# Patient Record
Sex: FEMALE | Race: BLACK OR AFRICAN AMERICAN | NOT HISPANIC OR LATINO | Employment: FULL TIME | URBAN - METROPOLITAN AREA
[De-identification: names, ages, dates, MRNs, and addresses within clinical notes are randomized per-mention and may not be internally consistent; named-entity substitution may affect disease eponyms.]

---

## 2017-01-06 ENCOUNTER — HOSPITAL ENCOUNTER (OUTPATIENT)
Dept: RADIOLOGY | Facility: OTHER | Age: 32
Discharge: HOME | End: 2017-01-06
Attending: INTERNAL MEDICINE
Payer: COMMERCIAL

## 2017-01-06 DIAGNOSIS — R10.33 ABDOMINAL PAIN, PERIUMBILIC: ICD-10-CM

## 2017-01-06 PROCEDURE — 74177 CT ABD & PELVIS W/CONTRAST: CPT | Mod: TC

## 2017-01-06 PROCEDURE — 74177 CT ABD & PELVIS W/CONTRAST: CPT | Mod: 26,,, | Performed by: RADIOLOGY

## 2017-01-06 PROCEDURE — 25500020 PHARM REV CODE 255: Performed by: INTERNAL MEDICINE

## 2017-01-06 RX ADMIN — IOHEXOL 25 ML: 350 INJECTION, SOLUTION INTRAVENOUS at 12:01

## 2017-01-06 RX ADMIN — IOHEXOL 75 ML: 350 INJECTION, SOLUTION INTRAVENOUS at 12:01

## 2017-03-08 ENCOUNTER — HOSPITAL ENCOUNTER (EMERGENCY)
Facility: OTHER | Age: 32
Discharge: HOME | End: 2017-03-09
Attending: EMERGENCY MEDICINE
Payer: COMMERCIAL

## 2017-03-08 DIAGNOSIS — S29.9XXA CHEST WALL INJURY, INITIAL ENCOUNTER: Primary | ICD-10-CM

## 2017-03-08 DIAGNOSIS — T14.90XA TRAUMA: ICD-10-CM
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
## 2017-03-08 LAB
B-HCG UR QL: NEGATIVE
CTP QC/QA: YES

## 2017-03-08 PROCEDURE — 99284 EMERGENCY DEPT VISIT MOD MDM: CPT | Mod: 25

## 2017-03-08 PROCEDURE — 81025 URINE PREGNANCY TEST: CPT | Performed by: EMERGENCY MEDICINE

## 2017-03-08 PROCEDURE — 93005 ELECTROCARDIOGRAM TRACING: CPT

## 2017-03-08 RX ORDER — IBUPROFEN 400 MG/1
800 TABLET ORAL
Status: COMPLETED | OUTPATIENT
Start: 2017-03-08 | End: 2017-03-09

## 2017-03-08 RX ORDER — METHOCARBAMOL 500 MG/1
1000 TABLET, FILM COATED ORAL
Status: COMPLETED | OUTPATIENT
Start: 2017-03-08 | End: 2017-03-09
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
## 2017-03-09 VITALS
HEART RATE: 90 BPM | RESPIRATION RATE: 16 BRPM | OXYGEN SATURATION: 100 % | BODY MASS INDEX: 25.71 KG/M2 | SYSTOLIC BLOOD PRESSURE: 114 MMHG | WEIGHT: 160 LBS | HEIGHT: 66 IN | DIASTOLIC BLOOD PRESSURE: 67 MMHG | TEMPERATURE: 98 F

## 2017-03-09 PROCEDURE — 25000003 PHARM REV CODE 250: Performed by: EMERGENCY MEDICINE

## 2017-03-09 PROCEDURE — 93010 ELECTROCARDIOGRAM REPORT: CPT | Mod: ,,, | Performed by: INTERNAL MEDICINE

## 2017-03-09 RX ORDER — METHOCARBAMOL 500 MG/1
1000 TABLET, FILM COATED ORAL 3 TIMES DAILY PRN
Qty: 30 TABLET | Refills: 0 | Status: SHIPPED | OUTPATIENT
Start: 2017-03-09 | End: 2017-03-14

## 2017-03-09 RX ORDER — IBUPROFEN 600 MG/1
600 TABLET ORAL EVERY 6 HOURS PRN
Qty: 20 TABLET | Refills: 0 | Status: SHIPPED | OUTPATIENT
Start: 2017-03-09 | End: 2017-06-14

## 2017-03-09 RX ADMIN — IBUPROFEN 800 MG: 400 TABLET, FILM COATED ORAL at 12:03

## 2017-03-09 RX ADMIN — METHOCARBAMOL 1000 MG: 500 TABLET ORAL at 12:03
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
## 2017-03-14 ENCOUNTER — OFFICE VISIT (OUTPATIENT)
Dept: OBSTETRICS AND GYNECOLOGY | Facility: CLINIC | Age: 32
End: 2017-03-14
Attending: OBSTETRICS & GYNECOLOGY
Payer: COMMERCIAL

## 2017-03-14 VITALS
SYSTOLIC BLOOD PRESSURE: 108 MMHG | BODY MASS INDEX: 28.86 KG/M2 | DIASTOLIC BLOOD PRESSURE: 70 MMHG | WEIGHT: 179.56 LBS | HEIGHT: 66 IN

## 2017-03-14 DIAGNOSIS — N89.8 VAGINAL IRRITATION: ICD-10-CM

## 2017-03-14 DIAGNOSIS — Z01.419 ENCOUNTER FOR GYNECOLOGICAL EXAMINATION: Primary | ICD-10-CM

## 2017-03-14 DIAGNOSIS — N89.8 VAGINAL DISCHARGE: ICD-10-CM

## 2017-03-14 DIAGNOSIS — Z11.51 SCREENING FOR HUMAN PAPILLOMAVIRUS: ICD-10-CM

## 2017-03-14 DIAGNOSIS — Z20.2 POSSIBLE EXPOSURE TO STD: ICD-10-CM

## 2017-03-14 LAB
CANDIDA RRNA VAG QL PROBE: NEGATIVE
G VAGINALIS RRNA GENITAL QL PROBE: NEGATIVE
T VAGINALIS RRNA GENITAL QL PROBE: NEGATIVE

## 2017-03-14 PROCEDURE — 88175 CYTOPATH C/V AUTO FLUID REDO: CPT

## 2017-03-14 PROCEDURE — 99999 PR PBB SHADOW E&M-EST. PATIENT-LVL III: CPT | Mod: PBBFAC,,, | Performed by: OBSTETRICS & GYNECOLOGY

## 2017-03-14 PROCEDURE — 87624 HPV HI-RISK TYP POOLED RSLT: CPT

## 2017-03-14 PROCEDURE — 99395 PREV VISIT EST AGE 18-39: CPT | Mod: S$GLB,,, | Performed by: OBSTETRICS & GYNECOLOGY

## 2017-03-14 PROCEDURE — 87480 CANDIDA DNA DIR PROBE: CPT

## 2017-03-14 PROCEDURE — 87591 N.GONORRHOEAE DNA AMP PROB: CPT

## 2017-03-14 RX ORDER — CLOTRIMAZOLE AND BETAMETHASONE DIPROPIONATE 10; .64 MG/G; MG/G
CREAM TOPICAL 2 TIMES DAILY
Qty: 15 G | Refills: 3 | Status: SHIPPED | OUTPATIENT
Start: 2017-03-14 | End: 2017-03-21

## 2017-03-14 RX ORDER — FLUCONAZOLE 150 MG/1
150 TABLET ORAL ONCE
Qty: 1 TABLET | Refills: 1 | Status: SHIPPED | OUTPATIENT
Start: 2017-03-14 | End: 2017-03-14

## 2017-03-14 NOTE — PROGRESS NOTES
"CC: Well woman exam    Syed Lyon is a 31 y.o. female  presents for a well woman exam.  She is established.  LMP: Patient's last menstrual period was 2017 (exact date)..   Patient without complaints.  Doing well.  Birth control has been bilateral salpingectomy.     Health Maintenance   Topic Date Due    TETANUS VACCINE  2004    Influenza Vaccine  2016    Pap Smear  2017    Lipid Panel  Completed         Past Medical History:   Diagnosis Date    HPV (human papilloma virus) infection     PID (pelvic inflammatory disease)        Past Surgical History:   Procedure Laterality Date     SECTION      fallopian tube removal Bilateral     HYSTEROSCOPY      2015    In Vitro      Dr Anuja Santana       OB History    Para Term  AB SAB TAB Ectopic Multiple Living   1 1  1     0 1      # Outcome Date GA Lbr Dustin/2nd Weight Sex Delivery Anes PTL Lv   1  16 35w5d  2.608 kg (5 lb 12 oz) F CS-LTranv Spinal N Y          Family History   Problem Relation Age of Onset    Heart disease Father     No Known Problems Daughter     Breast cancer Neg Hx     Colon cancer Neg Hx     Ovarian cancer Neg Hx     Cancer Neg Hx        Social History   Substance Use Topics    Smoking status: Never Smoker    Smokeless tobacco: Never Used    Alcohol use No      Comment: social       /70  Ht 5' 6" (1.676 m)  Wt 81.4 kg (179 lb 9 oz)  LMP 2017 (Exact Date)  Breastfeeding? No  BMI 28.98 kg/m2      ROS:  GENERAL: Denies weight gain or weight loss. Feeling well overall.   SKIN: Denies rash or lesions.   HEAD: Denies head injury or headache.   NODES: Denies enlarged lymph nodes.   CHEST: Denies chest pain or shortness of breath.   CARDIOVASCULAR: Denies palpitations or left sided chest pain.   ABDOMEN: No abdominal pain, constipation, diarrhea, nausea, vomiting or rectal bleeding.   URINARY: No frequency, dysuria, hematuria, or burning on " urination.  REPRODUCTIVE: See HPI.   BREASTS: The patient performs breast self-examination and denies pain, lumps, or nipple discharge.   HEMATOLOGIC: No easy bruisability or excessive bleeding.  MUSCULOSKELETAL: Denies joint pain or swelling.   NEUROLOGIC: Denies syncope or weakness.   PSYCHIATRIC: Denies depression, anxiety or mood swings.    Physical Exam:    APPEARANCE: Well nourished, well developed, in no acute distress.  AFFECT: WNL, alert and oriented x 3  SKIN: No acne or hirsutism  ABDOMEN: Soft.  No tenderness or masses.  No hepatosplenomegaly.  No hernias.  BREASTS: Symmetrical, no skin changes or visible lesions.  No palpable masses, nipple discharge bilaterally.  PELVIC: Normal external genitalia without lesions.  Vulva with irritation and white thin discharge.  Normal hair distribution.  Adequate perineal body, normal urethral meatus.  Vagina moist and well rugated without lesions .  Cervix pink, without lesions, discharge or tenderness.  No significant cystocele or rectocele.  Bimanual exam shows uterus to be normal size, regular, mobile and nontender.  Adnexa without masses or tenderness.    EXTREMITIES: No edema.    ASSESSMENT AND PLAN  1. Encounter for gynecological examination  Liquid-based pap smear, screening   2. Possible exposure to STD  C. trachomatis/N. gonorrhoeae by AMP DNA Cervicovaginal   3. Screening for human papillomavirus  HPV DNA probe, amplified   4. Vaginal irritation  Vaginosis Screen by DNA Probe    fluconazole (DIFLUCAN) 150 MG Tab    clotrimazole-betamethasone 1-0.05% (LOTRISONE) cream   5. Vaginal discharge  Vaginosis Screen by DNA Probe       Patient was counseled today on A.C.S. Pap guidelines and recommendations for yearly pelvic exams, mammograms and monthly self breast exams; to see her PCP for other health maintenance.     Return in about 1 year (around 3/14/2018).

## 2017-03-15 LAB
C TRACH DNA SPEC QL NAA+PROBE: NOT DETECTED
N GONORRHOEA DNA SPEC QL NAA+PROBE: NOT DETECTED

## 2017-03-17 LAB
HPV16 DNA SPEC QL NAA+PROBE: NEGATIVE
HPV16+18+H RISK 12 DNA CVX-IMP: POSITIVE
HPV18 DNA SPEC QL NAA+PROBE: NEGATIVE

## 2017-03-17 NOTE — PROGRESS NOTES
Please call and schedule colposcopy appointment in the next 3-4 weeks  Below is what I told her  Alexander,  Your Pap smear is still pending but your HPV has come back again positive.  I know you have been positive for HPV in the past.  Now than I am your new doctor, I would like to look at your cervix myself under a microscope and make sure that everything looks good.  Someone from the office should be calling you to schedule that appointment.  If you have not heard from them by mid week next week please call the office and schedule the colposcopy/microscope appointment.  Let me know if you have any questions or concerns.  We will discuss this in more detail at your next visit.  Have a great weekend!  Dr. Winters

## 2017-03-20 ENCOUNTER — TELEPHONE (OUTPATIENT)
Dept: OBSTETRICS AND GYNECOLOGY | Facility: CLINIC | Age: 32
End: 2017-03-20

## 2017-03-20 NOTE — TELEPHONE ENCOUNTER
Scheduled colpo appt for 4/18 at 11:15am.    ----- Message from Palomo Winters MD sent at 3/17/2017 12:47 PM CDT -----  Please call and schedule colposcopy appointment in the next 3-4 weeks  Below is what I told her  Alexander,  Your Pap smear is still pending but your HPV has come back again positive.  I know you have been positive for HPV in the past.  Now than I am your new doctor, I would like to look at your cervix myself under a microscope and make sure that everything looks good.  Someone from the office should be calling you to schedule that appointment.  If you have not heard from them by mid week next week please call the office and schedule the colposcopy/microscope appointment.  Let me know if you have any questions or concerns.  We will discuss this in more detail at your next visit.  Have a great weekend!  Dr. Winters

## 2017-04-18 ENCOUNTER — PROCEDURE VISIT (OUTPATIENT)
Dept: OBSTETRICS AND GYNECOLOGY | Facility: CLINIC | Age: 32
End: 2017-04-18
Attending: OBSTETRICS & GYNECOLOGY
Payer: COMMERCIAL

## 2017-04-18 VITALS
HEIGHT: 66 IN | SYSTOLIC BLOOD PRESSURE: 108 MMHG | DIASTOLIC BLOOD PRESSURE: 76 MMHG | WEIGHT: 178.81 LBS | BODY MASS INDEX: 28.74 KG/M2

## 2017-04-18 DIAGNOSIS — R87.810 CERVICAL HIGH RISK HUMAN PAPILLOMAVIRUS (HPV) DNA TEST POSITIVE: Primary | ICD-10-CM

## 2017-04-18 PROCEDURE — 88305 TISSUE EXAM BY PATHOLOGIST: CPT | Performed by: PATHOLOGY

## 2017-04-18 PROCEDURE — 88305 TISSUE EXAM BY PATHOLOGIST: CPT | Mod: 26,,, | Performed by: PATHOLOGY

## 2017-04-18 PROCEDURE — 57456 ENDOCERV CURETTAGE W/SCOPE: CPT | Mod: S$GLB,,, | Performed by: OBSTETRICS & GYNECOLOGY

## 2017-04-18 NOTE — MR AVS SNAPSHOT
"    [facility]  [address], [address]  [facility] [address]  Phone: [phone]  Fax: [phone]                  Syed Lyon   2017 11:15 AM   Procedure visit    Description:  Female : 1985   Provider:  Palomo Winters MD   [facility]  [facility]           Reason for Visit     Colposcopy                To Do List           Future Appointments        Provider Department Dept Phone    10/24/2017 10:30 AM Palomo Winters MD [facility] [phone]      Goals (5 Years of Data)     None      [facility]     [facility] Nurse Care Line -  Assistance  Unless otherwise directed by your provider, please contact [facility], our nurse care line that is available for  assistance.     Registered nurses in the [facility] Center provide: appointment scheduling, clinical advisement, health education, and other advisory services.  Call: [phone] (toll free)               Medications           Message regarding Medications     Verify the changes and/or additions to your medication regime listed below are the same as discussed with your clinician today.  If any of these changes or additions are incorrect, please notify your healthcare provider.             Verify that the below list of medications is an accurate representation of the medications you are currently taking.  If none reported, the list may be blank. If incorrect, please contact your healthcare provider. Carry this list with you in case of emergency.           Current Medications     chlorproMAZINE (THORAZINE) 25 MG tablet Take 1 tablet (25 mg total) by mouth 3 (three) times daily.    ibuprofen (ADVIL,MOTRIN) 600 MG tablet Take 1 tablet (600 mg total) by mouth every 6 (six) hours as needed for Pain.           Clinical Reference Information           Your Vitals Were     BP Height Weight Last Period BMI    108/76 5' 6" (1.676 m) 81.1 kg (178 lb 12.7 oz) 2017 28.86 kg/m2 "      Blood Pressure          Most Recent Value    BP  108/76      Allergies as of 4/18/2017     No Known Allergies      Immunizations Administered on Date of Encounter - 4/18/2017     None      Language Assistance Services     ATTENTION: Language assistance services are available, free of charge. Please call [phone].      ATENCIÓN: Si habla jenny, tiene a winkler disposición servicios gratuitos de asistencia lingüística. Llame al [phone].     CHÚ Ý: N?u b?n nói Ti?ng Vi?t, có các d?ch v? h? tr? ngôn ng? mi?n phí dành cho b?n. G?i s? [phone].         [facility] complies with applicable Federal civil rights laws and does not discriminate on the basis of race, color, national origin, age, disability, or sex.

## 2017-04-19 NOTE — PROCEDURES
Colposcopy  Date/Time: 4/18/2017 10:17 PM  Performed by: DADA ARDON  Authorized by: DADA ARDON     Consent Done?:  Yes (Written)    Colposcopy Site:  Cervix  Position:  Supine  Acrowhite Lesion: No    Atypical Vessels: No    Transformation Zone Adequate?: No    Biopsy?: No    ECC Performed?: Yes    LEEP Performed?: No     Patient tolerated the procedure well with no immediate complications.   Post-operative instructions were provided for the patient.   Patient was discharged and will follow up if any complications occur

## 2017-04-26 NOTE — PROGRESS NOTES
Aung Lynch,    I have reviewed your results and everything is normal.  Your Endocervical biopsy did not have any abnormal cells!! YAY  Let's plan to repeat pap in 6 months.  Hope you have a great day!    Dr. Winters

## 2017-06-14 ENCOUNTER — OFFICE VISIT (OUTPATIENT)
Dept: INTERNAL MEDICINE | Facility: CLINIC | Age: 32
End: 2017-06-14
Payer: COMMERCIAL

## 2017-06-14 ENCOUNTER — LAB VISIT (OUTPATIENT)
Dept: LAB | Facility: HOSPITAL | Age: 32
End: 2017-06-14
Attending: INTERNAL MEDICINE
Payer: COMMERCIAL

## 2017-06-14 DIAGNOSIS — R10.9 ABDOMINAL PAIN, UNSPECIFIED LOCATION: ICD-10-CM

## 2017-06-14 DIAGNOSIS — R10.9 ABDOMINAL PAIN, UNSPECIFIED LOCATION: Primary | ICD-10-CM

## 2017-06-14 LAB
BILIRUB UR QL STRIP: NEGATIVE
CLARITY UR REFRACT.AUTO: CLEAR
COLOR UR AUTO: YELLOW
GLUCOSE UR QL STRIP: NEGATIVE
HGB UR QL STRIP: NEGATIVE
KETONES UR QL STRIP: NEGATIVE
LEUKOCYTE ESTERASE UR QL STRIP: NEGATIVE
MICROSCOPIC COMMENT: NORMAL
NITRITE UR QL STRIP: NEGATIVE
PH UR STRIP: 6 [PH] (ref 5–8)
PROT UR QL STRIP: NEGATIVE
SP GR UR STRIP: 1.03 (ref 1–1.03)
URN SPEC COLLECT METH UR: NORMAL
UROBILINOGEN UR STRIP-ACNC: NEGATIVE EU/DL

## 2017-06-14 PROCEDURE — 87086 URINE CULTURE/COLONY COUNT: CPT

## 2017-06-14 PROCEDURE — 81001 URINALYSIS AUTO W/SCOPE: CPT

## 2017-06-14 PROCEDURE — 99999 PR PBB SHADOW E&M-EST. PATIENT-LVL IV: CPT | Mod: PBBFAC,,, | Performed by: INTERNAL MEDICINE

## 2017-06-14 PROCEDURE — 99213 OFFICE O/P EST LOW 20 MIN: CPT | Mod: S$GLB,,, | Performed by: INTERNAL MEDICINE

## 2017-06-15 LAB — BACTERIA UR CULT: NO GROWTH

## 2017-06-18 VITALS
HEIGHT: 66 IN | TEMPERATURE: 98 F | BODY MASS INDEX: 29.73 KG/M2 | OXYGEN SATURATION: 99 % | SYSTOLIC BLOOD PRESSURE: 122 MMHG | HEART RATE: 85 BPM | WEIGHT: 185 LBS | DIASTOLIC BLOOD PRESSURE: 80 MMHG

## 2017-06-19 NOTE — PROGRESS NOTES
Subjective:       Patient ID: Syed Lyon is a 31 y.o. female.    Chief Complaint: Abdominal Pain    HPI:  She complains of occasional episodes of abdominal pain.  Denies vomiting.  No diarrhea, no constipation.  No blood in stool.    Past medical history: Unremarkable    Medications none    NO KNOWN DRUG ALLERGIES      Review of Systems   Constitutional: Negative for chills, fatigue, fever and unexpected weight change.   Respiratory: Negative for chest tightness and shortness of breath.    Cardiovascular: Negative for chest pain and palpitations.   Gastrointestinal: Negative for abdominal pain and blood in stool.   Neurological: Negative for dizziness, syncope, numbness and headaches.       Objective:      Physical Exam   HENT:   Right Ear: External ear normal.   Left Ear: External ear normal.   Nose: Nose normal.   Mouth/Throat: Oropharynx is clear and moist.   Eyes: Pupils are equal, round, and reactive to light.   Neck: Normal range of motion.   Cardiovascular: Normal rate and regular rhythm.    No murmur heard.  Pulmonary/Chest: Breath sounds normal.   Abdominal: She exhibits no distension. There is no hepatosplenomegaly. There is no tenderness.   Lymphadenopathy:     She has no cervical adenopathy.     She has no axillary adenopathy.   Neurological: She has normal strength and normal reflexes. No cranial nerve deficit or sensory deficit.       Assessment:     assessment and plan: Abdominal pain: Check CMP, CBC, lipid panel, urine sent for urinalysis and culture.  Schedule gastroenterology appointment.  Advised her to call if symptom recurs      Plan:       As above

## 2017-06-21 ENCOUNTER — LAB VISIT (OUTPATIENT)
Dept: LAB | Facility: HOSPITAL | Age: 32
End: 2017-06-21
Attending: INTERNAL MEDICINE
Payer: COMMERCIAL

## 2017-06-21 DIAGNOSIS — R10.9 ABDOMINAL PAIN, UNSPECIFIED LOCATION: ICD-10-CM

## 2017-06-21 LAB
ALBUMIN SERPL BCP-MCNC: 3.5 G/DL
ALP SERPL-CCNC: 94 U/L
ALT SERPL W/O P-5'-P-CCNC: 8 U/L
ANION GAP SERPL CALC-SCNC: 9 MMOL/L
ANISOCYTOSIS BLD QL SMEAR: SLIGHT
AST SERPL-CCNC: 13 U/L
BASOPHILS # BLD AUTO: 0.02 K/UL
BASOPHILS NFR BLD: 0.5 %
BILIRUB SERPL-MCNC: 0.7 MG/DL
BUN SERPL-MCNC: 17 MG/DL
CALCIUM SERPL-MCNC: 9 MG/DL
CHLORIDE SERPL-SCNC: 107 MMOL/L
CHOLEST/HDLC SERPL: 2.8 {RATIO}
CO2 SERPL-SCNC: 23 MMOL/L
CREAT SERPL-MCNC: 0.7 MG/DL
DIFFERENTIAL METHOD: ABNORMAL
EOSINOPHIL # BLD AUTO: 0.3 K/UL
EOSINOPHIL NFR BLD: 6.1 %
ERYTHROCYTE [DISTWIDTH] IN BLOOD BY AUTOMATED COUNT: 17.7 %
EST. GFR  (AFRICAN AMERICAN): >60 ML/MIN/1.73 M^2
EST. GFR  (NON AFRICAN AMERICAN): >60 ML/MIN/1.73 M^2
GLUCOSE SERPL-MCNC: 87 MG/DL
HCT VFR BLD AUTO: 30.5 %
HDL/CHOLESTEROL RATIO: 36.1 %
HDLC SERPL-MCNC: 158 MG/DL
HDLC SERPL-MCNC: 57 MG/DL
HGB BLD-MCNC: 9.2 G/DL
HYPOCHROMIA BLD QL SMEAR: ABNORMAL
LDLC SERPL CALC-MCNC: 90.8 MG/DL
LYMPHOCYTES # BLD AUTO: 1.6 K/UL
LYMPHOCYTES NFR BLD: 35.9 %
MCH RBC QN AUTO: 18.3 PG
MCHC RBC AUTO-ENTMCNC: 30.2 %
MCV RBC AUTO: 61 FL
MONOCYTES # BLD AUTO: 0.4 K/UL
MONOCYTES NFR BLD: 8.8 %
NEUTROPHILS # BLD AUTO: 2.2 K/UL
NEUTROPHILS NFR BLD: 48.7 %
NONHDLC SERPL-MCNC: 101 MG/DL
PLATELET # BLD AUTO: 246 K/UL
PLATELET BLD QL SMEAR: ABNORMAL
PMV BLD AUTO: ABNORMAL FL
POIKILOCYTOSIS BLD QL SMEAR: SLIGHT
POTASSIUM SERPL-SCNC: 3.7 MMOL/L
PROT SERPL-MCNC: 7.4 G/DL
RBC # BLD AUTO: 5.04 M/UL
SCHISTOCYTES BLD QL SMEAR: ABNORMAL
SODIUM SERPL-SCNC: 139 MMOL/L
TRIGL SERPL-MCNC: 51 MG/DL
WBC # BLD AUTO: 4.43 K/UL

## 2017-06-21 PROCEDURE — 36415 COLL VENOUS BLD VENIPUNCTURE: CPT

## 2017-06-21 PROCEDURE — 85025 COMPLETE CBC W/AUTO DIFF WBC: CPT

## 2017-06-21 PROCEDURE — 80053 COMPREHEN METABOLIC PANEL: CPT

## 2017-06-21 PROCEDURE — 80061 LIPID PANEL: CPT

## 2017-06-23 ENCOUNTER — TELEPHONE (OUTPATIENT)
Dept: INTERNAL MEDICINE | Facility: CLINIC | Age: 32
End: 2017-06-23

## 2017-06-23 DIAGNOSIS — D64.9 ANEMIA, UNSPECIFIED TYPE: Primary | ICD-10-CM

## 2017-06-23 RX ORDER — FERROUS SULFATE 325(65) MG
325 TABLET ORAL 2 TIMES DAILY
Qty: 60 TABLET | Refills: 3 | Status: SHIPPED | OUTPATIENT
Start: 2017-06-23 | End: 2017-11-28

## 2017-07-03 ENCOUNTER — TELEPHONE (OUTPATIENT)
Dept: ENDOSCOPY | Facility: HOSPITAL | Age: 32
End: 2017-07-03

## 2017-07-03 ENCOUNTER — OFFICE VISIT (OUTPATIENT)
Dept: GASTROENTEROLOGY | Facility: CLINIC | Age: 32
End: 2017-07-03
Payer: COMMERCIAL

## 2017-07-03 VITALS
WEIGHT: 186.06 LBS | HEART RATE: 80 BPM | BODY MASS INDEX: 29.9 KG/M2 | HEIGHT: 66 IN | SYSTOLIC BLOOD PRESSURE: 106 MMHG | DIASTOLIC BLOOD PRESSURE: 65 MMHG

## 2017-07-03 DIAGNOSIS — Z12.11 SPECIAL SCREENING FOR MALIGNANT NEOPLASMS, COLON: Primary | ICD-10-CM

## 2017-07-03 DIAGNOSIS — G89.29 CHRONIC BILATERAL LOWER ABDOMINAL PAIN: Primary | ICD-10-CM

## 2017-07-03 DIAGNOSIS — D50.9 IRON DEFICIENCY ANEMIA, UNSPECIFIED IRON DEFICIENCY ANEMIA TYPE: ICD-10-CM

## 2017-07-03 DIAGNOSIS — R10.31 CHRONIC BILATERAL LOWER ABDOMINAL PAIN: Primary | ICD-10-CM

## 2017-07-03 DIAGNOSIS — R10.32 CHRONIC BILATERAL LOWER ABDOMINAL PAIN: Primary | ICD-10-CM

## 2017-07-03 PROCEDURE — 99204 OFFICE O/P NEW MOD 45 MIN: CPT | Mod: S$GLB,,, | Performed by: NURSE PRACTITIONER

## 2017-07-03 PROCEDURE — 99999 PR PBB SHADOW E&M-EST. PATIENT-LVL III: CPT | Mod: PBBFAC,,, | Performed by: NURSE PRACTITIONER

## 2017-07-03 RX ORDER — POLYETHYLENE GLYCOL 3350, SODIUM SULFATE ANHYDROUS, SODIUM BICARBONATE, SODIUM CHLORIDE, POTASSIUM CHLORIDE 236; 22.74; 6.74; 5.86; 2.97 G/4L; G/4L; G/4L; G/4L; G/4L
4 POWDER, FOR SOLUTION ORAL ONCE
Qty: 4000 ML | Refills: 0 | Status: SHIPPED | OUTPATIENT
Start: 2017-07-03 | End: 2017-07-03

## 2017-07-03 NOTE — PROGRESS NOTES
[facility] Note    Reason for Visit:  The primary encounter diagnosis was Chronic bilateral lower abdominal pain. A diagnosis of Iron deficiency anemia, unspecified iron deficiency anemia type was also pertinent to this visit.    PCP:   Mariah Livingston   [address] / [place] [address]    Referring MD:  Mariah Livingston Md  [address] [place] [place] [address]    HPI:  This is a 31 y.o. female here for evaluation of abdominal pain. Ms. Lyon is new to the clinic.     Abdominal pain-   ONSET: since after childbirth 9/30/17  LOCATION: lower abdomen   DURATION: intermittent, a couple times per month at most  CHARACTER: cramping   ASSOCIATED/ALLEVIATING/AGGRAVAITING: No n/v/fever. Occurs if does not eat or if hungry. Relief after eating.   RADIATION: denies radiation   TEMPORAL: no association with meals.   SEVERITY: 6-7/10    Having a normal formed stool daily or every other day. Hx of anemia since 2006. Pt reports heavy menstrual cycles. PCP started on Iron supplements daily. Hx of endometriosis. Denies SOB, fatigue, CP, PICA cravings, and lightheadedness. Denies hematochezia, hematemesis, melena, BRBPR, black/tarry stools, and coffee ground emesis.     Denies reflux, dysphagia, odynophagia. NSAID usage- Will take Ibuprofen a couple times per month.     ROS:  Constitutional: No fevers, no chills, No unintentional weight loss, no fatigue   ENT: No allergies  CV: No chest pain, no palpitations, no perif. edema  Pulm: No cough, No shortness of breath, no wheezes, no sputum  Ophtho: No vision changes  GI: see HPI; also no nausea, no vomiting, no change in appetite  Derm: No rash  Heme: No lymphadenopathy, No bruising  MSK: No arthritis, no muscle pain, no muscle weakness  : No dysuria, No hematuria  Endo: No hot or cold intolerance  Neuro: No syncope, No seizure     Medical History:  has a past medical history of HPV (human papilloma virus) infection and PID (pelvic  "inflammatory disease).    Surgical History:  has a past surgical history that includes Hysteroscopy; In Vitro (2015); fallopian tube removal (Bilateral); and  section.    Family History: family history includes Heart disease in her father; No Known Problems in her daughter..     Social History:  reports that she has never smoked. She has never used smokeless tobacco. She reports that she does not drink alcohol or use drugs.    Review of patient's allergies indicates:  No Known Allergies    Current Outpatient Prescriptions   Medication Sig    ferrous sulfate 325 mg (65 mg iron) Tab tablet Take 1 tablet (325 mg total) by mouth 2 (two) times daily.    polyethylene glycol (GOLYTELY,NULYTELY) 236-22.74-6.74 -5.86 gram suspension Take 4,000 mLs (4 L total) by mouth once.     No current facility-administered medications for this visit.      Objective Findings:    Vital Signs:  /65   Pulse 80   Ht 5' 6" (1.676 m)   Wt 84.4 kg (186 lb 1.1 oz)   BMI 30.03 kg/m²   Body mass index is 30.03 kg/m².    Physical Exam:  General Appearance: Well appearing in no acute distress  Head: Normocephalic, without obvious abnormality  Eyes: No scleral icterus, EOMI  ENT: Neck supple, Lips, mucosa, and tongue normal; teeth and gums normal  Lungs: CTA bilaterally in anterior and posterior fields, no wheezes, no crackles.  Heart: Regular rate and rhythm, no murmurs heard  Abdomen: Soft, non tender, non distended with positive bowel sounds in all four quadrants.  Extremities: No clubbing, cyanosis or edema  Skin: No rash to exposed areas  Neurologic: AAOx4    Labs:  Lab Results   Component Value Date    WBC 4.43 2017    HGB 9.2 (L) 2017    HCT 30.5 (L) 2017     2017    CHOL 158 2017    TRIG 51 2017    HDL 57 2017    ALT 8 (L) 2017    AST 13 2017     2017    K 3.7 2017     2017    CREATININE 0.7 2017    BUN 17 2017    CO2 23 " 06/21/2017    TSH 0.843 12/29/2016    INR 1.1 10/01/2016     Imaging:  Abdominal CT- 1/6/17- 4.5 cm complex cystic lesion in the right adnexal region may relate to hemorrhagic cyst and could be further evaluated with dedicated pelvic ultrasound. No evidence of bowel obstruction or inflammation.     Endoscopy:    EGD- none  Colonoscopy- none    Assessment:  1. Chronic bilateral lower abdominal pain    2. Iron deficiency anemia, unspecified iron deficiency anemia type      Recommendations:  1. Schedule EGD and Colonoscopy due to chronic abdominal pain. Ordered labs today- H. Pylori and CMP.   2. Ordered labs- CBC and Iron studies. Continue taking Iron supplement daily.     Follow up after EGD and Colonoscopy.     Order summary:  Orders Placed This Encounter    H. PYLORI ANTIBODY, IGG    IRON AND TIBC    CBC auto differential    Comprehensive metabolic panel    Case request GI: COLONOSCOPY, ESOPHAGOGASTRODUODENOSCOPY (EGD)     Thank you so much for allowing me to participate in the care of RASHAD Jamil, FNP-C

## 2017-07-03 NOTE — LETTER
July 3, 2017      Mariah Livingston MD  [address]  [place] [address]           [facility]  [address]  [place] [address]  Phone: [phone]  Fax: [phone]          Patient: Syed Lyon   MR Number: [id number]   YOB: 1985   Date of Visit: 7/3/2017       Dear Dr. Mariah Livingston:    Thank you for referring Syed Lyon to me for evaluation. Attached you will find relevant portions of my assessment and plan of care.    If you have questions, please do not hesitate to call me. I look forward to following Syed Lyon along with you.    Sincerely,    Pura Ibrahim, TERESITA    Enclosure  CC:  No Recipients    If you would like to receive this communication electronically, please contact [email or web address] or [phone] to request more information on [employer] access.    For providers and/or their staff who would like to refer a patient to [facility], please contact us through our one-stop-shop provider referral line, [facility], at [phone].    If you feel you have received this communication in error or would no longer like to receive these types of communications, please e-mail [email or web address]

## 2017-07-07 ENCOUNTER — TELEPHONE (OUTPATIENT)
Dept: GASTROENTEROLOGY | Facility: CLINIC | Age: 32
End: 2017-07-07

## 2017-07-07 DIAGNOSIS — A04.8 H. PYLORI INFECTION: Primary | ICD-10-CM

## 2017-07-07 RX ORDER — OMEPRAZOLE 40 MG/1
40 CAPSULE, DELAYED RELEASE ORAL 2 TIMES DAILY
Qty: 28 CAPSULE | Refills: 0 | Status: SHIPPED | OUTPATIENT
Start: 2017-07-07 | End: 2017-09-05

## 2017-07-07 RX ORDER — CLARITHROMYCIN 500 MG/1
500 TABLET, FILM COATED ORAL 2 TIMES DAILY
Qty: 28 TABLET | Refills: 0 | Status: SHIPPED | OUTPATIENT
Start: 2017-07-07 | End: 2017-07-21

## 2017-07-07 RX ORDER — AMOXICILLIN 500 MG/1
1000 CAPSULE ORAL 2 TIMES DAILY
Qty: 56 CAPSULE | Refills: 0 | Status: SHIPPED | OUTPATIENT
Start: 2017-07-07 | End: 2017-07-21

## 2017-07-07 NOTE — TELEPHONE ENCOUNTER
Called pt did not answer left detailed message on voicemail regarding positive for H. Pylori. Explained bacteria and treatment. Sending triple therapy to pharmacy. Explained to call back Monday. Clinic closed over weekend.     Pura Ibrahim NP

## 2017-07-27 ENCOUNTER — TELEPHONE (OUTPATIENT)
Dept: OBSTETRICS AND GYNECOLOGY | Facility: CLINIC | Age: 32
End: 2017-07-27

## 2017-07-27 RX ORDER — FLUCONAZOLE 150 MG/1
150 TABLET ORAL DAILY
Qty: 2 TABLET | Refills: 0 | Status: CANCELLED | OUTPATIENT
Start: 2017-07-27 | End: 2017-07-29

## 2017-07-27 RX ORDER — FLUCONAZOLE 150 MG/1
TABLET ORAL
Qty: 3 TABLET | Refills: 0 | Status: SHIPPED | OUTPATIENT
Start: 2017-07-27 | End: 2017-11-28

## 2017-07-27 NOTE — TELEPHONE ENCOUNTER
Pt thinks she has a yeast infection after using the wrong soap.  C/o irritation.  She is requesting a rx.  Advised if not better after taking medication she should be seen.     Diflucan pended

## 2017-07-27 NOTE — TELEPHONE ENCOUNTER
Bone pt - pt is having vaginal irritation, she said she used the wrong soap which she thinks caused a yeast infection. She would like to see if she can get something called in for her.  rx - [phone]

## 2017-08-07 ENCOUNTER — ANESTHESIA EVENT (OUTPATIENT)
Dept: ENDOSCOPY | Facility: HOSPITAL | Age: 32
End: 2017-08-07
Payer: COMMERCIAL

## 2017-08-07 ENCOUNTER — ANESTHESIA (OUTPATIENT)
Dept: ENDOSCOPY | Facility: HOSPITAL | Age: 32
End: 2017-08-07
Payer: COMMERCIAL

## 2017-08-07 ENCOUNTER — TELEPHONE (OUTPATIENT)
Dept: GASTROENTEROLOGY | Facility: CLINIC | Age: 32
End: 2017-08-07

## 2017-08-07 ENCOUNTER — HOSPITAL ENCOUNTER (OUTPATIENT)
Facility: HOSPITAL | Age: 32
Discharge: HOME | End: 2017-08-07
Attending: INTERNAL MEDICINE | Admitting: INTERNAL MEDICINE
Payer: COMMERCIAL

## 2017-08-07 ENCOUNTER — SURGERY (OUTPATIENT)
Age: 32
End: 2017-08-07

## 2017-08-07 VITALS
HEART RATE: 60 BPM | RESPIRATION RATE: 14 BRPM | BODY MASS INDEX: 29.89 KG/M2 | SYSTOLIC BLOOD PRESSURE: 114 MMHG | DIASTOLIC BLOOD PRESSURE: 74 MMHG | TEMPERATURE: 98 F | HEIGHT: 66 IN | OXYGEN SATURATION: 100 % | WEIGHT: 186 LBS

## 2017-08-07 VITALS — RESPIRATION RATE: 23 BRPM

## 2017-08-07 DIAGNOSIS — R10.9 ABDOMINAL PAIN: ICD-10-CM

## 2017-08-07 DIAGNOSIS — R10.30 LOWER ABDOMINAL PAIN: Primary | ICD-10-CM

## 2017-08-07 LAB
B-HCG UR QL: NEGATIVE
CTP QC/QA: YES

## 2017-08-07 PROCEDURE — 43239 EGD BIOPSY SINGLE/MULTIPLE: CPT | Performed by: INTERNAL MEDICINE

## 2017-08-07 PROCEDURE — 63600175 PHARM REV CODE 636 W HCPCS: Performed by: NURSE ANESTHETIST, CERTIFIED REGISTERED

## 2017-08-07 PROCEDURE — 45380 COLONOSCOPY AND BIOPSY: CPT | Mod: ,,, | Performed by: INTERNAL MEDICINE

## 2017-08-07 PROCEDURE — 25000003 PHARM REV CODE 250: Performed by: INTERNAL MEDICINE

## 2017-08-07 PROCEDURE — 88305 TISSUE EXAM BY PATHOLOGIST: CPT | Mod: 26,,, | Performed by: PATHOLOGY

## 2017-08-07 PROCEDURE — 37000008 HC ANESTHESIA 1ST 15 MINUTES: Performed by: INTERNAL MEDICINE

## 2017-08-07 PROCEDURE — D9220A PRA ANESTHESIA: Mod: CRNA,,, | Performed by: NURSE ANESTHETIST, CERTIFIED REGISTERED

## 2017-08-07 PROCEDURE — 37000009 HC ANESTHESIA EA ADD 15 MINS: Performed by: INTERNAL MEDICINE

## 2017-08-07 PROCEDURE — 43239 EGD BIOPSY SINGLE/MULTIPLE: CPT | Mod: 51,,, | Performed by: INTERNAL MEDICINE

## 2017-08-07 PROCEDURE — 81025 URINE PREGNANCY TEST: CPT | Performed by: INTERNAL MEDICINE

## 2017-08-07 PROCEDURE — 88342 IMHCHEM/IMCYTCHM 1ST ANTB: CPT | Mod: 26,,, | Performed by: PATHOLOGY

## 2017-08-07 PROCEDURE — D9220A PRA ANESTHESIA: Mod: ANES,,, | Performed by: ANESTHESIOLOGY

## 2017-08-07 PROCEDURE — 27201012 HC FORCEPS, HOT/COLD, DISP: Performed by: INTERNAL MEDICINE

## 2017-08-07 PROCEDURE — 45380 COLONOSCOPY AND BIOPSY: CPT | Performed by: INTERNAL MEDICINE

## 2017-08-07 PROCEDURE — 88305 TISSUE EXAM BY PATHOLOGIST: CPT | Performed by: PATHOLOGY

## 2017-08-07 RX ORDER — LIDOCAINE HCL/PF 100 MG/5ML
SYRINGE (ML) INTRAVENOUS
Status: DISCONTINUED | OUTPATIENT
Start: 2017-08-07 | End: 2017-08-07

## 2017-08-07 RX ORDER — SODIUM CHLORIDE 9 MG/ML
INJECTION, SOLUTION INTRAVENOUS CONTINUOUS
Status: DISCONTINUED | OUTPATIENT
Start: 2017-08-07 | End: 2017-08-07 | Stop reason: HOSPADM

## 2017-08-07 RX ORDER — PROPOFOL 10 MG/ML
VIAL (ML) INTRAVENOUS
Status: DISCONTINUED | OUTPATIENT
Start: 2017-08-07 | End: 2017-08-07

## 2017-08-07 RX ADMIN — PROPOFOL 50 MG: 10 INJECTION, EMULSION INTRAVENOUS at 10:08

## 2017-08-07 RX ADMIN — SODIUM CHLORIDE: 0.9 INJECTION, SOLUTION INTRAVENOUS at 10:08

## 2017-08-07 RX ADMIN — LIDOCAINE HYDROCHLORIDE 100 MG: 20 INJECTION, SOLUTION INTRAVENOUS at 10:08

## 2017-08-07 RX ADMIN — PROPOFOL 100 MG: 10 INJECTION, EMULSION INTRAVENOUS at 10:08

## 2017-08-07 RX ADMIN — SODIUM CHLORIDE: 0.9 INJECTION, SOLUTION INTRAVENOUS at 09:08

## 2017-08-07 NOTE — ANESTHESIA PREPROCEDURE EVALUATION
08/07/2017  Syed Lyon is a 31 y.o., female.    Anesthesia Evaluation    I have reviewed the Patient Summary Reports.    I have reviewed the Nursing Notes.   I have reviewed the Medications.     Review of Systems  Anesthesia Hx:  No problems with previous Anesthesia  Denies Family Hx of Anesthesia complications.   Denies Personal Hx of Anesthesia complications.   Hematology/Oncology:         -- Anemia:   Cardiovascular:   Exercise tolerance: good  Functional Capacity good / => 4 METS    Hepatic/GI:   Chronic abdominal pain       Physical Exam   Airway/Jaw/Neck:  Airway Findings: Mouth Opening: Normal Tongue: Normal  General Airway Assessment: Adult, Good  TM Distance: Normal, at least 6 cm       Chest/Lungs:  Chest/Lungs Findings: Normal Respiratory Rate         Mental Status:  Mental Status Findings:  Cooperative, Alert and Oriented         Anesthesia Plan  Type of Anesthesia, risks & benefits discussed:  Anesthesia Type:  general  Patient's Preference: General  Intra-op Monitoring Plan: standard ASA monitors  Intra-op Monitoring Plan Comments:   Post Op Pain Control Plan:   Post Op Pain Control Plan Comments: Per primary service  Induction:   IV  Beta Blocker:  Patient is not currently on a Beta-Blocker (No further documentation required).       Informed Consent: Patient understands risks and agrees with Anesthesia plan.  Questions answered. Anesthesia consent signed with patient.  ASA Score: 2     Day of Surgery Review of History & Physical:    H&P update referred to the surgeon.         Ready For Surgery From Anesthesia Perspective.

## 2017-08-07 NOTE — ANESTHESIA POSTPROCEDURE EVALUATION
"Anesthesia Post Evaluation    Patient: Syed Lyon    Procedure(s) Performed: Procedure(s) (LRB):  ESOPHAGOGASTRODUODENOSCOPY (EGD) (N/A)  COLONOSCOPY (N/A)    Final Anesthesia Type: general  Patient location during evaluation: GI PACU  Patient participation: Yes- Able to Participate  Level of consciousness: awake and alert and oriented  Post-procedure vital signs: reviewed and stable  Pain management: adequate  Airway patency: patent  PONV status at discharge: No PONV  Anesthetic complications: no      Cardiovascular status: hemodynamically stable  Respiratory status: unassisted, spontaneous ventilation and room air  Hydration status: euvolemic  Follow-up not needed.        Visit Vitals  /74 (BP Location: Left arm, Patient Position: Sitting, BP Method: Automatic)   Pulse 60   Temp 36.8 °C (98.2 °F) (Oral)   Resp 14   Ht 5' 6" (1.676 m)   Wt 84.4 kg (186 lb)   SpO2 100%   Breastfeeding? No   BMI 30.02 kg/m²       Pain/Keira Score: Pain Assessment Performed: Yes (8/7/2017 11:23 AM)  Presence of Pain: denies (8/7/2017 11:23 AM)  Pain Rating Prior to Med Admin: 0 (8/7/2017  9:55 AM)  Keira Score: 10 (8/7/2017 11:23 AM)      "

## 2017-08-07 NOTE — H&P
Short Stay Endoscopy History and Physical    PCP - Mariah Livingston MD    Procedure - EGD/Colonoscopy  Sedation: GA  ASA - per anesthesia  Mallampati - per anesthesia  History of Anesthesia problems - no  Family history Anesthesia problems -  no     HPI:  This is a 31 y.o. female here for evaluation of : Chronic abdominal pain and iron def anemia    Reflux - no  Dysphagia - no  Abdominal pain - yes  Diarrhea - no    ROS:  Constitutional: No fevers, chills, No weight loss  ENT: No allergies  CV: No chest pain  Pulm: No cough, No shortness of breath  Ophtho: No vision changes  GI: see HPI    Medical History:  has a past medical history of HPV (human papilloma virus) infection and PID (pelvic inflammatory disease).    Surgical History:  has a past surgical history that includes Hysteroscopy; In Vitro (); fallopian tube removal (Bilateral); and  section.    Family History: family history includes Heart disease in her father; No Known Problems in her daughter.. Otherwise no colon cancer, inflammatory bowel disease, or GI malignancies.    Social History:  reports that she has never smoked. She has never used smokeless tobacco. She reports that she does not drink alcohol or use drugs.    Review of patient's allergies indicates:  No Known Allergies    Medications:   Prescriptions Prior to Admission   Medication Sig Dispense Refill Last Dose    ferrous sulfate 325 mg (65 mg iron) Tab tablet Take 1 tablet (325 mg total) by mouth 2 (two) times daily. 60 tablet 3 Past Week at Unknown time    fluconazole (DIFLUCAN) 150 MG Tab Take one pill my mouth today (Day 1), one on Day 4, and one on Day 7. 3 tablet 0 Past Week at Unknown time    omeprazole (PRILOSEC) 40 MG capsule Take 1 capsule (40 mg total) by mouth 2 (two) times daily. 28 capsule 0        Objective Findings:    Vital Signs: Per nursing notes.    Physical Exam:  General Appearance: Well appearing in no acute distress  Head:   Normocephalic, without obvious  abnormality  Eyes:    No scleral icterus  Airway: Open  Neck: No restriction in mobility  Lungs: CTA bilaterally in anterior and posterior fields, no wheezes, no crackles.  Heart:  Regular rate and rhythm, S1, S2 normal, no murmurs heard  Abdomen: Soft, non tender, non distended      Labs:  Lab Results   Component Value Date    WBC 4.34 07/03/2017    HGB 9.2 (L) 07/03/2017    HCT 31.1 (L) 07/03/2017     07/03/2017    CHOL 158 06/21/2017    TRIG 51 06/21/2017    HDL 57 06/21/2017    ALT 11 07/03/2017    AST 12 07/03/2017     07/03/2017    K 4.7 07/03/2017     07/03/2017    CREATININE 0.7 07/03/2017    BUN 21 (H) 07/03/2017    CO2 25 07/03/2017    TSH 0.843 12/29/2016    INR 1.1 10/01/2016         I have explained the risks and benefits of endoscopy procedures to the patient including but not limited to bleeding, perforation, infection, and death.    Thank you so much for allowing me to participate in the care of Syed Zaidi MD

## 2017-08-07 NOTE — PATIENT INSTRUCTIONS
Discharge Summary/Instructions after an Endoscopic Procedure  Patient Name: Syed Lyon  Patient MRN: [id number]  Patient YOB: 1985 Monday, August 07, 2017  Jose Zaidi MD  RESTRICTIONS ON ACTIVITY:  - DO NOT drive a car, operate machinery, make legal/financial decisions, or   drink alcohol until the day after the procedure.    - The following day: return to full activity including work, except no heavy   lifting, straining or running for 3 days if polyps were removed.  - Diet: Eat and drink normally unless instructed otherwise.  TREATMENT FOR COMMON SIDE EFFECTS:  - Mild abdominal pain, bloating or excessive gas: rest, eat lightly and use   a heating pad.  - Sore Throat - treat with throat lozenges. Gargle with warm salt water.  SYMPTOMS TO WATCH FOR AND REPORT TO YOUR PHYSICIAN:  1. Severe abdominal pain or bloating.  2. Pain in chest.  3. Chills or fever occurring within 24 hours after a procedure.  4. A large amount of rectal bleeding, which would show as bright red,   maroon, or black stools. (A small amount of blood from the rectum is not   serious, especially if hemorrhoids are present.)  5. Because air was used during the procedure, expelling large amounts of air   from your rectum or belching is normal.  6. If a bowel prep was taken, you may not have a bowel movement for 1-3   days.  This is normal.  7. Go directly to the emergency room if you notice any of the following:   Chills and/or fever over 101 F   Persistent vomiting   Severe abdominal pain, other than gas cramps   Severe chest pain   Black, tarry stools   Any bleeding - exceeding one tablespoon  Your doctor recommends these additional instructions:  If any biopsies were performed, my office will call you in 5 to 6 business   days with any results.  You have a contact number available for emergencies.  The signs and symptoms   of potential delayed complications were discussed with you.  You may return   to normal activities  tomorrow.  Written discharge instructions were   provided to you.   You are being discharged to home.   Resume your previous diet.   Continue your present medications.   We are waiting for your pathology results.  For questions, problems or results please call your physician - Jose Zaidi MD at Work:  [phone].  [facility], EMERGENCY ROOM PHONE NUMBER: [phone]  IF A COMPLICATION OR EMERGENCY SITUATION ARISES AND YOU ARE UNABLE TO REACH   YOUR PHYSICIAN - GO TO THE EMERGENCY ROOM.  Joes Zaidi MD  8/7/2017 10:29:35 AM  This report has been verified and signed electronically.

## 2017-08-07 NOTE — PATIENT INSTRUCTIONS
Discharge Summary/Instructions after an Endoscopic Procedure  Patient Name: Syed Lyon  Patient MRN: [id number]  Patient YOB: 1985 Monday, August 07, 2017  Jose Zaidi MD  RESTRICTIONS ON ACTIVITY:  - DO NOT drive a car, operate machinery, make legal/financial decisions, or   drink alcohol until the day after the procedure.    - The following day: return to full activity including work, except no heavy   lifting, straining or running for 3 days if polyps were removed.  - Diet: Eat and drink normally unless instructed otherwise.  TREATMENT FOR COMMON SIDE EFFECTS:  - Mild abdominal pain, bloating or excessive gas: rest, eat lightly and use   a heating pad.  - Sore Throat - treat with throat lozenges. Gargle with warm salt water.  SYMPTOMS TO WATCH FOR AND REPORT TO YOUR PHYSICIAN:  1. Severe abdominal pain or bloating.  2. Pain in chest.  3. Chills or fever occurring within 24 hours after a procedure.  4. A large amount of rectal bleeding, which would show as bright red,   maroon, or black stools. (A small amount of blood from the rectum is not   serious, especially if hemorrhoids are present.)  5. Because air was used during the procedure, expelling large amounts of air   from your rectum or belching is normal.  6. If a bowel prep was taken, you may not have a bowel movement for 1-3   days.  This is normal.  7. Go directly to the emergency room if you notice any of the following:   Chills and/or fever over 101 F   Persistent vomiting   Severe abdominal pain, other than gas cramps   Severe chest pain   Black, tarry stools   Any bleeding - exceeding one tablespoon  Your doctor recommends these additional instructions:  If any biopsies were performed, my office will call you in 5 to 6 business   days with any results.  You have a contact number available for emergencies.  The signs and symptoms   of potential delayed complications were discussed with you.  You may return   to normal activities  tomorrow.  Written discharge instructions were   provided to you.   You are being discharged to home.   Resume your previous diet.   Continue your present medications.   We are waiting for your pathology results.   Your physician has recommended a repeat colonoscopy at age 46 years for   screening purposes.  For questions, problems or results please call your physician - Jose Zaidi MD at Work:  [phone].  [facility], EMERGENCY ROOM PHONE NUMBER: [phone]  IF A COMPLICATION OR EMERGENCY SITUATION ARISES AND YOU ARE UNABLE TO REACH   YOUR PHYSICIAN - GO TO THE EMERGENCY ROOM.  Jose Zaidi MD  8/7/2017 10:50:57 AM  This report has been verified and signed electronically.

## 2017-08-10 ENCOUNTER — TELEPHONE (OUTPATIENT)
Dept: GASTROENTEROLOGY | Facility: CLINIC | Age: 32
End: 2017-08-10

## 2017-08-10 NOTE — PROGRESS NOTES
Please notify patient, the stomach biopsies did not reveal any H.pylori and the colon biopsies were normal. Follow up with Pura.

## 2017-08-14 ENCOUNTER — TELEPHONE (OUTPATIENT)
Dept: ENDOSCOPY | Facility: HOSPITAL | Age: 32
End: 2017-08-14

## 2017-09-05 ENCOUNTER — OFFICE VISIT (OUTPATIENT)
Dept: GASTROENTEROLOGY | Facility: CLINIC | Age: 32
End: 2017-09-05
Payer: COMMERCIAL

## 2017-09-05 VITALS
DIASTOLIC BLOOD PRESSURE: 68 MMHG | WEIGHT: 192.44 LBS | HEART RATE: 87 BPM | HEIGHT: 66 IN | BODY MASS INDEX: 30.93 KG/M2 | SYSTOLIC BLOOD PRESSURE: 109 MMHG

## 2017-09-05 DIAGNOSIS — G89.29 CHRONIC BILATERAL LOWER ABDOMINAL PAIN: ICD-10-CM

## 2017-09-05 DIAGNOSIS — R10.32 CHRONIC BILATERAL LOWER ABDOMINAL PAIN: ICD-10-CM

## 2017-09-05 DIAGNOSIS — A04.8 H. PYLORI INFECTION: Primary | ICD-10-CM

## 2017-09-05 DIAGNOSIS — D50.9 IRON DEFICIENCY ANEMIA, UNSPECIFIED IRON DEFICIENCY ANEMIA TYPE: ICD-10-CM

## 2017-09-05 DIAGNOSIS — R10.31 CHRONIC BILATERAL LOWER ABDOMINAL PAIN: ICD-10-CM

## 2017-09-05 PROCEDURE — 99213 OFFICE O/P EST LOW 20 MIN: CPT | Mod: S$GLB,,, | Performed by: NURSE PRACTITIONER

## 2017-09-05 PROCEDURE — 99999 PR PBB SHADOW E&M-EST. PATIENT-LVL III: CPT | Mod: PBBFAC,,, | Performed by: NURSE PRACTITIONER

## 2017-09-05 PROCEDURE — 3008F BODY MASS INDEX DOCD: CPT | Mod: S$GLB,,, | Performed by: NURSE PRACTITIONER

## 2017-09-05 NOTE — PROGRESS NOTES
[facility] Note    Reason for Visit:  The primary encounter diagnosis was H. pylori infection. Diagnoses of Chronic bilateral lower abdominal pain and Iron deficiency anemia, unspecified iron deficiency anemia type were also pertinent to this visit.    PCP:   Mariah Livingston       Referring MD:  No referring provider defined for this encounter.    HPI:  This is a 31 y.o. female here for follow up evaluation of abdominal pain. Ms. Lyon was last seen in clinic 7/3/17.     Hx of having abdominal pain since after childbirth 9/30/17. Located to lower abdomen. Occurs a couple times per month. Feels cramping. Occurs if does not eat or if hungry. Relief after eating. Pain scale 6-7/10. Last visit H. Pylori antibody positive. Completed triple therapy. EGD and Colonoscopy done 8/7/17 and normal no H. Pylori present. Currently, pt reports abdominal pain has resolved. Will have abdominal cramping prior to menstrual cycle.     Having a normal formed stool daily or every other day. Hx of anemia since 2006. Pt reports heavy menstrual cycles. PCP following [facility] and taking Iron supplements daily. Hx of endometriosis. Denies blood in stool and black tarry stool.     Denies reflux, dysphagia, odynophagia. NSAID usage- Will take Ibuprofen a couple times per month. Currently, not taking Ibuprofen.     ROS:  Constitutional: No fevers, no chills, No unintentional weight loss, no fatigue   ENT: No allergies  CV: No chest pain, no palpitations, no perif. edema  Pulm: No cough, No shortness of breath, no wheezes, no sputum  Ophtho: No vision changes  GI: see HPI; also no nausea, no vomiting, no change in appetite  Derm: No rash  Heme: No lymphadenopathy, No bruising  MSK: No arthritis, no muscle pain, no muscle weakness  : No dysuria, No hematuria  Endo: No hot or cold intolerance  Neuro: No syncope, No seizure     Medical History:  has a past medical history of HPV (human papilloma virus) infection and PID  "(pelvic inflammatory disease).    Surgical History:  has a past surgical history that includes Hysteroscopy; In Vitro (2015); fallopian tube removal (Bilateral);  section; and Colonoscopy (N/A, 2017).    Family History: family history includes Heart disease in her father; No Known Problems in her daughter..     Social History:  reports that she has never smoked. She has never used smokeless tobacco. She reports that she drinks alcohol. She reports that she does not use drugs.    Review of patient's allergies indicates:  No Known Allergies    Current Outpatient Prescriptions   Medication Sig    ferrous sulfate 325 mg (65 mg iron) Tab tablet Take 1 tablet (325 mg total) by mouth 2 (two) times daily.    fluconazole (DIFLUCAN) 150 MG Tab Take one pill my mouth today (Day 1), one on Day 4, and one on Day 7.     No current facility-administered medications for this visit.      Objective Findings:    Vital Signs:  /68   Pulse 87   Ht 5' 6" (1.676 m)   Wt 87.3 kg (192 lb 7.4 oz)   BMI 31.06 kg/m²   Body mass index is 31.06 kg/m².    Physical Exam:  General Appearance: Well appearing in no acute distress  Head: Normocephalic, without obvious abnormality  Eyes: No scleral icterus, EOMI  ENT: Neck supple, Lips, mucosa, and tongue normal; teeth and gums normal  Extremities: No clubbing, cyanosis or edema  Skin: No rash to exposed areas  Neurologic: AAOx4    Labs:  Lab Results   Component Value Date    WBC 4.34 2017    HGB 9.2 (L) 2017    HCT 31.1 (L) 2017     2017    CHOL 158 2017    TRIG 51 2017    HDL 57 2017    ALT 11 2017    AST 12 2017     2017    K 4.7 2017     2017    CREATININE 0.7 2017    BUN 21 (H) 2017    CO2 25 2017    TSH 0.843 2016    INR 1.1 10/01/2016     Imaging:  Abdominal CT- 17- 4.5 cm complex cystic lesion in the right adnexal region may relate to hemorrhagic cyst " and could be further evaluated with dedicated pelvic ultrasound. No evidence of bowel obstruction or inflammation.     Endoscopy:    EGD-  8/7/17 Dr. Zaidi- Normal, bx stomach- negative for H. Pylori.     Colonoscopy- 8/7/17 Dr. Zaidi- colon normal.     Assessment:  1. H. pylori infection    2. Chronic bilateral lower abdominal pain    3. Iron deficiency anemia, unspecified iron deficiency anemia type      Recommendations:  1. H. Pylori infection eradicated and negative on EGD.   2. Abdominal pain has resolved if reoccurs notify clinic.   3. Continue taking Iron supplement daily.     Follow up as needed and pt understood.      Thank you so much for allowing me to participate in the care of RASHAD Jamil, FNP-C

## 2017-09-22 ENCOUNTER — LAB VISIT (OUTPATIENT)
Dept: LAB | Facility: HOSPITAL | Age: 32
End: 2017-09-22
Attending: INTERNAL MEDICINE
Payer: COMMERCIAL

## 2017-09-22 DIAGNOSIS — D64.9 ANEMIA, UNSPECIFIED TYPE: ICD-10-CM

## 2017-09-22 LAB
ANISOCYTOSIS BLD QL SMEAR: SLIGHT
BASOPHILS # BLD AUTO: 0.02 K/UL
BASOPHILS NFR BLD: 0.5 %
DIFFERENTIAL METHOD: ABNORMAL
EOSINOPHIL # BLD AUTO: 0.2 K/UL
EOSINOPHIL NFR BLD: 3.8 %
ERYTHROCYTE [DISTWIDTH] IN BLOOD BY AUTOMATED COUNT: 20.5 %
HCT VFR BLD AUTO: 32.8 %
HGB BLD-MCNC: 10.4 G/DL
LYMPHOCYTES # BLD AUTO: 1.7 K/UL
LYMPHOCYTES NFR BLD: 39.5 %
MCH RBC QN AUTO: 20.1 PG
MCHC RBC AUTO-ENTMCNC: 31.7 G/DL
MCV RBC AUTO: 63 FL
MONOCYTES # BLD AUTO: 0.2 K/UL
MONOCYTES NFR BLD: 5 %
NEUTROPHILS # BLD AUTO: 2.1 K/UL
NEUTROPHILS NFR BLD: 51.2 %
OVALOCYTES BLD QL SMEAR: ABNORMAL
PLATELET # BLD AUTO: 246 K/UL
PLATELET BLD QL SMEAR: ABNORMAL
PMV BLD AUTO: ABNORMAL FL
POIKILOCYTOSIS BLD QL SMEAR: SLIGHT
POLYCHROMASIA BLD QL SMEAR: ABNORMAL
RBC # BLD AUTO: 5.17 M/UL
WBC # BLD AUTO: 4.18 K/UL

## 2017-09-22 PROCEDURE — 36415 COLL VENOUS BLD VENIPUNCTURE: CPT

## 2017-09-22 PROCEDURE — 85025 COMPLETE CBC W/AUTO DIFF WBC: CPT

## 2017-09-23 ENCOUNTER — TELEPHONE (OUTPATIENT)
Dept: INTERNAL MEDICINE | Facility: CLINIC | Age: 32
End: 2017-09-23

## 2017-11-28 ENCOUNTER — OFFICE VISIT (OUTPATIENT)
Dept: OBSTETRICS AND GYNECOLOGY | Facility: CLINIC | Age: 32
End: 2017-11-28
Attending: OBSTETRICS & GYNECOLOGY
Payer: COMMERCIAL

## 2017-11-28 VITALS
BODY MASS INDEX: 32.59 KG/M2 | HEIGHT: 66 IN | DIASTOLIC BLOOD PRESSURE: 80 MMHG | SYSTOLIC BLOOD PRESSURE: 124 MMHG | WEIGHT: 202.81 LBS

## 2017-11-28 DIAGNOSIS — Z01.419 ENCOUNTER FOR GYNECOLOGICAL EXAMINATION: ICD-10-CM

## 2017-11-28 DIAGNOSIS — Z11.51 ENCOUNTER FOR SCREENING FOR HUMAN PAPILLOMAVIRUS (HPV): Primary | ICD-10-CM

## 2017-11-28 DIAGNOSIS — Z12.4 ENCOUNTER FOR PAPANICOLAOU SMEAR FOR CERVICAL CANCER SCREENING: ICD-10-CM

## 2017-11-28 DIAGNOSIS — N94.6 DYSMENORRHEA: ICD-10-CM

## 2017-11-28 DIAGNOSIS — N80.9 ENDOMETRIOSIS DETERMINED BY LAPAROSCOPY: ICD-10-CM

## 2017-11-28 PROCEDURE — 99395 PREV VISIT EST AGE 18-39: CPT | Mod: S$GLB,,, | Performed by: OBSTETRICS & GYNECOLOGY

## 2017-11-28 PROCEDURE — 87624 HPV HI-RISK TYP POOLED RSLT: CPT

## 2017-11-28 PROCEDURE — 99999 PR PBB SHADOW E&M-EST. PATIENT-LVL III: CPT | Mod: PBBFAC,,, | Performed by: OBSTETRICS & GYNECOLOGY

## 2017-11-28 PROCEDURE — 88175 CYTOPATH C/V AUTO FLUID REDO: CPT

## 2017-11-28 RX ORDER — NORGESTIMATE AND ETHINYL ESTRADIOL 0.25-0.035
1 KIT ORAL DAILY
Qty: 28 TABLET | Refills: 11 | Status: SHIPPED | OUTPATIENT
Start: 2017-11-28 | End: 2018-12-06

## 2017-11-28 NOTE — PROGRESS NOTES
CC: Well woman exam    Syed Lyon is a 32 y.o. female  presents for a well woman exam.  She is established.  LMP: Patient's last menstrual period was 2017..      Annual Exam, c/o dysmenorrhea and py with a hx of endo Not on OCP   last pap 3-2017 hpv positive, pap normal  Health Maintenance   Topic Date Due    TETANUS VACCINE  2004    Influenza Vaccine  2017    Pap Smear with HPV Cotest  2020    Lipid Panel  Completed         Past Medical History:   Diagnosis Date    Abnormal Pap smear of cervix     cryo yrs. ago, then 3-2017 hpv positive, pap normal,  colpo ECC normal,     Endometriosis     per dx lap Bad endo per pt    HPV (human papilloma virus) infection     PID (pelvic inflammatory disease)        Past Surgical History:   Procedure Laterality Date     SECTION      COLONOSCOPY N/A 2017    Procedure: COLONOSCOPY;  Surgeon: Jose Zaidi MD;  Location: [facility] ([address] [place] [address]);  Service: Endoscopy;  Laterality: N/A;    fallopian tube removal Bilateral     GYNECOLOGIC CRYOSURGERY      HYSTEROSCOPY      2015    In Vitro      Dr Anuja Santana       OB History    Para Term  AB Living   1 1   1   1   SAB TAB Ectopic Multiple Live Births         0 1      # Outcome Date GA Lbr Dustin/2nd Weight Sex Delivery Anes PTL Lv   1  16 35w5d  2.608 kg (5 lb 12 oz) F CS-LTranv Spinal N OCTAVIANO          Family History   Problem Relation Age of Onset    Heart disease Father     No Known Problems Mother     No Known Problems Daughter     Meningitis Brother     Breast cancer Neg Hx     Colon cancer Neg Hx     Ovarian cancer Neg Hx     Cancer Neg Hx     Colon polyps Neg Hx     Celiac disease Neg Hx     Esophageal cancer Neg Hx     Stomach cancer Neg Hx     Irritable bowel syndrome Neg Hx     Inflammatory bowel disease Neg Hx        Social History   Substance Use Topics    Smoking status: Never Smoker    Smokeless tobacco: Never  "Used    Alcohol use Yes      Comment: occasional       /80   Ht 5' 6" (1.676 m)   Wt 92 kg (202 lb 13.2 oz)   LMP 11/08/2017   Breastfeeding? No   BMI 32.74 kg/m²       ROS:  GENERAL: Denies weight gain or weight loss. Feeling well overall.   SKIN: Denies rash or lesions.   HEAD: Denies head injury or headache.   NODES: Denies enlarged lymph nodes.   CHEST: Denies chest pain or shortness of breath.   CARDIOVASCULAR: Denies palpitations or left sided chest pain.   ABDOMEN: No abdominal pain, constipation, diarrhea, nausea, vomiting or rectal bleeding.   URINARY: No frequency, dysuria, hematuria, or burning on urination.      Physical Exam:    APPEARANCE: Well nourished, well developed, in no acute distress.  AFFECT: WNL, alert and oriented x 3  SKIN: No acne or hirsutism  ABDOMEN: Soft.  No tenderness or masses.  No hepatosplenomegaly.  No hernias.  BREASTS: Symmetrical, no skin changes or visible lesions.  No palpable masses, nipple discharge bilaterally.  PELVIC: Normal external genitalia without lesions.  Normal hair distribution.  Adequate perineal body, normal urethral meatus.  Vagina moist and well rugated without lesions or discharge.  Cervix pink, without lesions, discharge or tenderness.  No significant cystocele or rectocele.  Bimanual exam shows uterus to be normal size, regular, mobile and nontender.  Adnexa without masses or tenderness.    EXTREMITIES: No edema.    ASSESSMENT AND PLAN  1. Encounter for screening for human papillomavirus (HPV)  HPV High Risk Genotypes, PCR   2. Encounter for Papanicolaou smear for cervical cancer screening  Liquid-based pap smear, screening   3. Encounter for gynecological examination     4. Endometriosis determined by laparoscopy  norgestimate-ethinyl estradiol (ORTHO-CYCLEN) 0.25-35 mg-mcg per tablet   5. Dysmenorrhea  norgestimate-ethinyl estradiol (ORTHO-CYCLEN) 0.25-35 mg-mcg per tablet  If not better in 3-6 months Pt to let me know       Patient was " counseled today on A.C.S. Pap guidelines and recommendations for yearly pelvic exams, mammograms and monthly self breast exams; to see her PCP for other health maintenance.     Return in about 1 year (around 11/28/2018).

## 2017-12-01 LAB
HPV16 AG SPEC QL: NEGATIVE
HPV16+18+H RISK 12 DNA CVX-IMP: POSITIVE
HPV18 DNA SPEC QL NAA+PROBE: NEGATIVE

## 2017-12-04 ENCOUNTER — PATIENT MESSAGE (OUTPATIENT)
Dept: OBSTETRICS AND GYNECOLOGY | Facility: CLINIC | Age: 32
End: 2017-12-04

## 2017-12-04 NOTE — PROGRESS NOTES
Syed,  Your pap is still normal but your HPV is positive.  Since we already did a colposcopy this year, there is no further instruction but to keep your appointment for 6 months.  Take care and have a blesssed holiday!   Dr Winters

## 2018-01-03 ENCOUNTER — OFFICE VISIT (OUTPATIENT)
Dept: INTERNAL MEDICINE | Facility: CLINIC | Age: 33
End: 2018-01-03
Payer: COMMERCIAL

## 2018-01-03 ENCOUNTER — LAB VISIT (OUTPATIENT)
Dept: LAB | Facility: HOSPITAL | Age: 33
End: 2018-01-03
Attending: INTERNAL MEDICINE
Payer: COMMERCIAL

## 2018-01-03 VITALS
DIASTOLIC BLOOD PRESSURE: 70 MMHG | BODY MASS INDEX: 32.95 KG/M2 | HEART RATE: 75 BPM | SYSTOLIC BLOOD PRESSURE: 130 MMHG | WEIGHT: 205 LBS | HEIGHT: 66 IN | TEMPERATURE: 98 F | OXYGEN SATURATION: 99 %

## 2018-01-03 DIAGNOSIS — D64.9 ANEMIA, UNSPECIFIED TYPE: ICD-10-CM

## 2018-01-03 DIAGNOSIS — D64.9 ANEMIA, UNSPECIFIED TYPE: Primary | ICD-10-CM

## 2018-01-03 LAB
BASOPHILS # BLD AUTO: 0.03 K/UL
BASOPHILS NFR BLD: 0.5 %
DIFFERENTIAL METHOD: ABNORMAL
EOSINOPHIL # BLD AUTO: 0.3 K/UL
EOSINOPHIL NFR BLD: 4.9 %
ERYTHROCYTE [DISTWIDTH] IN BLOOD BY AUTOMATED COUNT: 19.3 %
HCT VFR BLD AUTO: 36.2 %
HGB BLD-MCNC: 10.5 G/DL
LYMPHOCYTES # BLD AUTO: 1.9 K/UL
LYMPHOCYTES NFR BLD: 33.7 %
MCH RBC QN AUTO: 19.9 PG
MCHC RBC AUTO-ENTMCNC: 29 G/DL
MCV RBC AUTO: 69 FL
MONOCYTES # BLD AUTO: 0.7 K/UL
MONOCYTES NFR BLD: 11.8 %
NEUTROPHILS # BLD AUTO: 2.7 K/UL
NEUTROPHILS NFR BLD: 48.9 %
NRBC BLD-RTO: 0 /100 WBC
PLATELET # BLD AUTO: 244 K/UL
PMV BLD AUTO: ABNORMAL FL
RBC # BLD AUTO: 5.27 M/UL
WBC # BLD AUTO: 5.49 K/UL

## 2018-01-03 PROCEDURE — 99213 OFFICE O/P EST LOW 20 MIN: CPT | Mod: S$GLB,,, | Performed by: INTERNAL MEDICINE

## 2018-01-03 PROCEDURE — 85025 COMPLETE CBC W/AUTO DIFF WBC: CPT

## 2018-01-03 PROCEDURE — 99999 PR PBB SHADOW E&M-EST. PATIENT-LVL III: CPT | Mod: PBBFAC,,, | Performed by: INTERNAL MEDICINE

## 2018-01-03 PROCEDURE — 36415 COLL VENOUS BLD VENIPUNCTURE: CPT

## 2018-01-06 ENCOUNTER — TELEPHONE (OUTPATIENT)
Dept: INTERNAL MEDICINE | Facility: CLINIC | Age: 33
End: 2018-01-06

## 2018-01-06 DIAGNOSIS — D64.9 ANEMIA, UNSPECIFIED TYPE: Primary | ICD-10-CM

## 2018-01-06 NOTE — TELEPHONE ENCOUNTER
Please contact patient and inform her that her labwork shows she is still mildly anemic. Please ask her if she has been taking her iron supplement

## 2018-01-07 NOTE — PROGRESS NOTES
Subjective:       Patient ID: Syed Lyon is a 32 y.o. female.    Chief Complaint: Follow-up    HPI: She has a history of iron deficiency anemia.  Currently on iron supplement    Past medical history: Iron deficiency anemia    Medications: Birth control pills, ferrous sulfate    NO KNOWN DRUG ALLERGIES      Review of Systems   Constitutional: Negative for chills, fatigue, fever and unexpected weight change.   Respiratory: Negative for chest tightness and shortness of breath.    Cardiovascular: Negative for chest pain and palpitations.   Gastrointestinal: Negative for abdominal pain and blood in stool.   Neurological: Negative for dizziness, syncope, numbness and headaches.       Objective:      Physical Exam   HENT:   Right Ear: External ear normal.   Left Ear: External ear normal.   Nose: Nose normal.   Mouth/Throat: Oropharynx is clear and moist.   Eyes: Pupils are equal, round, and reactive to light.   Neck: Normal range of motion.   Cardiovascular: Normal rate and regular rhythm.    No murmur heard.  Pulmonary/Chest: Breath sounds normal.   Abdominal: She exhibits no distension. There is no hepatosplenomegaly. There is no tenderness.   Lymphadenopathy:     She has no cervical adenopathy.     She has no axillary adenopathy.   Neurological: She has normal strength and normal reflexes. No cranial nerve deficit or sensory deficit.       Assessment:     assessment and plan: Anemia: Check CBC      Plan:       As above

## 2018-01-08 RX ORDER — ACYCLOVIR 50 MG/G
OINTMENT TOPICAL
Qty: 15 G | Refills: 0 | Status: SHIPPED | OUTPATIENT
Start: 2018-01-08 | End: 2018-01-12

## 2018-01-08 NOTE — TELEPHONE ENCOUNTER
Bone pt - pt called to request an rx for zovirax. She said her previous obgyn used to prescribe it to her and she needs a refill.  Rx - [facility] [phone]

## 2018-01-08 NOTE — TELEPHONE ENCOUNTER
Pt called back I explained that she is still mildly anemic pt stated she is taking her medication.

## 2018-01-08 NOTE — TELEPHONE ENCOUNTER
Pt is asking for a refill of Zovirax cream.  She only uses it with an outbreak and it works better than the pills for her.     Zovirax pended

## 2018-01-09 NOTE — TELEPHONE ENCOUNTER
Spoke with pt and she stated verbalized understanding in attempt to schedule, schedule was blocked message error stated new pts should be seen within 3 days nothing available in attempt to contact shanthi santo as instructed in message @ ext [phone] number was invalid.  Contacted pt again gave her hematology number will attempt to reach out to shanthi santo.

## 2018-01-09 NOTE — TELEPHONE ENCOUNTER
Please let her know that unfortunately, that does not seem to be working for her. I would like for her to see hematology. Order in,please schedule

## 2018-01-10 NOTE — TELEPHONE ENCOUNTER
Spoke with ms garcia from hematology and she stated they will reach out to the pt to schedule appt to ensure pt will make the scheduled appt

## 2018-01-12 NOTE — TELEPHONE ENCOUNTER
Acyclovir cream is not covered under patient's insurance, but the pills are. Pharm is requesting a Rx for the pills form.    Allergies and Pharm UTD  Acyclovir pended

## 2018-01-12 NOTE — TELEPHONE ENCOUNTER
Pharmacy called and stated the acyclovir 5% (ZOVIRAX) 5 % ointment isn't covered by the pt's insurance. Pharmacy states that the tablets are, and has requested the tablets instead.

## 2018-01-13 RX ORDER — ACYCLOVIR 400 MG/1
400 TABLET ORAL 3 TIMES DAILY
Qty: 30 TABLET | Refills: 0 | Status: SHIPPED | OUTPATIENT
Start: 2018-01-13 | End: 2018-12-06

## 2018-01-19 ENCOUNTER — TELEPHONE (OUTPATIENT)
Dept: HEMATOLOGY/ONCOLOGY | Facility: CLINIC | Age: 33
End: 2018-01-19

## 2018-01-23 ENCOUNTER — TELEPHONE (OUTPATIENT)
Dept: HEMATOLOGY/ONCOLOGY | Facility: CLINIC | Age: 33
End: 2018-01-23

## 2018-05-29 ENCOUNTER — HOSPITAL ENCOUNTER (EMERGENCY)
Facility: HOSPITAL | Age: 33
Discharge: HOME | End: 2018-05-29
Attending: FAMILY MEDICINE | Admitting: FAMILY MEDICINE
Payer: MEDICAID

## 2018-05-29 VITALS
BODY MASS INDEX: 32.14 KG/M2 | WEIGHT: 200 LBS | HEIGHT: 66 IN | TEMPERATURE: 98 F | RESPIRATION RATE: 16 BRPM | HEART RATE: 102 BPM | OXYGEN SATURATION: 99 % | SYSTOLIC BLOOD PRESSURE: 112 MMHG | DIASTOLIC BLOOD PRESSURE: 74 MMHG

## 2018-05-29 DIAGNOSIS — S63.602A SPRAIN OF LEFT THUMB, UNSPECIFIED SITE OF FINGER, INITIAL ENCOUNTER: Primary | ICD-10-CM

## 2018-05-29 LAB
B-HCG UR QL: NEGATIVE
CTP QC/QA: YES

## 2018-05-29 PROCEDURE — 29130 APPL FINGER SPLINT STATIC: CPT | Mod: FA

## 2018-05-29 PROCEDURE — 81025 URINE PREGNANCY TEST: CPT | Performed by: PHYSICIAN ASSISTANT

## 2018-05-29 PROCEDURE — 25000003 PHARM REV CODE 250: Performed by: PHYSICIAN ASSISTANT

## 2018-05-29 PROCEDURE — 99284 EMERGENCY DEPT VISIT MOD MDM: CPT | Mod: ,,, | Performed by: PHYSICIAN ASSISTANT

## 2018-05-29 PROCEDURE — 99284 EMERGENCY DEPT VISIT MOD MDM: CPT | Mod: 25

## 2018-05-29 RX ORDER — NAPROXEN 500 MG/1
500 TABLET ORAL 2 TIMES DAILY WITH MEALS
Qty: 30 TABLET | Refills: 0 | Status: SHIPPED | OUTPATIENT
Start: 2018-05-29 | End: 2018-12-06

## 2018-05-29 RX ORDER — ACETAMINOPHEN 500 MG
1000 TABLET ORAL
Status: COMPLETED | OUTPATIENT
Start: 2018-05-29 | End: 2018-05-29

## 2018-05-29 RX ADMIN — ACETAMINOPHEN 1000 MG: 500 TABLET, FILM COATED ORAL at 12:05

## 2018-05-29 NOTE — ED TRIAGE NOTES
C/o pain left thumb, 2 days ago trying to pull down legs on a child potty and bent thumb backward. Swelling began this am and hurts. Afraid it may be out of place.  Bending but w/ pain .

## 2018-05-29 NOTE — ED PROVIDER NOTES
"Encounter Date: 2018       History     Chief Complaint   Patient presents with    Thumb injury     L thumb bent back  and today     32-year-old female with no reported medical history presents to the ED with a left thumb injury. Patient reports injuring her left thumb 3 days ago while adjusting her portable toilet for her toddler. She reports pulling down one of the legs for the toilet and believes she hyperextended her thumb in the process. She reports it looked "dislocated." She reports a similar episode occurred this morning while dressing her child for school. She reports having pain and swelling to her left thumb. She reports the pain worsens with movements. She reports injuring her left thumb several years ago in a similar fashion but denies seeking medical attention. She denies prior surgeries on her left hand. No other joint involvement. She denies family history of lupus and other rheumatological conditions. She denies taking any medications for her symptoms.           Review of patient's allergies indicates:  No Known Allergies  Past Medical History:   Diagnosis Date    Abnormal Pap smear of cervix     cryo yrs. ago, then 3- hpv positive, pap normal, 2017 colpo ECC normal,     Endometriosis     per dx lap Bad endo per pt    HPV (human papilloma virus) infection     PID (pelvic inflammatory disease)      Past Surgical History:   Procedure Laterality Date     SECTION      COLONOSCOPY N/A 2017    Procedure: COLONOSCOPY;  Surgeon: Jose Zaidi MD;  Location: [facility] ([address] [place] [address]);  Service: Endoscopy;  Laterality: N/A;    fallopian tube removal Bilateral     GYNECOLOGIC CRYOSURGERY      HYSTEROSCOPY      2015    In Vitro      Dr Anuja Santana     Family History   Problem Relation Age of Onset    Heart disease Father     No Known Problems Mother     No Known Problems Daughter     Meningitis Brother     Breast cancer Neg Hx     Colon cancer Neg Hx     " Ovarian cancer Neg Hx     Cancer Neg Hx     Colon polyps Neg Hx     Celiac disease Neg Hx     Esophageal cancer Neg Hx     Stomach cancer Neg Hx     Irritable bowel syndrome Neg Hx     Inflammatory bowel disease Neg Hx      Social History   Substance Use Topics    Smoking status: Never Smoker    Smokeless tobacco: Never Used    Alcohol use Yes      Comment: occasional     Review of Systems   Constitutional: Negative for chills and fever.   HENT: Negative for sore throat.    Respiratory: Negative for shortness of breath.    Cardiovascular: Negative for chest pain.   Gastrointestinal: Negative for nausea.   Genitourinary: Negative for dysuria.   Musculoskeletal: Positive for arthralgias and joint swelling. Negative for back pain.   Skin: Negative for rash and wound.   Neurological: Negative for weakness.   Hematological: Does not bruise/bleed easily.       Physical Exam     Initial Vitals [05/29/18 1017]   BP Pulse Resp Temp SpO2   112/74 102 16 97.9 °F (36.6 °C) 99 %      MAP       86.67         Physical Exam    Constitutional: She appears well-developed and well-nourished. She is not diaphoretic. No distress.   HENT:   Head: Normocephalic and atraumatic.   Mouth/Throat: Oropharynx is clear and moist. No oropharyngeal exudate.   Eyes: EOM are normal. Pupils are equal, round, and reactive to light.   Neck: Normal range of motion. Neck supple.   Cardiovascular: Normal rate and regular rhythm.   Pulmonary/Chest: Breath sounds normal. No respiratory distress.   Abdominal: Soft. Bowel sounds are normal. There is no tenderness.   Musculoskeletal:        Left hand: Comments: TTP and edema to L thenar eminence and along radial aspect of 1st metacarpal. Limited 1st MCP flexion 2/2 pain. No IP joint ROM deficits. No snuff box tenderness.        Hands:  Neurological: She is alert and oriented to person, place, and time. She has normal strength. No cranial nerve deficit.   Skin: Skin is warm and dry.       Imaging  "Results          X-Ray Finger 2 or More Views Left (Final result)  Result time 05/29/18 12:48:02    Final result by Jacob Michaels MD (05/29/18 12:48:02)                 Impression:      1. No acute displaced fracture or dislocation of the thumb.      Electronically signed by: Jacob Michaels MD  Date:    05/29/2018  Time:    12:48             Narrative:    EXAMINATION:  XR FINGER 2 OR MORE VIEWS LEFT    CLINICAL HISTORY:  left thumb injury 3 days ago and today; hyperextended and "felt like it was dislocated";    COMPARISON:  None    FINDINGS:  Three views left thumb.    No acute displaced fracture or dislocation of the thumb.  No radiopaque foreign body.                              ED Course   Procedures  Labs Reviewed   POCT URINE PREGNANCY                   APC / Resident Notes:   32-year-old female with no reported medical history presents to the ED with a left thumb injury occurring 3 days ago. DDx includes but not limited to muscular strain, tendosynovitis, dislocation, fracture. I have considered but do not suspect septic arthritis or rheumatological disease. Will get x-ray of the left thumb. Will give tylenol for pain.     X-ray negative for fracture and other acute processes. Etiology most consistent with gamekeeper's thumb. Will place in thumb spica splint and give RX for Naprosyn. Will have patient follow-up in hand clinic. Patient stable on reassessment. Return to ED precautions given for new, worsening, or concerning symptoms. I have discussed the care of this patient with my supervising physician.                  Clinical Impression:   The encounter diagnosis was Sprain of left thumb, unspecified site of finger, initial encounter.    Disposition:   Disposition: Discharged  Condition: Stable                        Marcell Pizano PA-C  05/29/18 1806    "

## 2018-05-29 NOTE — DISCHARGE INSTRUCTIONS
Please follow-up with our hand specialist using the contact information below for evaluation of your thumb. Please wear the provided wrist splint for protection and ongoing management of your thumb. Please take the Naprosyn as prescribed for managing your pain. Please return to the emergency room for new, worsening, or concerning symptoms.     Our goal in the emergency department is to always give you outstanding care and exceptional service. You may receive a survey by mail or e-mail in the next week regarding your experience in our ED. We would greatly appreciate your completing and returning the survey. Your feedback provides us with a way to recognize our staff who give very good care and it helps us learn how to improve when your experience was below our aspiration of excellence.

## 2018-09-11 ENCOUNTER — TELEPHONE (OUTPATIENT)
Dept: INTERNAL MEDICINE | Facility: CLINIC | Age: 33
End: 2018-09-11

## 2018-09-11 NOTE — TELEPHONE ENCOUNTER
----- Message from Amairani Cleveland sent at 9/11/2018 11:04 AM CDT -----  Contact: PT Portal Request  Appointment Request From: Syed Lyon    With Provider: Mariah Livingston MD [[facility]]    Preferred Date Range: 9/10/2018 - 10/10/2018    Preferred Times: Any time    Reason for visit: Existing Patient    Comments:  Check up

## 2018-10-23 ENCOUNTER — OFFICE VISIT (OUTPATIENT)
Dept: INTERNAL MEDICINE | Facility: CLINIC | Age: 33
End: 2018-10-23
Payer: MEDICAID

## 2018-10-23 VITALS
HEIGHT: 66 IN | OXYGEN SATURATION: 99 % | HEART RATE: 70 BPM | DIASTOLIC BLOOD PRESSURE: 74 MMHG | BODY MASS INDEX: 33.1 KG/M2 | SYSTOLIC BLOOD PRESSURE: 110 MMHG | WEIGHT: 205.94 LBS

## 2018-10-23 DIAGNOSIS — Z01.419 ENCOUNTER FOR WELL WOMAN EXAM WITH ROUTINE GYNECOLOGICAL EXAM: ICD-10-CM

## 2018-10-23 DIAGNOSIS — D64.9 ANEMIA, UNSPECIFIED TYPE: Primary | ICD-10-CM

## 2018-10-23 DIAGNOSIS — R21 RASH: ICD-10-CM

## 2018-10-23 PROCEDURE — 99999 PR PBB SHADOW E&M-EST. PATIENT-LVL IV: CPT | Mod: PBBFAC,,, | Performed by: INTERNAL MEDICINE

## 2018-10-23 PROCEDURE — 99214 OFFICE O/P EST MOD 30 MIN: CPT | Mod: PBBFAC | Performed by: INTERNAL MEDICINE

## 2018-10-23 PROCEDURE — 99214 OFFICE O/P EST MOD 30 MIN: CPT | Mod: S$PBB,,, | Performed by: INTERNAL MEDICINE

## 2018-10-23 RX ORDER — HYDROCORTISONE 25 MG/G
CREAM TOPICAL 2 TIMES DAILY PRN
Qty: 30 G | Refills: 1 | Status: SHIPPED | OUTPATIENT
Start: 2018-10-23 | End: 2018-11-02

## 2018-10-23 RX ORDER — KETOCONAZOLE 20 MG/G
CREAM TOPICAL DAILY PRN
Qty: 15 G | Refills: 1 | Status: SHIPPED | OUTPATIENT
Start: 2018-10-23 | End: 2019-09-04

## 2018-10-27 ENCOUNTER — TELEPHONE (OUTPATIENT)
Dept: INTERNAL MEDICINE | Facility: CLINIC | Age: 33
End: 2018-10-27

## 2018-10-27 DIAGNOSIS — D64.9 ANEMIA, UNSPECIFIED TYPE: Primary | ICD-10-CM

## 2018-10-27 NOTE — TELEPHONE ENCOUNTER
Please contact patient and inform her that she remains anemic. I would like for her to see hematology. Order in,please schedule

## 2018-10-28 NOTE — PROGRESS NOTES
Subjective:       Patient ID: Syed Lyon is a 32 y.o. female.    Chief Complaint: Anemia    HPI  She returns for management of anemia.  She has had anemia for over a year.  Current treatment has included iron supplement.  She denies blood in stool.  Denies pain located in her chest    Medications:  See med list    Social history:  Does not smoke, does not drink alcohol      Review of Systems   Constitutional: Negative for chills, fatigue, fever and unexpected weight change.   Respiratory: Negative for chest tightness and shortness of breath.    Cardiovascular: Negative for chest pain and palpitations.   Gastrointestinal: Negative for abdominal pain and blood in stool.   Neurological: Negative for dizziness, syncope, numbness and headaches.       Objective:      Physical Exam   HENT:   Right Ear: External ear normal.   Left Ear: External ear normal.   Nose: Nose normal.   Mouth/Throat: Oropharynx is clear and moist.   Eyes: Pupils are equal, round, and reactive to light.   Neck: Normal range of motion.   Cardiovascular: Normal rate and regular rhythm.   No murmur heard.  Pulmonary/Chest: Breath sounds normal.   Abdominal: She exhibits no distension. There is no hepatosplenomegaly. There is no tenderness.   Lymphadenopathy:     She has no cervical adenopathy.     She has no axillary adenopathy.   Neurological: She has normal strength and normal reflexes. No cranial nerve deficit or sensory deficit.       Assessment/Plan       Assessment and plan:  Anemia:  Check CBC, iron studies, CMP, lipid panel

## 2018-11-07 ENCOUNTER — TELEPHONE (OUTPATIENT)
Dept: CARDIOTHORACIC SURGERY | Facility: CLINIC | Age: 33
End: 2018-11-07

## 2018-11-07 NOTE — TELEPHONE ENCOUNTER
Called to reschedule consult appointment with Hematology.  Agreeable with 11/14 @ 9:00 AM with Dr. Gonzalez.  Appointment slip mailed.

## 2018-11-08 ENCOUNTER — TELEPHONE (OUTPATIENT)
Dept: HEMATOLOGY/ONCOLOGY | Facility: CLINIC | Age: 33
End: 2018-11-08

## 2018-12-06 ENCOUNTER — INITIAL CONSULT (OUTPATIENT)
Dept: HEMATOLOGY/ONCOLOGY | Facility: CLINIC | Age: 33
End: 2018-12-06
Payer: MEDICAID

## 2018-12-06 VITALS
HEIGHT: 66 IN | TEMPERATURE: 99 F | BODY MASS INDEX: 33.91 KG/M2 | SYSTOLIC BLOOD PRESSURE: 121 MMHG | OXYGEN SATURATION: 100 % | WEIGHT: 211 LBS | DIASTOLIC BLOOD PRESSURE: 70 MMHG | HEART RATE: 79 BPM

## 2018-12-06 DIAGNOSIS — D50.8 OTHER IRON DEFICIENCY ANEMIA: ICD-10-CM

## 2018-12-06 DIAGNOSIS — D50.9 IRON DEFICIENCY ANEMIA, UNSPECIFIED IRON DEFICIENCY ANEMIA TYPE: Primary | ICD-10-CM

## 2018-12-06 PROCEDURE — 99213 OFFICE O/P EST LOW 20 MIN: CPT | Mod: PBBFAC | Performed by: STUDENT IN AN ORGANIZED HEALTH CARE EDUCATION/TRAINING PROGRAM

## 2018-12-06 PROCEDURE — 99203 OFFICE O/P NEW LOW 30 MIN: CPT | Mod: S$PBB,,, | Performed by: STUDENT IN AN ORGANIZED HEALTH CARE EDUCATION/TRAINING PROGRAM

## 2018-12-06 PROCEDURE — 99999 PR PBB SHADOW E&M-EST. PATIENT-LVL III: CPT | Mod: PBBFAC,,, | Performed by: STUDENT IN AN ORGANIZED HEALTH CARE EDUCATION/TRAINING PROGRAM

## 2018-12-06 RX ORDER — HEPARIN 100 UNIT/ML
500 SYRINGE INTRAVENOUS
Status: CANCELLED | OUTPATIENT
Start: 2018-12-14

## 2018-12-06 RX ORDER — SODIUM CHLORIDE 0.9 % (FLUSH) 0.9 %
10 SYRINGE (ML) INJECTION
Status: CANCELLED | OUTPATIENT
Start: 2018-12-06

## 2018-12-06 RX ORDER — HEPARIN 100 UNIT/ML
500 SYRINGE INTRAVENOUS
Status: CANCELLED | OUTPATIENT
Start: 2018-12-06

## 2018-12-06 RX ORDER — SODIUM CHLORIDE 0.9 % (FLUSH) 0.9 %
10 SYRINGE (ML) INJECTION
Status: CANCELLED | OUTPATIENT
Start: 2018-12-14

## 2018-12-06 NOTE — Clinical Note
Addendum: 1. injectafer plan submitted and needs authorization. Schedule her ASAP when approved.  2. Follow up 3/28/19 with CBC, TIBC iron, ferritin, vitamin B12, folate, and TSH thanks -e

## 2018-12-06 NOTE — PROGRESS NOTES
PATIENT: Syed Lyon  MRN: [id number]  DATE: 2018      Diagnosis:   1. Iron deficiency anemia, unspecified iron deficiency anemia type    2. Other iron deficiency anemia    d    Chief Complaint: Anemia      Oncologic History:   Oncologic History    Oncologic History      Oncologic Treatment      Pathology          Subjective:    Interval History: Ms. Lyon is here for new patient consultation for anemia.    Seen by PCP 10/27/18 . At this point she has had anemia for over a year and has failed oral iron supplement.  Denies constipation or GI upset from oral iron.  Was taking iron BID until 10/27/18.  She denies blood in stool.  Denies chest pain or shortness of breath.  Repeat blood work shows labs consistent with iron deficiency anemia.  No pica or brittle nails.  LMP 11/15/18. Heavy periods in the beginning then light towards the end. 5 days.  Heavy days 5 pads, light days 2 pads.   No history of anemia or sickle cell trait in family.    , IVF, delivered her daughter on 16 without any bleeding complications.    Past Medical History:   Past Medical History:   Diagnosis Date    Abnormal Pap smear of cervix     cryo yrs. ago, then 3- hpv positive, pap normal, 2017 colpo ECC normal,     Endometriosis     per dx lap Bad endo per pt    HPV (human papilloma virus) infection     PID (pelvic inflammatory disease)        Past Surgical HIstory:   Past Surgical History:   Procedure Laterality Date     SECTION      COLONOSCOPY N/A 2017    Procedure: COLONOSCOPY;  Surgeon: Jose Zaidi MD;  Location: [facility] (4TH FLR);  Service: Endoscopy;  Laterality: N/A;    COLONOSCOPY N/A 2017    Performed by Jose [facility]idi MD at [facility] (4TH FLR)    DELIVERY- SECTION N/A 2016    [facility]    ESOPHAGOGASTRODUODENOSCOPY (EGD) N/A 2017    Performed by Jose [facility]idi MD at [facility] (4TH FLR)    fallopian tube removal Bilateral      GYNECOLOGIC CRYOSURGERY      HYSTEROSCOPY      2015    HYSTEROSCOPY N/A 10/1/2015    [facility]formed by [facility] at [facility] OR    HYSTEROSCOPY N/A 2015    [facility]formed by [facility] at [facility] OR    In Vitro  2015    Dr Anuja Santana    POLYPECTOMY N/A 10/1/2015    [facility]formed by [facility] at [facility] OR    [id number] N/A 2015    Performed by [facility]ana MD at [facility] OR       Family History:   Family History   Problem Relation Age of Onset    Heart disease Father     No Known Problems Mother     No Known Problems Daughter     Meningitis Brother     Breast cancer Neg Hx     Colon cancer Neg Hx     Ovarian cancer Neg Hx     Cancer Neg Hx     Colon polyps Neg Hx     Celiac disease Neg Hx     Esophageal cancer Neg Hx     Stomach cancer Neg Hx     Irritable bowel syndrome Neg Hx     Inflammatory bowel disease Neg Hx    Had one brother who  when he was 5 from meningitis.  Has 2 half siblings but no history of anemia.  Father   Mother no history of anemia.    Social History:  reports that  has never smoked. she has never used smokeless tobacco. She reports that she drinks alcohol. She reports that she does not use drugs. 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    Allergies:  Review of patient's allergies indicates:  No Known Allergies    Medications:  Current Outpatient Medications   Medication Sig Dispense Refill    acyclovir (ZOVIRAX) 400 MG tablet Take 1 tablet (400 mg total) by mouth 3 (three) times daily. 30 tablet 0    ketoconazole (NIZORAL) 2 % cream Apply topically daily as needed. 15 g 1    naproxen (NAPROSYN) 500 MG tablet Take 1 tablet (500 mg total) by mouth 2 (two) times daily with meals. 30 tablet 0    norgestimate-ethinyl estradiol (ORTHO-CYCLEN) 0.25-35 mg-mcg per tablet Take 1 tablet by mouth once daily. 28 tablet 11     No current facility-administered medications for this  "visit.    Not on oral contraceptives.    Review of Systems   Constitutional: Negative for chills, diaphoresis, fatigue and fever.   HENT: Negative for nosebleeds.    Eyes: Negative for visual disturbance.   Respiratory: Negative for shortness of breath.    Cardiovascular: Negative for chest pain and leg swelling.   Gastrointestinal: Negative for abdominal distention, abdominal pain, anal bleeding, blood in stool, constipation, diarrhea and nausea.   Genitourinary: Positive for vaginal bleeding. Negative for hematuria.   Skin: Negative for color change.   Neurological: Negative for dizziness, weakness and light-headedness.   Hematological: Does not bruise/bleed easily.   Psychiatric/Behavioral: Negative for confusion.       ECOG Performance Status: 0   Objective:      Vitals:   Vitals:    12/06/18 1424   BP: 121/70   Pulse: 79   Temp: 98.7 °F (37.1 °C)   SpO2: 100%   Weight: 95.7 kg (211 lb)   Height: 5' 6" (1.676 m)     BMI: Body mass index is 34.06 kg/m².    Physical Exam   Constitutional: She appears well-developed.   HENT:   Head: Normocephalic.   Eyes: EOM are normal. Pupils are equal, round, and reactive to light.   Neck: Normal range of motion. No tracheal deviation present.   Cardiovascular: Normal rate, regular rhythm and normal heart sounds. Exam reveals no gallop and no friction rub.   No murmur heard.  Pulmonary/Chest: Effort normal and breath sounds normal. No respiratory distress. She has no wheezes. She has no rales.   Abdominal: Soft. Bowel sounds are normal. She exhibits no distension and no mass. There is no tenderness. There is no guarding.   Musculoskeletal: Normal range of motion. She exhibits no edema.   Lymphadenopathy:     She has no cervical adenopathy.   Neurological: She is alert.   Skin: Skin is warm and dry.   Psychiatric: She has a normal mood and affect.       Laboratory Data:   Iron   Date Value Ref Range Status   10/23/2018 61 30 - 160 ug/dL Final     Ferritin   Date Value Ref Range " Status   10/23/2018 9 (L) 20.0 - 300.0 ng/mL Final       10/23/18  Iron 30 - 160 ug/dL 61    Transferrin 200 - 375 mg/dL 325    TIBC 250 - 450 ug/dL 481 Abnormally high     Saturated Iron 20 - 50 % 13 Abnormally low           Imaging:    Assessment:       1. Iron deficiency anemia, unspecified iron deficiency anemia type    2. Other iron deficiency anemia           Plan:       1.  Iron deficiency anemia likely secondary to menstrual cycles.  Review of MCV has shown normal Hg with low MCV suggestive of possible underlying thalassemia.  -Will submit for injectafer  -RTC in 3 months with CBC, TIBC iron, ferritin, vitamin B12, folate, and TSH  --If MCV still low and all other reversible factors are corrected, will need hemoglobin electrophoresis  Orders Placed This Encounter   Procedures    CBC auto differential    IRON AND TIBC    FERRITIN    Vitamin B12    Folate    TSH           Anurag Gonzalez MD  Hematology Oncology Fellow PGY5  Distress Screening Results: Psychosocial Distress screening score of Distress Score: 0 noted and reviewed. No intervention indicated.

## 2018-12-13 ENCOUNTER — INFUSION (OUTPATIENT)
Dept: INFUSION THERAPY | Facility: HOSPITAL | Age: 33
End: 2018-12-13
Attending: STUDENT IN AN ORGANIZED HEALTH CARE EDUCATION/TRAINING PROGRAM
Payer: MEDICAID

## 2018-12-13 VITALS
BODY MASS INDEX: 33.91 KG/M2 | HEART RATE: 88 BPM | RESPIRATION RATE: 18 BRPM | HEIGHT: 66 IN | DIASTOLIC BLOOD PRESSURE: 59 MMHG | SYSTOLIC BLOOD PRESSURE: 119 MMHG | TEMPERATURE: 99 F | WEIGHT: 211 LBS

## 2018-12-13 DIAGNOSIS — D50.9 IRON DEFICIENCY ANEMIA, UNSPECIFIED IRON DEFICIENCY ANEMIA TYPE: Primary | ICD-10-CM

## 2018-12-13 PROCEDURE — 25000003 PHARM REV CODE 250: Performed by: STUDENT IN AN ORGANIZED HEALTH CARE EDUCATION/TRAINING PROGRAM

## 2018-12-13 PROCEDURE — 63600175 PHARM REV CODE 636 W HCPCS: Mod: JG | Performed by: STUDENT IN AN ORGANIZED HEALTH CARE EDUCATION/TRAINING PROGRAM

## 2018-12-13 PROCEDURE — 96365 THER/PROPH/DIAG IV INF INIT: CPT

## 2018-12-13 RX ORDER — SODIUM CHLORIDE 0.9 % (FLUSH) 0.9 %
10 SYRINGE (ML) INJECTION
Status: DISCONTINUED | OUTPATIENT
Start: 2018-12-13 | End: 2018-12-13 | Stop reason: HOSPADM

## 2018-12-13 RX ORDER — HEPARIN 100 UNIT/ML
500 SYRINGE INTRAVENOUS
Status: DISCONTINUED | OUTPATIENT
Start: 2018-12-13 | End: 2018-12-13 | Stop reason: HOSPADM

## 2018-12-13 RX ADMIN — FERRIC CARBOXYMALTOSE INJECTION 750 MG: 50 INJECTION, SOLUTION INTRAVENOUS at 03:12

## 2018-12-13 RX ADMIN — SODIUM CHLORIDE: 0.9 INJECTION, SOLUTION INTRAVENOUS at 03:12

## 2018-12-13 NOTE — PLAN OF CARE
Problem: Adult Inpatient Plan of Care  Goal: Plan of Care Review  Outcome: Ongoing (interventions implemented as appropriate)  Pt tolerated injectafer infusion without issue, pt to rtc in one week for 2nd infusion, no distress noted upon d/c to home

## 2018-12-13 NOTE — PLAN OF CARE
Problem: Anemia  Goal: Anemia Symptom Improvement    Intervention: Monitor and Manage Anemia  Pt here for injectafer #1 infusion, labs, hx, meds, allergies reviewed, pt with no complaints at this time, reclined in chair, continue to monitor

## 2018-12-20 ENCOUNTER — INFUSION (OUTPATIENT)
Dept: INFUSION THERAPY | Facility: HOSPITAL | Age: 33
End: 2018-12-20
Attending: INTERNAL MEDICINE
Payer: MEDICAID

## 2018-12-20 VITALS
DIASTOLIC BLOOD PRESSURE: 78 MMHG | RESPIRATION RATE: 18 BRPM | SYSTOLIC BLOOD PRESSURE: 116 MMHG | TEMPERATURE: 98 F | HEART RATE: 88 BPM

## 2018-12-20 DIAGNOSIS — D50.9 IRON DEFICIENCY ANEMIA, UNSPECIFIED IRON DEFICIENCY ANEMIA TYPE: Primary | ICD-10-CM

## 2018-12-20 PROCEDURE — 63600175 PHARM REV CODE 636 W HCPCS: Mod: JG | Performed by: STUDENT IN AN ORGANIZED HEALTH CARE EDUCATION/TRAINING PROGRAM

## 2018-12-20 PROCEDURE — 96365 THER/PROPH/DIAG IV INF INIT: CPT

## 2018-12-20 PROCEDURE — 25000003 PHARM REV CODE 250: Performed by: STUDENT IN AN ORGANIZED HEALTH CARE EDUCATION/TRAINING PROGRAM

## 2018-12-20 RX ORDER — SODIUM CHLORIDE 0.9 % (FLUSH) 0.9 %
10 SYRINGE (ML) INJECTION
Status: DISCONTINUED | OUTPATIENT
Start: 2018-12-20 | End: 2018-12-20 | Stop reason: HOSPADM

## 2018-12-20 RX ORDER — HEPARIN 100 UNIT/ML
500 SYRINGE INTRAVENOUS
Status: DISCONTINUED | OUTPATIENT
Start: 2018-12-20 | End: 2018-12-20 | Stop reason: HOSPADM

## 2018-12-20 RX ADMIN — SODIUM CHLORIDE: 0.9 INJECTION, SOLUTION INTRAVENOUS at 03:12

## 2018-12-20 RX ADMIN — FERRIC CARBOXYMALTOSE INJECTION 750 MG: 50 INJECTION, SOLUTION INTRAVENOUS at 03:12

## 2018-12-20 NOTE — PLAN OF CARE
Problem: Adult Inpatient Plan of Care  Goal: Plan of Care Review  Outcome: Ongoing (interventions implemented as appropriate)  Pt received injectafer ;tolerated well. VSS and NAD. Pt instructed to call MD with any concerns. Pt discharged home independently.

## 2019-04-24 ENCOUNTER — TELEPHONE (OUTPATIENT)
Dept: INTERNAL MEDICINE | Facility: CLINIC | Age: 34
End: 2019-04-24

## 2019-04-24 ENCOUNTER — PATIENT MESSAGE (OUTPATIENT)
Dept: INTERNAL MEDICINE | Facility: CLINIC | Age: 34
End: 2019-04-24

## 2019-04-24 NOTE — TELEPHONE ENCOUNTER
----- Message from Elizabeth Manuel sent at 4/24/2019  3:34 PM CDT -----  Contact: [email or web address]  Message from [facility], System Message sent at 4/24/2019 10:21 AM CDT -----    Appointment Request From: Syed Lyon    With Provider: Mariah Livingston MD [[facility] - Internal Medicine]    Preferred Date Range: 4/25/2019 - 4/30/2019    Preferred Times: Any time    Reason for visit: Existing Patient    Comments:  My allergies are really bad. My  throat hurts and I'm spitting up a lot of cold. When I blow my nose sometimes I see blood.

## 2019-04-24 NOTE — TELEPHONE ENCOUNTER
Left message from Dr. Livingston for pt to see UC with first available provider and to give us a call back if she has any questions.

## 2019-05-01 ENCOUNTER — OFFICE VISIT (OUTPATIENT)
Dept: INTERNAL MEDICINE | Facility: CLINIC | Age: 34
End: 2019-05-01
Payer: MEDICAID

## 2019-05-01 VITALS
HEIGHT: 66 IN | WEIGHT: 217.13 LBS | SYSTOLIC BLOOD PRESSURE: 125 MMHG | TEMPERATURE: 98 F | BODY MASS INDEX: 34.9 KG/M2 | HEART RATE: 65 BPM | OXYGEN SATURATION: 100 % | DIASTOLIC BLOOD PRESSURE: 70 MMHG

## 2019-05-01 DIAGNOSIS — J40 BRONCHITIS: Primary | ICD-10-CM

## 2019-05-01 DIAGNOSIS — H61.22 CERUMEN DEBRIS ON TYMPANIC MEMBRANE OF LEFT EAR: ICD-10-CM

## 2019-05-01 PROCEDURE — 99213 OFFICE O/P EST LOW 20 MIN: CPT | Mod: S$PBB,,, | Performed by: STUDENT IN AN ORGANIZED HEALTH CARE EDUCATION/TRAINING PROGRAM

## 2019-05-01 PROCEDURE — 99999 PR PBB SHADOW E&M-EST. PATIENT-LVL IV: ICD-10-PCS | Mod: PBBFAC,,, | Performed by: STUDENT IN AN ORGANIZED HEALTH CARE EDUCATION/TRAINING PROGRAM

## 2019-05-01 PROCEDURE — 99999 PR PBB SHADOW E&M-EST. PATIENT-LVL IV: CPT | Mod: PBBFAC,,, | Performed by: STUDENT IN AN ORGANIZED HEALTH CARE EDUCATION/TRAINING PROGRAM

## 2019-05-01 PROCEDURE — 99213 PR OFFICE/OUTPT VISIT, EST, LEVL III, 20-29 MIN: ICD-10-PCS | Mod: S$PBB,,, | Performed by: STUDENT IN AN ORGANIZED HEALTH CARE EDUCATION/TRAINING PROGRAM

## 2019-05-01 PROCEDURE — 99214 OFFICE O/P EST MOD 30 MIN: CPT | Mod: PBBFAC | Performed by: STUDENT IN AN ORGANIZED HEALTH CARE EDUCATION/TRAINING PROGRAM

## 2019-05-01 RX ORDER — LEVOCETIRIZINE DIHYDROCHLORIDE 5 MG/1
5 TABLET, FILM COATED ORAL NIGHTLY
Qty: 30 TABLET | Refills: 11 | Status: SHIPPED | OUTPATIENT
Start: 2019-05-01 | End: 2019-09-04

## 2019-05-01 RX ORDER — BENZONATATE 200 MG/1
200 CAPSULE ORAL 3 TIMES DAILY PRN
Qty: 30 CAPSULE | Refills: 1 | Status: SHIPPED | OUTPATIENT
Start: 2019-05-01 | End: 2019-05-11

## 2019-05-01 NOTE — PATIENT INSTRUCTIONS
1. Delsym OTC every 12 hours for the next 7 days in combination with the prescribed medication Xyzal   2. Tessalon Pearls as needed for further cough suppression  3. Ask pharmacist for ear wax kit, if this does not work then proceed with ENT visit

## 2019-05-01 NOTE — PROGRESS NOTES
Subjective:       Patient ID: Syed Lyon is a 33 y.o. female.    Chief Complaint: Sinus Problem    Patient is a 33 y.o. AAF who presents with one month duration of productive cough, runny nose, post nasal drip and sinus fullness. Patient reports worsening of symptoms over the past month. Patient denies fever, fatigue, chest pain or SOB. Patient reports the cough is productive of green-brown mucus plugs, reports the cough is worse when laying down at night. Ms. Lyon states that she has tried robitussin, mucinex and nyquill PRN without any relief. Patient denies having seasonal allergies, pain over sinus cavities, bleeding from the nasal cavities or oral cavities.       Review of Systems   Constitutional: Negative.    HENT: Positive for congestion, postnasal drip, sinus pressure and sneezing. Negative for ear discharge, ear pain, nosebleeds, sore throat, trouble swallowing and voice change.    Respiratory: Positive for cough.    Cardiovascular: Negative for chest pain and leg swelling.   Gastrointestinal: Negative for abdominal pain.   Genitourinary: Negative.    Musculoskeletal: Negative.    Skin: Negative.    Psychiatric/Behavioral: Negative.        Objective:      Physical Exam   Constitutional: She is oriented to person, place, and time. She appears well-developed and well-nourished. No distress.   HENT:   Head: Normocephalic and atraumatic.   Right Ear: External ear normal.   Mouth/Throat: No oropharyngeal exudate.   Left ear canal with cerumen impaction   Cobble stoning of throat   No LAD   Eyes: EOM are normal. No scleral icterus.   Neck: Normal range of motion. Neck supple. No JVD present.   Cardiovascular: Normal rate, regular rhythm, normal heart sounds and intact distal pulses. Exam reveals no friction rub.   No murmur heard.  Pulmonary/Chest: Effort normal and breath sounds normal. No respiratory distress. She has no wheezes.   Abdominal: Soft. Bowel sounds are normal. She exhibits no distension  and no mass. There is no tenderness.   Musculoskeletal: Normal range of motion. She exhibits no edema.   Neurological: She is alert and oriented to person, place, and time. No cranial nerve deficit. Coordination normal.   Skin: Skin is warm and dry. Capillary refill takes less than 2 seconds. She is not diaphoretic. No erythema.   Psychiatric: She has a normal mood and affect.   Nursing note and vitals reviewed.      Assessment:       1. Bronchitis    2. Cerumen debris on tympanic membrane of left ear        Plan:       Syed was seen today for sinus problem.    Diagnoses and all orders for this visit:    Bronchitis  -     levocetirizine (XYZAL) 5 MG tablet; Take 1 tablet (5 mg total) by mouth every evening.  -     benzonatate (TESSALON) 200 MG capsule; Take 1 capsule (200 mg total) by mouth 3 (three) times daily as needed for Cough.  - Delsym q12 scheduled for 7 days regardless of symptoms    Cerumen debris on tympanic membrane of left ear  -     Ambulatory Referral to ENT    Plan discussed with attending Dr. Josue, further recommendations as per attending addendum. Please feel free to call with any questions or concerns.        Debby Giordano MD  Internal Medicine  Resident Physician - PGY2

## 2019-05-09 ENCOUNTER — CLINICAL SUPPORT (OUTPATIENT)
Dept: AUDIOLOGY | Facility: CLINIC | Age: 34
End: 2019-05-09
Payer: MEDICAID

## 2019-05-09 DIAGNOSIS — H69.90 DYSFUNCTION OF EUSTACHIAN TUBE, UNSPECIFIED LATERALITY: Primary | ICD-10-CM

## 2019-05-09 PROCEDURE — 92552 PURE TONE AUDIOMETRY AIR: CPT | Mod: PBBFAC | Performed by: AUDIOLOGIST

## 2019-05-09 PROCEDURE — 92567 TYMPANOMETRY: CPT | Mod: PBBFAC | Performed by: AUDIOLOGIST

## 2019-05-09 PROCEDURE — 92556 SPEECH AUDIOMETRY COMPLETE: CPT | Mod: PBBFAC | Performed by: AUDIOLOGIST

## 2019-05-09 NOTE — PROGRESS NOTES
Ms. Syed Lyon was seen in the clinic today for an audiological evaluation.    Otoscopy revealed non-occluding cerumen for each ear, left worse than right.  Audiological testing revealed normal hearing sensitivity for each ear.  A speech reception threshold was obtained at 5 dBHL for each ear.  Speech discrimination was 100% for each ear.      Tympanometry testing revealed a Type A tympanogram for each ear.      Recommendations:  1. Otologic evaluation on June 11, 2019 with Dr. Tucker La  2. Audiological evaluation, as needed  3. Hearing protection when in noise

## 2019-05-09 NOTE — Clinical Note
Dr. Giordano,Please see the results of today's audiological evaluation.  Please let me know if I can provide any additional information.Thank you,Morena Rivera, CCC-A

## 2019-05-10 NOTE — PROGRESS NOTES
I have reviewed the notes, assessments, and/or procedures performed by Dr. Giordano, I concur with his documentation of Syed Lyon.

## 2019-06-11 ENCOUNTER — OFFICE VISIT (OUTPATIENT)
Dept: OTOLARYNGOLOGY | Facility: CLINIC | Age: 34
End: 2019-06-11
Payer: MEDICAID

## 2019-06-11 VITALS — HEIGHT: 66 IN | BODY MASS INDEX: 34.9 KG/M2 | WEIGHT: 217.13 LBS

## 2019-06-11 DIAGNOSIS — H61.22 IMPACTED CERUMEN OF LEFT EAR: Primary | ICD-10-CM

## 2019-06-11 PROCEDURE — 99999 PR PBB SHADOW E&M-EST. PATIENT-LVL III: ICD-10-PCS | Mod: PBBFAC,,, | Performed by: OTOLARYNGOLOGY

## 2019-06-11 PROCEDURE — 69210 EAR CERUMEN REMOVAL: ICD-10-PCS | Mod: S$PBB,,, | Performed by: OTOLARYNGOLOGY

## 2019-06-11 PROCEDURE — 99499 NO LOS: ICD-10-PCS | Mod: S$PBB,,, | Performed by: OTOLARYNGOLOGY

## 2019-06-11 PROCEDURE — 99213 OFFICE O/P EST LOW 20 MIN: CPT | Mod: PBBFAC | Performed by: OTOLARYNGOLOGY

## 2019-06-11 PROCEDURE — 69210 REMOVE IMPACTED EAR WAX UNI: CPT | Mod: PBBFAC | Performed by: OTOLARYNGOLOGY

## 2019-06-11 PROCEDURE — 99499 UNLISTED E&M SERVICE: CPT | Mod: S$PBB,,, | Performed by: OTOLARYNGOLOGY

## 2019-06-11 PROCEDURE — 99999 PR PBB SHADOW E&M-EST. PATIENT-LVL III: CPT | Mod: PBBFAC,,, | Performed by: OTOLARYNGOLOGY

## 2019-06-11 NOTE — PROCEDURES
Ear Cerumen Removal  Date/Time: 6/11/2019 2:45 PM  Performed by: Tucker La MD  Authorized by: Tucker La MD     Consent Done?:  Yes (Verbal)    Local anesthetic:  None  Location details:  Left ear  Procedure type: curette    Cerumen  Removal Results:  Cerumen completely removed  Patient tolerance:  Patient tolerated the procedure well with no immediate complications     Binocular microscopy used to examine ear canal and tympanic membrane.  There was excess cerumen on the right side and an impaction of cerumen on the left.  This was removed using a curette and other microinstrumentation.

## 2019-08-15 ENCOUNTER — PATIENT MESSAGE (OUTPATIENT)
Dept: OBSTETRICS AND GYNECOLOGY | Facility: CLINIC | Age: 34
End: 2019-08-15

## 2019-08-16 ENCOUNTER — PATIENT MESSAGE (OUTPATIENT)
Dept: OBSTETRICS AND GYNECOLOGY | Facility: CLINIC | Age: 34
End: 2019-08-16

## 2019-08-16 RX ORDER — FLUCONAZOLE 150 MG/1
150 TABLET ORAL DAILY
Qty: 2 TABLET | Refills: 0 | Status: SHIPPED | OUTPATIENT
Start: 2019-08-16 | End: 2019-08-18

## 2019-08-16 NOTE — TELEPHONE ENCOUNTER
Bone pt- would like an Rx sent to pharm for a yeast infection. C/o vaginal discharge, irritation, odor. Will come in if not better    Allergies and Pharm UTD  Diflucan pended

## 2019-09-03 ENCOUNTER — PATIENT MESSAGE (OUTPATIENT)
Dept: OBSTETRICS AND GYNECOLOGY | Facility: CLINIC | Age: 34
End: 2019-09-03

## 2019-09-04 ENCOUNTER — LAB VISIT (OUTPATIENT)
Dept: LAB | Facility: HOSPITAL | Age: 34
End: 2019-09-04
Attending: OBSTETRICS & GYNECOLOGY
Payer: MEDICAID

## 2019-09-04 ENCOUNTER — PATIENT OUTREACH (OUTPATIENT)
Dept: ADMINISTRATIVE | Facility: OTHER | Age: 34
End: 2019-09-04

## 2019-09-04 ENCOUNTER — OFFICE VISIT (OUTPATIENT)
Dept: OBSTETRICS AND GYNECOLOGY | Facility: CLINIC | Age: 34
End: 2019-09-04
Payer: MEDICAID

## 2019-09-04 VITALS
SYSTOLIC BLOOD PRESSURE: 118 MMHG | HEIGHT: 66 IN | BODY MASS INDEX: 31.67 KG/M2 | DIASTOLIC BLOOD PRESSURE: 72 MMHG | WEIGHT: 197.06 LBS

## 2019-09-04 DIAGNOSIS — B96.89 BV (BACTERIAL VAGINOSIS): ICD-10-CM

## 2019-09-04 DIAGNOSIS — Z11.3 SCREENING EXAMINATION FOR STD (SEXUALLY TRANSMITTED DISEASE): ICD-10-CM

## 2019-09-04 DIAGNOSIS — N76.0 BV (BACTERIAL VAGINOSIS): ICD-10-CM

## 2019-09-04 DIAGNOSIS — N89.8 VAGINAL DISCHARGE: Primary | ICD-10-CM

## 2019-09-04 LAB
BACTERIA HYPHAE, POC: NEGATIVE
GARDNERELLA VAGINALIS: NEGATIVE
OTHER MICROSC. OBSERVATIONS: ABNORMAL
POC BACTERIAL VAGINOSIS: POSITIVE
POC CLUE CELLS: POSITIVE
TRICHOMONAS, POC: NEGATIVE
YEAST WET PREP: NEGATIVE
YEAST, POC: NEGATIVE

## 2019-09-04 PROCEDURE — 99214 PR OFFICE/OUTPT VISIT, EST, LEVL IV, 30-39 MIN: ICD-10-PCS | Mod: S$PBB,,, | Performed by: NURSE PRACTITIONER

## 2019-09-04 PROCEDURE — 99214 OFFICE O/P EST MOD 30 MIN: CPT | Mod: S$PBB,,, | Performed by: NURSE PRACTITIONER

## 2019-09-04 PROCEDURE — 86592 SYPHILIS TEST NON-TREP QUAL: CPT

## 2019-09-04 PROCEDURE — 99213 OFFICE O/P EST LOW 20 MIN: CPT | Mod: PBBFAC,PN | Performed by: NURSE PRACTITIONER

## 2019-09-04 PROCEDURE — 87491 CHLMYD TRACH DNA AMP PROBE: CPT

## 2019-09-04 PROCEDURE — 86703 HIV-1/HIV-2 1 RESULT ANTBDY: CPT

## 2019-09-04 PROCEDURE — 99999 PR PBB SHADOW E&M-EST. PATIENT-LVL III: ICD-10-PCS | Mod: PBBFAC,,, | Performed by: NURSE PRACTITIONER

## 2019-09-04 PROCEDURE — 99999 PR PBB SHADOW E&M-EST. PATIENT-LVL III: CPT | Mod: PBBFAC,,, | Performed by: NURSE PRACTITIONER

## 2019-09-04 PROCEDURE — 87340 HEPATITIS B SURFACE AG IA: CPT

## 2019-09-04 RX ORDER — METRONIDAZOLE 65 MG/5G
1 GEL TOPICAL NIGHTLY
Qty: 5 G | Refills: 0 | Status: SHIPPED | OUTPATIENT
Start: 2019-09-04 | End: 2019-09-05

## 2019-09-04 NOTE — PROGRESS NOTES
Chief Complaint:    Chief Complaint   Patient presents with    Vaginitis     vaginal Odor/ vaginal discharge/ pt wants std testing         (Dr. Winters patient)    Last Pap:   2017      HPI:     Syed Lyon is a 33 y.o. female  presents with complaint of vaginal odor, discharge, and irritation.  She was called in a prescription for Diflucan approximately 2 weeks ago and states that her symptoms improved a little bit.  Recently when on a menstrual cycle, patient accidentally picked up scented panty liners through store, so she is not sure if this had any effect on her vaginal infection.       No new sexual partners.  She would like STD screening today for GC/CT/Trich, HIV, RPR, Hep B.    LMP:  Patient's last menstrual period was 2019 (approximate).    Past Medical History:   Diagnosis Date    Abnormal Pap smear of cervix     cryo yrs. ago, then 3- hpv positive, pap normal, 2017 colpo ECC normal,     Endometriosis     per dx lap Bad endo per pt    HPV (human papilloma virus) infection     PID (pelvic inflammatory disease)        Past Surgical History:   Procedure Laterality Date     SECTION      COLONOSCOPY N/A 2017    [facility]formed by [facility] at [facility] (4TH FLR)    DELIVERY- SECTION N/A 2016    Performed by Palomo [facility]ters MD at [facility] L&D    ESOPHAGOGASTRODUODENOSCOPY (EGD) N/A 2017    Performed by [facility]idi MD at [facility] ENDO (4TH FLR)    fallopian tube removal Bilateral     GYNECOLOGIC CRYOSURGERY      HYSTEROSCOPY      2015    HYSTEROSCOPY N/A 10/1/2015    Performed by [facility] Santana MD at [facility] OR    HYSTEROSCOPY N/A 2015    Performed by [facility] at [facility] OR    In Vitro      Dr Anuja Santana    POLYPECTOMY N/A 10/1/2015    Performed by [facility] at [facility] OR    [id number] N/A 2015    Performed by Isabela Santana MD at [facility] OR       OB History    Para Term  " AB Living   1 1   1   1   SAB TAB Ectopic Multiple Live Births         0 1      # Outcome Date GA Lbr Dustin/2nd Weight Sex Delivery Anes PTL Lv   1  16 35w5d  2.608 kg (5 lb 12 oz) F CS-LTranv Spinal N OCTAVIANO       ROS:     GENERAL:  No fever, chills, fatigability or weight loss.  REPRODUCTIVE:  See HPI.  ABDOMEN:  No abdominal pain. Denies nausea. Denies vomiting. No diarrhea. No constipation.  URINARY:  No incontinence, no nocturia, no frequency and no dysuria.  CARDIOVASCULAR:  No chest pain. No shortness of breath. No leg cramps.  NEUROLOGICAL:  No headaches. No vision changes.    Physical Exam:     Vitals:    19 1104   BP: 118/72   Weight: 89.4 kg (197 lb 1.5 oz)   Height: 5' 6" (1.676 m)   PainSc: 0-No pain     Body mass index is 31.81 kg/m².    GENERAL: No acute distress, alert and engaged  VULVA: normal appearing vulva with no masses, tenderness or lesions.   VAGINA: normal appearing vagina with normal color and no lesions; mod amount pale yellow homogenous, maldodorous discharge - KOH/WetPrep collected.   CERVIX: normal appearing cervix without discharge or lesions; no CMT.   UTERUS: uterus is normal size, shape, consistency and non-tender; mobile.   ADNEXA: normal adnexa in size, non-tender and no masses.    Results:      KOH/Wet Prep results:  BV:   POS,  Candida:  neg,  Trichomonas:   neg       (See full results under LABS in Epic)    Assessment/Plan:     Vaginal discharge  -     Cancel: POCT KOH  -     Cancel: POCT WET PREP  -     POCT KOH  -     POCT WET PREP    Screening examination for STD (sexually transmitted disease)  -     Hepatitis B surface antigen; Future; Expected date: 2019  -     HIV 1/2 Ag/Ab (4th Gen); Future; Expected date: 2019  -     RPR; Future; Expected date: 2019  -     C. trachomatis/N. gonorrhoeae by AMP DNA    BV (bacterial vaginosis)  -     NUVESSA 1.3 % Gel; Place 1 Applicatorful vaginally nightly. (Shown to treat BV by 7 days after " treatment) for 1 dose  Dispense: 5 g; Refill: 0      Counseling:     * Use of the Transmit Patient Portal discussed and encouraged during today's visit.     * Patient aware she will be notified of any results from today's visit once they have been reviewed by Provider, either via her [facility] patient portal, or telephone call.    Follow-up:  Follow up if symptoms worsen or fail to improve..

## 2019-09-05 LAB
C TRACH DNA SPEC QL NAA+PROBE: NOT DETECTED
HBV SURFACE AG SERPL QL IA: NEGATIVE
HIV 1+2 AB+HIV1 P24 AG SERPL QL IA: NEGATIVE
N GONORRHOEA DNA SPEC QL NAA+PROBE: NOT DETECTED
RPR SER QL: NORMAL

## 2019-09-09 ENCOUNTER — PATIENT MESSAGE (OUTPATIENT)
Dept: OBSTETRICS AND GYNECOLOGY | Facility: CLINIC | Age: 34
End: 2019-09-09

## 2019-09-09 RX ORDER — METRONIDAZOLE 65 MG/5G
GEL TOPICAL
Refills: 0 | COMMUNITY
Start: 2019-09-05 | End: 2019-09-10 | Stop reason: SDUPTHER

## 2019-09-09 NOTE — TELEPHONE ENCOUNTER
Good morning Mrs. Rodríguez     I am said to say that I had a little marion with my  prescription. I'll save you the details and just say it went else where. I would like to know if I could have another shot at it and get another prescription. Lol thank you     Syed Lyon 1 minute ago (1:56 PM)         No problem I was getting the tube together before inserting and I must've pushed it a little to hard and it shot out on to my shoulder and shirt I tried to save as much as I could but I don't believe it was enough couldn't get it all back into the tube. Sorry        I've pended the Nuvessa again in case that's what you want to send in

## 2019-09-10 RX ORDER — METRONIDAZOLE 65 MG/5G
GEL TOPICAL
Qty: 5 G | Refills: 0 | Status: SHIPPED | OUTPATIENT
Start: 2019-09-10 | End: 2019-09-25

## 2019-09-10 NOTE — TELEPHONE ENCOUNTER
Patient is requesting a new prescription for nuvessa because she used the cream wrong previously. Pharmacy updated in pts chart. AMI

## 2019-09-23 ENCOUNTER — PATIENT MESSAGE (OUTPATIENT)
Dept: OBSTETRICS AND GYNECOLOGY | Facility: CLINIC | Age: 34
End: 2019-09-23

## 2019-09-24 ENCOUNTER — PATIENT OUTREACH (OUTPATIENT)
Dept: ADMINISTRATIVE | Facility: OTHER | Age: 34
End: 2019-09-24

## 2019-09-25 ENCOUNTER — OFFICE VISIT (OUTPATIENT)
Dept: OBSTETRICS AND GYNECOLOGY | Facility: CLINIC | Age: 34
End: 2019-09-25
Attending: OBSTETRICS & GYNECOLOGY
Payer: MEDICAID

## 2019-09-25 VITALS — HEIGHT: 66 IN | BODY MASS INDEX: 31.18 KG/M2 | WEIGHT: 194 LBS

## 2019-09-25 DIAGNOSIS — N89.8 VAGINAL DISCHARGE: Primary | ICD-10-CM

## 2019-09-25 DIAGNOSIS — R87.810 CERVICAL HIGH RISK HPV (HUMAN PAPILLOMAVIRUS) TEST POSITIVE: ICD-10-CM

## 2019-09-25 DIAGNOSIS — R21 RASH: ICD-10-CM

## 2019-09-25 DIAGNOSIS — Z11.3 SCREENING EXAMINATION FOR STD (SEXUALLY TRANSMITTED DISEASE): ICD-10-CM

## 2019-09-25 PROCEDURE — 87481 CANDIDA DNA AMP PROBE: CPT | Mod: 59

## 2019-09-25 PROCEDURE — 99213 OFFICE O/P EST LOW 20 MIN: CPT | Mod: S$PBB,,, | Performed by: OBSTETRICS & GYNECOLOGY

## 2019-09-25 PROCEDURE — 88175 CYTOPATH C/V AUTO FLUID REDO: CPT

## 2019-09-25 PROCEDURE — 99213 OFFICE O/P EST LOW 20 MIN: CPT | Mod: PBBFAC | Performed by: OBSTETRICS & GYNECOLOGY

## 2019-09-25 PROCEDURE — 87801 DETECT AGNT MULT DNA AMPLI: CPT

## 2019-09-25 PROCEDURE — 99213 PR OFFICE/OUTPT VISIT, EST, LEVL III, 20-29 MIN: ICD-10-PCS | Mod: S$PBB,,, | Performed by: OBSTETRICS & GYNECOLOGY

## 2019-09-25 PROCEDURE — 99999 PR PBB SHADOW E&M-EST. PATIENT-LVL III: ICD-10-PCS | Mod: PBBFAC,,, | Performed by: OBSTETRICS & GYNECOLOGY

## 2019-09-25 PROCEDURE — 87491 CHLMYD TRACH DNA AMP PROBE: CPT

## 2019-09-25 PROCEDURE — 99999 PR PBB SHADOW E&M-EST. PATIENT-LVL III: CPT | Mod: PBBFAC,,, | Performed by: OBSTETRICS & GYNECOLOGY

## 2019-09-25 PROCEDURE — 87624 HPV HI-RISK TYP POOLED RSLT: CPT

## 2019-09-25 PROCEDURE — 87661 TRICHOMONAS VAGINALIS AMPLIF: CPT

## 2019-09-25 RX ORDER — TRIAMCINOLONE ACETONIDE 1 MG/G
OINTMENT TOPICAL
Qty: 30 G | Refills: 0 | Status: SHIPPED | OUTPATIENT
Start: 2019-09-25 | End: 2020-10-09

## 2019-09-25 RX ORDER — METRONIDAZOLE 500 MG/1
500 TABLET ORAL 2 TIMES DAILY
Qty: 14 TABLET | Refills: 0 | Status: SHIPPED | OUTPATIENT
Start: 2019-09-25 | End: 2019-10-02

## 2019-09-25 NOTE — PROGRESS NOTES
Subjective:       Patient ID: Syed Lyon is a 33 y.o. female.    Chief Complaint:  Rash (C/o rash (not itchy) since after cycle. Had vaginal odor & was given Nuvessa for +BV by Anne. )      History of Present Illness  32 y/o here for follow-up   Patient was given Nuvessa for BV.  Patient has not noticed any improvement in discharge and now is noting a rash in the creases where her leg meets her vulva.  She denies any blisters or sores.  Feels that her vaginal discharge has not improved since using the medication topically.    GYN & OB History  Patient's last menstrual period was 2019.   Date of Last Pap: 2017    OB History    Para Term  AB Living   1 1   1   1   SAB TAB Ectopic Multiple Live Births         0 1      # Outcome Date GA Lbr Dustin/2nd Weight Sex Delivery Anes PTL Lv   1  16 35w5d  2.608 kg (5 lb 12 oz) F CS-LTranv Spinal N OCTAVIANO       Review of Systems  Review of Systems     Objective:     There were no vitals filed for this visit.    Physical Exam:   Constitutional: She is oriented to person, place, and time. She appears well-developed and well-nourished.    HENT:   Head: Normocephalic.       Pulmonary/Chest: Effort normal.          Genitourinary: Uterus normal.       Pelvic exam was performed with patient supine. There is no rash or lesion on the right labia. There is no rash or lesion on the left labia. Cervix is normal. Right adnexum displays no mass and no tenderness. Left adnexum displays no mass and no tenderness. No tenderness in the vagina. Vaginal discharge found. Cervix exhibits no motion tenderness, no discharge and no friability.   Genitourinary Comments: Copious amounts of yellow green discharge noted in the vaginal vault.  GC and chlamydia cultures obtained an affirm performed.    Red marker noted at the area of itching no rash seen and no lesions seen           Musculoskeletal: Normal range of motion.       Neurological: She is alert and oriented  to person, place, and time.    Skin: Skin is warm and dry.    Psychiatric: She has a normal mood and affect. Her behavior is normal.          Assessment/ Plan:     Orders Placed This Encounter    HPV High Risk Genotypes, PCR    C. trachomatis/N. gonorrhoeae by AMP DNA    Vaginosis Screen by DNA Probe    Liquid-based pap smear, screening    metroNIDAZOLE (FLAGYL) 500 MG tablet    triamcinolone acetonide 0.1% (KENALOG) 0.1 % ointment       Syed was seen today for rash.    Diagnoses and all orders for this visit:    Vaginal discharge  -     metroNIDAZOLE (FLAGYL) 500 MG tablet; Take 1 tablet (500 mg total) by mouth 2 (two) times daily. for 7 days  -     Vaginosis Screen by DNA Probe    Rash  -     triamcinolone acetonide 0.1% (KENALOG) 0.1 % ointment; Apply to affected area two to three times daily    Screening examination for STD (sexually transmitted disease)  -     C. trachomatis/N. gonorrhoeae by AMP DNA    Cervical high risk HPV (human papillomavirus) test positive  -     HPV High Risk Genotypes, PCR  -     Liquid-based pap smear, screening        Follow up if symptoms worsen or fail to improve.      Health Maintenance       Date Due Completion Date    TETANUS VACCINE 11/07/2004 ---    Pap Smear with HPV Cotest 11/28/2020 11/28/2017

## 2019-09-26 LAB
BACTERIAL VAGINOSIS DNA: POSITIVE
C TRACH DNA SPEC QL NAA+PROBE: NOT DETECTED
CANDIDA GLABRATA DNA: NEGATIVE
CANDIDA KRUSEI DNA: NEGATIVE
CANDIDA RRNA VAG QL PROBE: NEGATIVE
N GONORRHOEA DNA SPEC QL NAA+PROBE: NOT DETECTED
T VAGINALIS RRNA GENITAL QL PROBE: POSITIVE

## 2019-09-27 NOTE — PROGRESS NOTES
Please call has trich and BV  Needs Flagyl 500mg BID for 7 days and so does her partner for the trich or the dc will not go away  He needs to call PCP for RX for Flagyl for Trichomonas  But if bith tx it will go away   No sex til both tx   No ETOH

## 2019-10-01 LAB
HPV HR 12 DNA CVX QL NAA+PROBE: NEGATIVE
HPV16 AG SPEC QL: NEGATIVE
HPV18 DNA SPEC QL NAA+PROBE: NEGATIVE

## 2019-10-03 NOTE — PROGRESS NOTES
Patient already treated for bacterial vaginosis and Trichomonas.  Patient aware and is currently on Flagyl

## 2019-11-05 ENCOUNTER — PATIENT MESSAGE (OUTPATIENT)
Dept: OBSTETRICS AND GYNECOLOGY | Facility: CLINIC | Age: 34
End: 2019-11-05

## 2019-11-05 RX ORDER — NORGESTIMATE AND ETHINYL ESTRADIOL 0.25-0.035
1 KIT ORAL DAILY
Qty: 28 TABLET | Refills: 11 | Status: SHIPPED | OUTPATIENT
Start: 2019-11-05 | End: 2020-10-09 | Stop reason: SDUPTHER

## 2019-11-05 NOTE — TELEPHONE ENCOUNTER
, which ocp would you like for Ganea she has had BTL but wants ocp's to help with her endo mostly.

## 2019-11-21 ENCOUNTER — PATIENT MESSAGE (OUTPATIENT)
Dept: OBSTETRICS AND GYNECOLOGY | Facility: CLINIC | Age: 34
End: 2019-11-21

## 2019-12-03 ENCOUNTER — OFFICE VISIT (OUTPATIENT)
Dept: INTERNAL MEDICINE | Facility: CLINIC | Age: 34
End: 2019-12-03
Payer: MEDICAID

## 2019-12-03 VITALS
SYSTOLIC BLOOD PRESSURE: 110 MMHG | OXYGEN SATURATION: 95 % | DIASTOLIC BLOOD PRESSURE: 64 MMHG | HEIGHT: 66 IN | WEIGHT: 204.81 LBS | BODY MASS INDEX: 32.92 KG/M2 | HEART RATE: 90 BPM

## 2019-12-03 DIAGNOSIS — D64.9 ANEMIA, UNSPECIFIED TYPE: Primary | ICD-10-CM

## 2019-12-03 PROCEDURE — 99214 PR OFFICE/OUTPT VISIT, EST, LEVL IV, 30-39 MIN: ICD-10-PCS | Mod: S$PBB,,, | Performed by: INTERNAL MEDICINE

## 2019-12-03 PROCEDURE — 99999 PR PBB SHADOW E&M-EST. PATIENT-LVL III: ICD-10-PCS | Mod: PBBFAC,,, | Performed by: INTERNAL MEDICINE

## 2019-12-03 PROCEDURE — 99214 OFFICE O/P EST MOD 30 MIN: CPT | Mod: S$PBB,,, | Performed by: INTERNAL MEDICINE

## 2019-12-03 PROCEDURE — 99213 OFFICE O/P EST LOW 20 MIN: CPT | Mod: PBBFAC | Performed by: INTERNAL MEDICINE

## 2019-12-03 PROCEDURE — 99999 PR PBB SHADOW E&M-EST. PATIENT-LVL III: CPT | Mod: PBBFAC,,, | Performed by: INTERNAL MEDICINE

## 2019-12-03 RX ORDER — AMOXICILLIN 875 MG/1
875 TABLET, FILM COATED ORAL 2 TIMES DAILY
Qty: 14 TABLET | Refills: 0 | Status: SHIPPED | OUTPATIENT
Start: 2019-12-03 | End: 2019-12-10

## 2019-12-04 ENCOUNTER — LAB VISIT (OUTPATIENT)
Dept: LAB | Facility: HOSPITAL | Age: 34
End: 2019-12-04
Attending: INTERNAL MEDICINE
Payer: MEDICAID

## 2019-12-04 DIAGNOSIS — D64.9 ANEMIA, UNSPECIFIED TYPE: ICD-10-CM

## 2019-12-04 LAB
ALBUMIN SERPL BCP-MCNC: 3.7 G/DL (ref 3.5–5.2)
ALP SERPL-CCNC: 46 U/L (ref 55–135)
ALT SERPL W/O P-5'-P-CCNC: 7 U/L (ref 10–44)
ANION GAP SERPL CALC-SCNC: 7 MMOL/L (ref 8–16)
ANISOCYTOSIS BLD QL SMEAR: SLIGHT
AST SERPL-CCNC: 10 U/L (ref 10–40)
BASOPHILS # BLD AUTO: 0.03 K/UL (ref 0–0.2)
BASOPHILS NFR BLD: 0.5 % (ref 0–1.9)
BILIRUB SERPL-MCNC: 0.7 MG/DL (ref 0.1–1)
BUN SERPL-MCNC: 17 MG/DL (ref 6–20)
CALCIUM SERPL-MCNC: 9 MG/DL (ref 8.7–10.5)
CHLORIDE SERPL-SCNC: 104 MMOL/L (ref 95–110)
CHOLEST SERPL-MCNC: 158 MG/DL (ref 120–199)
CHOLEST/HDLC SERPL: 2.7 {RATIO} (ref 2–5)
CO2 SERPL-SCNC: 27 MMOL/L (ref 23–29)
CREAT SERPL-MCNC: 0.8 MG/DL (ref 0.5–1.4)
DIFFERENTIAL METHOD: ABNORMAL
EOSINOPHIL # BLD AUTO: 0.2 K/UL (ref 0–0.5)
EOSINOPHIL NFR BLD: 3.9 % (ref 0–8)
ERYTHROCYTE [DISTWIDTH] IN BLOOD BY AUTOMATED COUNT: 15 % (ref 11.5–14.5)
EST. GFR  (AFRICAN AMERICAN): >60 ML/MIN/1.73 M^2
EST. GFR  (NON AFRICAN AMERICAN): >60 ML/MIN/1.73 M^2
GLUCOSE SERPL-MCNC: 87 MG/DL (ref 70–110)
HCT VFR BLD AUTO: 35.2 % (ref 37–48.5)
HDLC SERPL-MCNC: 59 MG/DL (ref 40–75)
HDLC SERPL: 37.3 % (ref 20–50)
HGB BLD-MCNC: 11.2 G/DL (ref 12–16)
LDLC SERPL CALC-MCNC: 86.6 MG/DL (ref 63–159)
LYMPHOCYTES # BLD AUTO: 1.3 K/UL (ref 1–4.8)
LYMPHOCYTES NFR BLD: 22.1 % (ref 18–48)
MCH RBC QN AUTO: 22.6 PG (ref 27–31)
MCHC RBC AUTO-ENTMCNC: 31.8 G/DL (ref 32–36)
MCV RBC AUTO: 71 FL (ref 82–98)
MONOCYTES # BLD AUTO: 0.6 K/UL (ref 0.3–1)
MONOCYTES NFR BLD: 10.7 % (ref 4–15)
NEUTROPHILS # BLD AUTO: 3.6 K/UL (ref 1.8–7.7)
NEUTROPHILS NFR BLD: 62.8 % (ref 38–73)
NONHDLC SERPL-MCNC: 99 MG/DL
PLATELET # BLD AUTO: 220 K/UL (ref 150–350)
PLATELET BLD QL SMEAR: ABNORMAL
PMV BLD AUTO: 11.3 FL (ref 9.2–12.9)
POIKILOCYTOSIS BLD QL SMEAR: SLIGHT
POLYCHROMASIA BLD QL SMEAR: ABNORMAL
POTASSIUM SERPL-SCNC: 4 MMOL/L (ref 3.5–5.1)
PROT SERPL-MCNC: 7.7 G/DL (ref 6–8.4)
RBC # BLD AUTO: 4.95 M/UL (ref 4–5.4)
SODIUM SERPL-SCNC: 138 MMOL/L (ref 136–145)
TRIGL SERPL-MCNC: 62 MG/DL (ref 30–150)
WBC # BLD AUTO: 5.69 K/UL (ref 3.9–12.7)

## 2019-12-04 PROCEDURE — 80061 LIPID PANEL: CPT

## 2019-12-04 PROCEDURE — 85025 COMPLETE CBC W/AUTO DIFF WBC: CPT

## 2019-12-04 PROCEDURE — 36415 COLL VENOUS BLD VENIPUNCTURE: CPT

## 2019-12-04 PROCEDURE — 80053 COMPREHEN METABOLIC PANEL: CPT

## 2019-12-08 NOTE — PROGRESS NOTES
Subjective:       Patient ID: Syed Lyon is a 34 y.o. female.    Chief Complaint: Anemia    HPI  She returns for follow-up of anemia.  She has had anemia for over a year.  Current treatment has included medications outlined medication list.  She denies blood in stool.  Denies pain located in her chest    Past medical history:  Iron deficiency anemia    Medications:  Birth control pills    No known drug allergies      Review of Systems   Constitutional: Negative for chills, fatigue, fever and unexpected weight change.   Respiratory: Negative for chest tightness and shortness of breath.    Cardiovascular: Negative for chest pain and palpitations.   Gastrointestinal: Negative for abdominal pain and blood in stool.   Neurological: Negative for dizziness, syncope, numbness and headaches.       Objective:      Physical Exam   HENT:   Right Ear: External ear normal.   Left Ear: External ear normal.   Nose: Nose normal.   Mouth/Throat: Oropharynx is clear and moist.   Eyes: Pupils are equal, round, and reactive to light.   Neck: Normal range of motion.   Cardiovascular: Normal rate and regular rhythm.   No murmur heard.  Pulmonary/Chest: Breath sounds normal.   Abdominal: She exhibits no distension. There is no hepatosplenomegaly. There is no tenderness.   Lymphadenopathy:     She has no cervical adenopathy.     She has no axillary adenopathy.   Neurological: She has normal strength and normal reflexes. No cranial nerve deficit or sensory deficit.       Assessment/Plan       Assessment and plan:  Anemia:  Check CMP, lipid panel, CBC

## 2020-02-23 ENCOUNTER — HOSPITAL ENCOUNTER (EMERGENCY)
Facility: HOSPITAL | Age: 35
Discharge: HOME | End: 2020-02-23
Attending: EMERGENCY MEDICINE
Payer: MEDICAID

## 2020-02-23 VITALS
BODY MASS INDEX: 31.82 KG/M2 | WEIGHT: 198 LBS | OXYGEN SATURATION: 99 % | TEMPERATURE: 99 F | RESPIRATION RATE: 18 BRPM | DIASTOLIC BLOOD PRESSURE: 70 MMHG | HEIGHT: 66 IN | HEART RATE: 90 BPM | SYSTOLIC BLOOD PRESSURE: 120 MMHG

## 2020-02-23 DIAGNOSIS — B97.89 STOMATITIS, VIRAL: Primary | ICD-10-CM

## 2020-02-23 DIAGNOSIS — K12.1 STOMATITIS, VIRAL: Primary | ICD-10-CM

## 2020-02-23 LAB
B-HCG UR QL: NEGATIVE
CTP QC/QA: YES

## 2020-02-23 PROCEDURE — 81025 URINE PREGNANCY TEST: CPT | Performed by: EMERGENCY MEDICINE

## 2020-02-23 PROCEDURE — 99283 EMERGENCY DEPT VISIT LOW MDM: CPT

## 2020-02-23 PROCEDURE — 99284 EMERGENCY DEPT VISIT MOD MDM: CPT | Mod: ,,, | Performed by: EMERGENCY MEDICINE

## 2020-02-23 PROCEDURE — 99284 PR EMERGENCY DEPT VISIT,LEVEL IV: ICD-10-PCS | Mod: ,,, | Performed by: EMERGENCY MEDICINE

## 2020-02-23 RX ORDER — VALACYCLOVIR HYDROCHLORIDE 1 G/1
1000 TABLET, FILM COATED ORAL 3 TIMES DAILY
Qty: 21 TABLET | Refills: 0 | Status: SHIPPED | OUTPATIENT
Start: 2020-02-23 | End: 2020-10-09

## 2020-02-23 NOTE — ED TRIAGE NOTES
"Syed Lyon, an 34 y.o. female presents to the ED with swollen bottom lip.  Patient put vaseline on her lips before going to bed.  Denies eating any new foods, changing soaps, detergents etc.  Patient reports sinus congestion and post nasal drip for "months."        Review of patient's allergies indicates:  No Known Allergies  Chief Complaint   Patient presents with    Swollen Lip     swelling began this morning, denies trouble breathing and swallowing, current sinus infection      Past Medical History:   Diagnosis Date    Abnormal Pap smear of cervix     cryo yrs. ago, then 3-2017 hpv positive, pap normal, 2017 colpo ECC normal,     Endometriosis     per dx lap Bad endo per pt    HPV (human papilloma virus) infection     PID (pelvic inflammatory disease)        "

## 2020-02-23 NOTE — ED PROVIDER NOTES
Encounter Date: 2020       History     Chief Complaint   Patient presents with    Swollen Lip     swelling began this morning, denies trouble breathing and swallowing, current sinus infection      34-year-old female presents with 6 hr of swelling to her lower lip.  It is painful with excoriations.  She noticed swelling to her right lymph node.  There is no pain on swallowing.  No swelling to the tongue or mouth.  She denies taking any new medications.  She only takes birth control pills.  There is no shortness of breath or pulmonary symptoms.        Review of patient's allergies indicates:  No Known Allergies  Past Medical History:   Diagnosis Date    Abnormal Pap smear of cervix     cryo yrs. ago, then 3- hpv positive, pap normal,  colpo ECC normal,     Endometriosis     per dx lap Bad endo per pt    HPV (human papilloma virus) infection     PID (pelvic inflammatory disease)      Past Surgical History:   Procedure Laterality Date     SECTION      COLONOSCOPY N/A 2017    Procedure: COLONOSCOPY;  Surgeon: Jose Zaidi MD;  Location: [facility] ([address] [place] [address]);  Service: Endoscopy;  Laterality: N/A;    fallopian tube removal Bilateral     GYNECOLOGIC CRYOSURGERY      HYSTEROSCOPY      2015    In Vitro      Dr Anuja Santana     Family History   Problem Relation Age of Onset    Heart disease Father     No Known Problems Mother     No Known Problems Daughter     Meningitis Brother     Breast cancer Neg Hx     Colon cancer Neg Hx     Ovarian cancer Neg Hx     Cancer Neg Hx     Colon polyps Neg Hx     Celiac disease Neg Hx     Esophageal cancer Neg Hx     Stomach cancer Neg Hx     Irritable bowel syndrome Neg Hx     Inflammatory bowel disease Neg Hx      Social History     Tobacco Use    Smoking status: Never Smoker    Smokeless tobacco: Never Used   Substance Use Topics    Alcohol use: Yes     Frequency: Monthly or less     Drinks per session: 1 or 2     Binge  frequency: Never     Comment: occasional    Drug use: No     Review of Systems   Constitutional: Negative for chills and fever.   HENT: Positive for congestion. Negative for drooling, trouble swallowing and voice change.    Respiratory: Negative for shortness of breath.        Physical Exam     Initial Vitals [02/23/20 1306]   BP Pulse Resp Temp SpO2   120/70 90 18 98.6 °F (37 °C) 99 %      MAP       --         Physical Exam    Constitutional: She appears well-developed and well-nourished. She is not diaphoretic. No distress.   HENT:   Head: Normocephalic and atraumatic.   Right Ear: External ear normal.   Left Ear: External ear normal.   Nose: Nose normal.   Mouth/Throat: Oropharynx is clear and moist.   Source to the lower lip with swelling bilaterally.   Eyes: Conjunctivae and EOM are normal. Pupils are equal, round, and reactive to light.   Neck: Normal range of motion. Neck supple. No thyromegaly present. No tracheal deviation present. No JVD present.   Cardiovascular: Normal rate, regular rhythm, normal heart sounds and intact distal pulses.   No murmur heard.  Pulmonary/Chest: Breath sounds normal. No stridor. No respiratory distress. She has no wheezes. She has no rales.   Lymphadenopathy:     She has cervical adenopathy (Right-sided adenopathy).         ED Course   Procedures  Labs Reviewed   POCT URINE PREGNANCY          Imaging Results    None          Medical Decision Making:   Initial Assessment:   Patient with stomatitis.  I suspect this is viral with her right sided adenopathy.  Will initiate Valtrex.  I did out think this is angioedema.  I doubt this is a drug reaction shin she has only been on birth control.                                 Clinical Impression:       ICD-10-CM ICD-9-CM   1. Stomatitis, viral K12.1 528.00    B97.89 079.99            Discharge home on Valtrex                 Jean-Paul Burch MD  02/23/20 8597

## 2020-03-31 ENCOUNTER — PATIENT MESSAGE (OUTPATIENT)
Dept: OBSTETRICS AND GYNECOLOGY | Facility: CLINIC | Age: 35
End: 2020-03-31

## 2020-07-02 ENCOUNTER — HOSPITAL ENCOUNTER (EMERGENCY)
Facility: HOSPITAL | Age: 35
Discharge: HOME | End: 2020-07-02
Attending: EMERGENCY MEDICINE
Payer: MEDICAID

## 2020-07-02 VITALS
RESPIRATION RATE: 18 BRPM | TEMPERATURE: 98 F | HEART RATE: 83 BPM | OXYGEN SATURATION: 97 % | DIASTOLIC BLOOD PRESSURE: 71 MMHG | SYSTOLIC BLOOD PRESSURE: 117 MMHG

## 2020-07-02 DIAGNOSIS — L50.9 URTICARIA: Primary | ICD-10-CM

## 2020-07-02 PROCEDURE — 99284 PR EMERGENCY DEPT VISIT,LEVEL IV: ICD-10-PCS | Mod: ,,, | Performed by: EMERGENCY MEDICINE

## 2020-07-02 PROCEDURE — 63600175 PHARM REV CODE 636 W HCPCS: Performed by: EMERGENCY MEDICINE

## 2020-07-02 PROCEDURE — 25000003 PHARM REV CODE 250: Performed by: EMERGENCY MEDICINE

## 2020-07-02 PROCEDURE — 99284 EMERGENCY DEPT VISIT MOD MDM: CPT | Mod: ,,, | Performed by: EMERGENCY MEDICINE

## 2020-07-02 PROCEDURE — 99283 EMERGENCY DEPT VISIT LOW MDM: CPT

## 2020-07-02 RX ORDER — PREDNISONE 20 MG/1
60 TABLET ORAL
Status: COMPLETED | OUTPATIENT
Start: 2020-07-02 | End: 2020-07-02

## 2020-07-02 RX ORDER — PREDNISONE 20 MG/1
40 TABLET ORAL DAILY
Qty: 10 TABLET | Refills: 0 | Status: SHIPPED | OUTPATIENT
Start: 2020-07-03 | End: 2020-07-08

## 2020-07-02 RX ORDER — DIPHENHYDRAMINE HCL 50 MG
50 CAPSULE ORAL
Status: COMPLETED | OUTPATIENT
Start: 2020-07-02 | End: 2020-07-02

## 2020-07-02 RX ADMIN — PREDNISONE 60 MG: 20 TABLET ORAL at 09:07

## 2020-07-02 RX ADMIN — DIPHENHYDRAMINE HYDROCHLORIDE 50 MG: 50 CAPSULE ORAL at 09:07

## 2020-07-03 NOTE — ED TRIAGE NOTES
Syed Lyon, stefan 34 y.o. female presents to the ED w/ complaint of hives    Triage note:  Chief Complaint   Patient presents with    Urticaria     pt reports hives all over body for a couple of days. Pt states it's very itchy. Denies SOB, chest tighness, or throat tightness.      Review of patient's allergies indicates:  No Known Allergies  Past Medical History:   Diagnosis Date    Abnormal Pap smear of cervix     cryo yrs. ago, then 3-2017 hpv positive, pap normal, 2017 colpo ECC normal,     Endometriosis     per dx lap Bad endo per pt    HPV (human papilloma virus) infection     PID (pelvic inflammatory disease)        Adult Physical Assessment  LOC: Syed Lyon, 34 y.o. female verified via two identifiers.  The patient is awake, alert, oriented and speaking appropriately at this time.  APPEARANCE: Patient resting comfortably and appears to be in no acute distress at this time. Patient is clean and well groomed, patient's clothing is properly fastened.  SKIN:The skin is warm and dry, color consistent with ethnicity, patient has normal skin turgor and moist mucus membranes, skin intact, no breakdown or brusing noted.  MUSCULOSKELETAL: Patient moving all extremities well, no obvious swelling or deformities noted.  RESPIRATORY: Airway is open and patent, respirations are spontaneous, patient has a normal effort and rate, no accessory muscle use noted.  CARDIAC: Patient has a normal rate and rhythm, no periphreal edema noted in any extremity, capillary refill < 3 seconds in all extremities  ABDOMEN: Soft and non tender to palpation, no abdominal distention noted. Bowel sounds present in all four quadrants.  NEUROLOGIC: Eyes open spontaneously, behavior appropriate to situation, follows commands, facial expression symmetrical, bilateral hand grasp equal and even, purposeful motor response noted, normal sensation in all extremities when touched with a finger.        Pt denies changing laundry detergent and  soaps. Also denies taking any new medications or supplements.

## 2020-07-03 NOTE — DISCHARGE INSTRUCTIONS
Take Claritin or other non sedating antihistamine in the morning.  You may use one over-the-counter Benadryl at night.  Generic substitutions are ok for both.

## 2020-07-03 NOTE — ED PROVIDER NOTES
SCRIBE #1 NOTE: I, Ira Angel, am scribing for, and in the presence of,  Dr. Wang. I have scribed the entire note.       Source of History:  patient    Chief complaint:  Urticaria (pt reports hives all over body for a couple of days. Pt states it's very itchy. Denies SOB, chest tighness, or throat tightness. )      HPI:  Syed Lyon is a 34 y.o. female presenting with urticaria. Patient reports that she began having rashes about 3 days ago. Reports using Benadryl and hydrocortisone cream on the rashes, and states that they go away and then come back somewhere else. Has had them on her back, arms, legs, abdomin. States that they are very itchy. Denies shortness of breath, chest or throat tightness.     ROS: As per HPI and below:  General: No fever.  No chills.  Eyes: No visual changes.  Head: No headache.    Integument:Positive rash.  Chest: No shortness of breath.  Cardiovascular: No chest pain.  Abdomen: No abdominal pain.  No nausea or vomiting.  Urinary: No abnormal urination.  Neurologic: No focal weakness.  No numbness.  Hematologic: No easy bruising.  Endocrine: No excessive thirst or urination.      Review of patient's allergies indicates:  No Known Allergies    No current facility-administered medications on file prior to encounter.      Current Outpatient Medications on File Prior to Encounter   Medication Sig Dispense Refill    norgestimate-ethinyl estradiol (ORTHO-CYCLEN) 0.25-35 mg-mcg per tablet Take 1 tablet by mouth once daily. 28 tablet 11    triamcinolone acetonide 0.1% (KENALOG) 0.1 % ointment Apply to affected area two to three times daily 30 g 0    valACYclovir (VALTREX) 1000 MG tablet Take 1 tablet (1,000 mg total) by mouth 3 (three) times daily. for 7 days 21 tablet 0       PMH:  As per HPI and below:  Past Medical History:   Diagnosis Date    Abnormal Pap smear of cervix     cryo yrs. ago, then 3-2017 hpv positive, pap normal, 2017 colpo ECC normal,     Endometriosis     per  dx lap Bad endo per pt    HPV (human papilloma virus) infection     PID (pelvic inflammatory disease)      Past Surgical History:   Procedure Laterality Date     SECTION      COLONOSCOPY N/A 2017    Procedure: COLONOSCOPY;  Surgeon: Jose Zaidi MD;  Location: [facility] ([address] [place] [address]);  Service: Endoscopy;  Laterality: N/A;    fallopian tube removal Bilateral     GYNECOLOGIC CRYOSURGERY      HYSTEROSCOPY      2015    In Vitro      Dr Anuja Santana       Social History     Socioeconomic History    Marital status: Single     Spouse name: Not on file    Number of children: Not on file    Years of education: Not on file    Highest education level: Not on file   Occupational History    Not on file   Social Needs    Financial resource strain: Not on file    Food insecurity     Worry: Not on file     Inability: Not on file    Transportation needs     Medical: Not on file     Non-medical: Not on file   Tobacco Use    Smoking status: Never Smoker    Smokeless tobacco: Never Used   Substance and Sexual Activity    Alcohol use: Yes     Frequency: Monthly or less     Drinks per session: 1 or 2     Binge frequency: Never     Comment: occasional    Drug use: No    Sexual activity: Yes     Partners: Male     Birth control/protection: See Surgical Hx     Comment: Single: In a relationship   Lifestyle    Physical activity     Days per week: 1 day     Minutes per session: 60 min    Stress: Not on file   Relationships    Social connections     Talks on phone: More than three times a week     Gets together: Once a week     Attends Muslim service: Not on file     Active member of club or organization: No     Attends meetings of clubs or organizations: Never     Relationship status: Never    Other Topics Concern    Not on file   Social History Narrative    Not on file       Family History   Problem Relation Age of Onset    Heart disease Father     No Known Problems Mother     No  Known Problems Daughter     Meningitis Brother     Breast cancer Neg Hx     Colon cancer Neg Hx     Ovarian cancer Neg Hx     Cancer Neg Hx     Colon polyps Neg Hx     Celiac disease Neg Hx     Esophageal cancer Neg Hx     Stomach cancer Neg Hx     Irritable bowel syndrome Neg Hx     Inflammatory bowel disease Neg Hx        Physical Exam:    Vitals:    07/02/20 2031   BP: 117/71   BP Location: Right arm   Patient Position: Sitting   Pulse: 83   Resp: 18   Temp: 98.2 °F (36.8 °C)   TempSrc: Oral   SpO2: 97%     Appearance:  No acute distress.  Comfortable.  Skin: Scattered hives with only a few present. She showed me more pictures on her phone. Good turgor.  Head: Normocephalic.  Atraumatic.    Eyes: No conjunctival injection.  No swelling.  Chest: Normal work of breathing.  No retractions.  No stridor.  Cardiovascular: Regular rhythm.  No appearance of shock.  No edema.  Abdomen:  No distention.    Musculoskeletal:  No deformities.  Normal range of motion.    Neurologic:  Normal movement and ambulation.  Mental Status:  Alert and oriented x 3.  Appropriate, conversant.      Laboratory Studies:  Labs Reviewed - No data to display    Chart reviewed.     Imaging Results    None         Medications Given:  Medications   predniSONE tablet 60 mg (has no administration in time range)   diphenhydrAMINE capsule 50 mg (has no administration in time range)             MDM:    34 y.o. female with intermittent urticaria for one week. No recent changes in soaps, cleaning supplies, detergents, or medications. Never happened before. No respiratory symptoms. Swallows without difficulty. Will have her take antihistimines and a few days of steroids. Will refer to an allergist.     Diagnostic Impression:    1. Urticaria         ED Disposition Condition    Discharge Stable        ED Prescriptions     Medication Sig Dispense Start Date End Date Auth. Provider    predniSONE (DELTASONE) 20 MG tablet Take 2 tablets (40 mg total)  by mouth once daily. for 5 days 10 tablet 7/3/2020 7/8/2020 Tony Wang MD        Follow-up Information     Follow up With Specialties Details Why Contact Info Additional Information    [facility] - Allergy/ Immunology Allergy Schedule an appointment as soon as possible for a visit   [address]  [facility] [id number]  [phone] [facility] Bld[facility].                           Tony Wang MD  07/03/20 0926

## 2020-07-09 ENCOUNTER — HOSPITAL ENCOUNTER (EMERGENCY)
Facility: HOSPITAL | Age: 35
Discharge: HOME | End: 2020-07-09
Attending: EMERGENCY MEDICINE
Payer: MEDICAID

## 2020-07-09 VITALS
BODY MASS INDEX: 29.73 KG/M2 | HEART RATE: 99 BPM | RESPIRATION RATE: 14 BRPM | SYSTOLIC BLOOD PRESSURE: 125 MMHG | TEMPERATURE: 98 F | OXYGEN SATURATION: 99 % | WEIGHT: 185 LBS | DIASTOLIC BLOOD PRESSURE: 74 MMHG | HEIGHT: 66 IN

## 2020-07-09 DIAGNOSIS — L50.9 URTICARIA: Primary | ICD-10-CM

## 2020-07-09 LAB
ALBUMIN SERPL BCP-MCNC: 3.8 G/DL (ref 3.5–5.2)
ALP SERPL-CCNC: 51 U/L (ref 55–135)
ALT SERPL W/O P-5'-P-CCNC: 9 U/L (ref 10–44)
ANION GAP SERPL CALC-SCNC: 7 MMOL/L (ref 8–16)
AST SERPL-CCNC: 11 U/L (ref 10–40)
BASOPHILS # BLD AUTO: 0.01 K/UL (ref 0–0.2)
BASOPHILS NFR BLD: 0.1 % (ref 0–1.9)
BILIRUB SERPL-MCNC: 0.6 MG/DL (ref 0.1–1)
BUN SERPL-MCNC: 17 MG/DL (ref 6–20)
C4 SERPL-MCNC: 27 MG/DL (ref 11–44)
CALCIUM SERPL-MCNC: 8.8 MG/DL (ref 8.7–10.5)
CHLORIDE SERPL-SCNC: 105 MMOL/L (ref 95–110)
CO2 SERPL-SCNC: 24 MMOL/L (ref 23–29)
CREAT SERPL-MCNC: 0.8 MG/DL (ref 0.5–1.4)
CRP SERPL-MCNC: 2 MG/L (ref 0–8.2)
DIFFERENTIAL METHOD: ABNORMAL
EOSINOPHIL # BLD AUTO: 0.2 K/UL (ref 0–0.5)
EOSINOPHIL NFR BLD: 2.3 % (ref 0–8)
ERYTHROCYTE [DISTWIDTH] IN BLOOD BY AUTOMATED COUNT: 15.3 % (ref 11.5–14.5)
ERYTHROCYTE [SEDIMENTATION RATE] IN BLOOD BY WESTERGREN METHOD: 6 MM/HR (ref 0–36)
EST. GFR  (AFRICAN AMERICAN): >60 ML/MIN/1.73 M^2
EST. GFR  (NON AFRICAN AMERICAN): >60 ML/MIN/1.73 M^2
GLUCOSE SERPL-MCNC: 87 MG/DL (ref 70–110)
HCT VFR BLD AUTO: 40.9 % (ref 37–48.5)
HGB BLD-MCNC: 12.2 G/DL (ref 12–16)
IMM GRANULOCYTES # BLD AUTO: 0.02 K/UL (ref 0–0.04)
IMM GRANULOCYTES NFR BLD AUTO: 0.3 % (ref 0–0.5)
LYMPHOCYTES # BLD AUTO: 2.3 K/UL (ref 1–4.8)
LYMPHOCYTES NFR BLD: 31.9 % (ref 18–48)
MCH RBC QN AUTO: 22 PG (ref 27–31)
MCHC RBC AUTO-ENTMCNC: 29.8 G/DL (ref 32–36)
MCV RBC AUTO: 74 FL (ref 82–98)
MONOCYTES # BLD AUTO: 0.6 K/UL (ref 0.3–1)
MONOCYTES NFR BLD: 7.7 % (ref 4–15)
NEUTROPHILS # BLD AUTO: 4.2 K/UL (ref 1.8–7.7)
NEUTROPHILS NFR BLD: 57.7 % (ref 38–73)
NRBC BLD-RTO: 0 /100 WBC
PLATELET # BLD AUTO: 246 K/UL (ref 150–350)
PMV BLD AUTO: 12.3 FL (ref 9.2–12.9)
POTASSIUM SERPL-SCNC: 4 MMOL/L (ref 3.5–5.1)
PROT SERPL-MCNC: 7.2 G/DL (ref 6–8.4)
RBC # BLD AUTO: 5.55 M/UL (ref 4–5.4)
SODIUM SERPL-SCNC: 136 MMOL/L (ref 136–145)
WBC # BLD AUTO: 7.24 K/UL (ref 3.9–12.7)

## 2020-07-09 PROCEDURE — 99284 EMERGENCY DEPT VISIT MOD MDM: CPT | Mod: 25

## 2020-07-09 PROCEDURE — 86038 ANTINUCLEAR ANTIBODIES: CPT

## 2020-07-09 PROCEDURE — 86255 FLUORESCENT ANTIBODY SCREEN: CPT

## 2020-07-09 PROCEDURE — 99284 EMERGENCY DEPT VISIT MOD MDM: CPT | Mod: ,,, | Performed by: EMERGENCY MEDICINE

## 2020-07-09 PROCEDURE — 63600175 PHARM REV CODE 636 W HCPCS: Performed by: EMERGENCY MEDICINE

## 2020-07-09 PROCEDURE — 86160 COMPLEMENT ANTIGEN: CPT

## 2020-07-09 PROCEDURE — 99284 PR EMERGENCY DEPT VISIT,LEVEL IV: ICD-10-PCS | Mod: ,,, | Performed by: EMERGENCY MEDICINE

## 2020-07-09 PROCEDURE — 86140 C-REACTIVE PROTEIN: CPT

## 2020-07-09 PROCEDURE — 80053 COMPREHEN METABOLIC PANEL: CPT

## 2020-07-09 PROCEDURE — 96372 THER/PROPH/DIAG INJ SC/IM: CPT

## 2020-07-09 PROCEDURE — 85652 RBC SED RATE AUTOMATED: CPT

## 2020-07-09 PROCEDURE — 86592 SYPHILIS TEST NON-TREP QUAL: CPT

## 2020-07-09 PROCEDURE — 85025 COMPLETE CBC W/AUTO DIFF WBC: CPT

## 2020-07-09 RX ORDER — DEXAMETHASONE SODIUM PHOSPHATE 4 MG/ML
12 INJECTION, SOLUTION INTRA-ARTICULAR; INTRALESIONAL; INTRAMUSCULAR; INTRAVENOUS; SOFT TISSUE
Status: COMPLETED | OUTPATIENT
Start: 2020-07-09 | End: 2020-07-09

## 2020-07-09 RX ORDER — DIPHENHYDRAMINE HCL 25 MG
25 CAPSULE ORAL EVERY 6 HOURS PRN
COMMUNITY
End: 2020-08-14

## 2020-07-09 RX ORDER — LORATADINE 10 MG/1
10 TABLET ORAL DAILY
COMMUNITY
End: 2020-08-14

## 2020-07-09 RX ADMIN — DEXAMETHASONE SODIUM PHOSPHATE 12 MG: 4 INJECTION, SOLUTION INTRA-ARTICULAR; INTRALESIONAL; INTRAMUSCULAR; INTRAVENOUS; SOFT TISSUE at 09:07

## 2020-07-09 NOTE — ED PROVIDER NOTES
Encounter Date: 2020    SCRIBE #1 NOTE: I, Vinod Kelly, am scribing for, and in the presence of,  Jose Yip MD. I have scribed the entire note.   SCRIBE #2 NOTE: I, Niharikalaura Wheeler, am scribing for, and in the presence of, Jose Yip MD.     History     Chief Complaint   Patient presents with    Rash     + generalzied rash to body x 1 week. seen for similar symtpoms x 1 weeks ago. Last dose of Benadryl last PM.      The history is provided by the patient and medical records.      Ms. Lyon is a 34 y.o. female with h/o endometriosis and no other relevant medical history here today with rash on her back, bilateral abdomen, and legs. She was seen in the ED about a week ago for similar symptoms and was given a 5 day course of steroids, though the problem initially began 3 weeks ago. She has been unable to book an appointment with an allergist or her PCP despite multiple attempts at both. At the beginning, she states that the rash would disappear overnight and reappear the next day, however they are now constantly there and have worsened. 2 Benadryl's taken at night initially alleviated the symptoms, but provide no relief in the morning as they have worn off at that time. She reports the associated symptoms of swollen fingers and that her throat hurts when she swallows. Denies any trouble swallowing. She denies any recent changes in lotions as well as any sores in her mouth or throat, shortness of breath, or any swelling in her throat.      Review of patient's allergies indicates:  No Known Allergies  Past Medical History:   Diagnosis Date    Abnormal Pap smear of cervix     cryo yrs. ago, then 3- hpv positive, pap normal, 2017 colpo ECC normal,     Endometriosis     per dx lap Bad endo per pt    HPV (human papilloma virus) infection     PID (pelvic inflammatory disease)      Past Surgical History:   Procedure Laterality Date     SECTION      COLONOSCOPY N/A 2017     Procedure: COLONOSCOPY;  Surgeon: Jose Zaidi MD;  Location: [facility] ([address] [place] [address]);  Service: Endoscopy;  Laterality: N/A;    fallopian tube removal Bilateral     GYNECOLOGIC CRYOSURGERY      HYSTEROSCOPY      Feb, 2015    In Vitro  2015    Dr Anuja Santana     Family History   Problem Relation Age of Onset    Heart disease Father     No Known Problems Mother     No Known Problems Daughter     Meningitis Brother     Breast cancer Neg Hx     Colon cancer Neg Hx     Ovarian cancer Neg Hx     Cancer Neg Hx     Colon polyps Neg Hx     Celiac disease Neg Hx     Esophageal cancer Neg Hx     Stomach cancer Neg Hx     Irritable bowel syndrome Neg Hx     Inflammatory bowel disease Neg Hx      Social History     Tobacco Use    Smoking status: Never Smoker    Smokeless tobacco: Never Used   Substance Use Topics    Alcohol use: Yes     Frequency: Monthly or less     Drinks per session: 1 or 2     Binge frequency: Never     Comment: occasional    Drug use: No     Review of Systems   Constitutional: Negative for fever.   HENT: Positive for sore throat. Negative for trouble swallowing.         +Painful when swallowing   Respiratory: Negative for shortness of breath.    Cardiovascular: Negative for chest pain.   Gastrointestinal: Negative for nausea.   Genitourinary: Negative for dysuria.   Musculoskeletal: Negative for back pain.   Skin: Positive for rash.   Neurological: Negative for weakness and headaches.   Hematological: Does not bruise/bleed easily.       Physical Exam     Initial Vitals [07/09/20 0858]   BP Pulse Resp Temp SpO2   125/74 99 14 97.6 °F (36.4 °C) 99 %      MAP       --         Physical Exam    Nursing note and vitals reviewed.  Constitutional: She appears well-developed and well-nourished. She is not diaphoretic. No distress.   HENT:   Head: Normocephalic and atraumatic.   Right Ear: External ear normal.   Left Ear: External ear normal.   Neck: Neck supple.   Cardiovascular: Normal  rate, regular rhythm, normal heart sounds and intact distal pulses.   Pulmonary/Chest: Breath sounds normal. No respiratory distress. She has no wheezes. She has no rhonchi. She has no rales.   Abdominal: Soft. She exhibits no distension. There is no abdominal tenderness. There is no rebound and no guarding.   Neurological: She is alert and oriented to person, place, and time. GCS score is 15. GCS eye subscore is 4. GCS verbal subscore is 5. GCS motor subscore is 6.   Skin: Skin is warm. Capillary refill takes less than 2 seconds. Rash noted.   There is a scattered, raised maculopapular rash on her lower back and bilateral lower abdomen.   Psychiatric: She has a normal mood and affect.         ED Course   Procedures  Labs Reviewed   CBC W/ AUTO DIFFERENTIAL - Abnormal; Notable for the following components:       Result Value    RBC 5.55 (*)     Mean Corpuscular Volume 74 (*)     Mean Corpuscular Hemoglobin 22.0 (*)     Mean Corpuscular Hemoglobin Conc 29.8 (*)     RDW 15.3 (*)     All other components within normal limits   COMPREHENSIVE METABOLIC PANEL - Abnormal; Notable for the following components:    Alkaline Phosphatase 51 (*)     ALT 9 (*)     Anion Gap 7 (*)     All other components within normal limits   C-REACTIVE PROTEIN   SEDIMENTATION RATE   C4 COMPLEMENT   RPR   JOSE PROFILE I (SCREEN) W/ REFLEX   ANTI-NEUTROPHILIC CYTOPLASMIC ANTIBODY          Imaging Results    None          Medical Decision Making:   History:   Old Medical Records: I decided to obtain old medical records.  Old Records Summarized: other records.       <> Summary of Records: Seen here about 1 week ago and discharged on steroids.  Clinical Tests:   Lab Tests: Ordered and Reviewed  ED Management:  Vitals normal.  Afebrile.  Here with vague urticarial rash.  Has been unable to obtain follow-up after ED visit last week.  Itching was improved after steroid course but now it has returned.  She has no known triggers.  Never had this prior to  last few weeks.  No evidence of anaphylaxis or other emergent condition such as Seals-Torres syndrome or TEN.  Certainly easily protecting airway.  Will give dose of Decadron here.  Not taking Benadryl at home but at night.  This is not make her tired.  Advised her to start taking it during the day as well but to use caution if driving on Benadryl.  Given some mild thickening of skin on a he fingers and reported hand swelling, I am concerned this may be some sort of rheumatologic illness.  Will send some screening labs to be followed up by her primary care physician.  Message sent to Dr. Livingston in epic to follow-up these labs.  Advised to continue driving with Dermatology and/or immunology and or rheumatology for further evaluation.  Strongly encouraged primary care follow-up.  Stable for discharge this time.  Return precautions discussed.            Scribe Attestation:   Scribe #1: I performed the above scribed service and the documentation accurately describes the services I performed. I attest to the accuracy of the note.  Scribe #2: I performed the above scribed service and the documentation accurately describes the services I performed. I attest to the accuracy of the note.                          Clinical Impression:       ICD-10-CM ICD-9-CM   1. Urticaria  L50.9 708.9             ED Disposition Condition    Discharge Stable        ED Prescriptions     None        Follow-up Information     Follow up With Specialties Details Why Contact Info Additional Information    Mariah Livingston MD Internal Medicine In 1 week  [address]  [place] [address]  [phone]       [facility] Emergency Medicine  If symptoms worsen [address] [facility] [id number] [phone]     [facility] - Rheumatology Rheumatology   [address] [facility] [id number] [phone] [address] - 5th Floor    [facility] - Dermatology Dermatology   [address] [facility]  [facility] [id number] [phone] [facility], 11th Floor - [address]    [facility] - Allergy/ Immunology Allergy   [address]  [facility] [id number] [phone] [facility] Bldg.                                     Jose Yip MD  07/11/20 1109

## 2020-07-09 NOTE — ED NOTES
Two patient identifiers have been checked and are correct.      Appearance: Pt awake, alert & oriented to person, place & time. Pt in no acute distress at present time. Pt is clean and well groomed with clothes appropriately fastened.   Skin: Skin warm, dry & intact. Color consistent with ethnicity. Mucous membranes moist. No breakdown or brusing noted. + red raised generalized rash noted.   Musculoskeletal: Patient moving all extremities well, no obvious swelling or deformities noted.   Respiratory: Respirations spontaneous, even, and non-labored. Visible chest rise noted. Airway is open and patent. No accessory muscle use noted.   Neurologic: Sensation is intact. Speech is clear and appropriate. Eyes open spontaneously, behavior appropriate to situation, follows commands, facial expression symmetrical, bilateral hand grasp equal and even, purposeful motor response noted.  Cardiac: All peripheral pulses present. No Bilateral lower extremity edema. Cap refill is <3 seconds.

## 2020-07-09 NOTE — DISCHARGE INSTRUCTIONS
As discussed, take Benadryl 50 mg every 6-8 hours as needed for itching.  Return to ED for new or worsening symptoms.

## 2020-07-10 LAB — RPR SER QL: NORMAL

## 2020-07-11 LAB — ANA SER QL IF: NORMAL

## 2020-07-13 LAB
ANCA AB TITR SER IF: NORMAL TITER
P-ANCA TITR SER IF: NORMAL TITER

## 2020-08-05 NOTE — TELEPHONE ENCOUNTER
----- Message from Amairani Cleveland sent at 9/11/2018 11:00 AM CDT -----  Contact: PT Portal Request  Appointment Request From: Syed Lyon    With Provider: Mariah Livingston MD [[facility]]    Preferred Date Range: 9/13/2018 - 9/28/2018    Preferred Times: Any time    Reason for visit: Personal hygiene    Comments:  Having issues with my personal hygiene have tried different things... nothing has helped.       CC:  Jorge Alberto Preston is here today for GI issues, throat pain and acid reflux .      Medications: medications verified and updated  Refills needed today?  NO  denies Latex allergy or sensitivity  Tobacco history: verified  Health Maintenance Due   Topic Date Due   • DM/CKD Microalbumin  05/23/1952   • Shingles Vaccine (2 of 3) 05/08/2014   • Medicare Wellness Visit  03/30/2018   • Depression Screening  12/31/2020     Patient is due for the topics as listed above and wishes to discuss with the provider.  Patient would like communication of messages and results via:      Home Phone: [phone] (home)  Okay to leave a message containing results? Yes     Has ABC/Hippa form been filled out?  Yes     Has the patient completed Advanced Directives? Yes

## 2020-08-12 ENCOUNTER — PATIENT OUTREACH (OUTPATIENT)
Dept: ADMINISTRATIVE | Facility: OTHER | Age: 35
End: 2020-08-12

## 2020-08-13 NOTE — PROGRESS NOTES
Requested updates from Care Everywhere.  Reviewed chart for overdue ASIA topics.  Updated Health Maintenance.   Reconciled immunizations in LINKS.

## 2020-08-14 ENCOUNTER — OFFICE VISIT (OUTPATIENT)
Dept: RHEUMATOLOGY | Facility: CLINIC | Age: 35
End: 2020-08-14
Payer: MEDICAID

## 2020-08-14 VITALS
BODY MASS INDEX: 33.87 KG/M2 | WEIGHT: 210.75 LBS | TEMPERATURE: 97 F | HEIGHT: 66 IN | HEART RATE: 79 BPM | SYSTOLIC BLOOD PRESSURE: 107 MMHG | DIASTOLIC BLOOD PRESSURE: 67 MMHG

## 2020-08-14 DIAGNOSIS — L50.9 HIVES: Primary | ICD-10-CM

## 2020-08-14 DIAGNOSIS — R21 RASH: ICD-10-CM

## 2020-08-14 DIAGNOSIS — L50.9 URTICARIA: ICD-10-CM

## 2020-08-14 PROCEDURE — 99205 OFFICE O/P NEW HI 60 MIN: CPT | Mod: S$PBB,,, | Performed by: INTERNAL MEDICINE

## 2020-08-14 PROCEDURE — 99999 PR PBB SHADOW E&M-EST. PATIENT-LVL V: CPT | Mod: PBBFAC,,, | Performed by: INTERNAL MEDICINE

## 2020-08-14 PROCEDURE — 99205 PR OFFICE/OUTPT VISIT, NEW, LEVL V, 60-74 MIN: ICD-10-PCS | Mod: S$PBB,,, | Performed by: INTERNAL MEDICINE

## 2020-08-14 PROCEDURE — 99999 PR PBB SHADOW E&M-EST. PATIENT-LVL V: ICD-10-PCS | Mod: PBBFAC,,, | Performed by: INTERNAL MEDICINE

## 2020-08-14 PROCEDURE — 99215 OFFICE O/P EST HI 40 MIN: CPT | Mod: PBBFAC | Performed by: INTERNAL MEDICINE

## 2020-08-14 RX ORDER — AZELASTINE 1 MG/ML
SPRAY, METERED NASAL
COMMUNITY
Start: 2020-07-21

## 2020-08-14 RX ORDER — EPINEPHRINE 0.3 MG/.3ML
INJECTION SUBCUTANEOUS
COMMUNITY
Start: 2020-07-21

## 2020-08-14 RX ORDER — FLUTICASONE PROPIONATE 50 MCG
SPRAY, SUSPENSION (ML) NASAL
COMMUNITY
Start: 2020-07-21

## 2020-08-14 RX ORDER — LEVOCETIRIZINE DIHYDROCHLORIDE 5 MG/1
TABLET, FILM COATED ORAL
COMMUNITY
Start: 2020-07-21 | End: 2022-06-27 | Stop reason: ALTCHOICE

## 2020-08-14 NOTE — PROGRESS NOTES
Chief Complaint   Patient presents with    Disease Management       Patient was referred by     History of presenting illness    34 year old black female comes with hives     She has hives for 3 months     Its on the back,abdomen,legs and arms and also hands and feet    Doesn't know triggers    She did skin test and she has allergies to eggs,soy bean,shell fish,oysters,strawberries,tomatoes,green beans,broccolis,mustard,nuts/almonds,cauliflowers,trees,grass,mold,dust,mouse and horse    Pins and needles in the fingers+    She is now using nasal spray and levocetrizine  She uses fluticasone and azelastin  She has stayed away from all the allergens  She has changed detergents and lotions     She had 2 episodes of throat swelling needing ER evaluations    One episode of lip swelling feb 2020    She also has nasal stuffiness,itchy eyes and sinus issues  No asthma like symptoms     Past history : none    2 episodes of pneumonia  Once in high school  Once during pregnancy    Family history : heart disease    Social history : not a smoker,alcohol user         Review of Systems       No malar rash,photosensitivity   No telangiectasias   No calcinosis   No psoriasis   No patchy alopecia   No oral and nasal ulcers   No dry eyes and dry mouth   No pleurisy or any cardiopulmonary complaints   No dysphagia,diplopia and dysphonia and muscle weakness   No joint pains  No n/v/d/c   No acid reflux+   No raynaud's+   No digital ulcers   No cytopenias   No renal issues   No blood clots   No fever,chills,night sweats,weight loss and loss of appetite   No pregnancy losses/pre term deliveries  Pregnancy : she was sick : with nausea and vomiting and had one pneumonia and she needed emergent C section  No other infections   No new onset headaches   No recurrent conjunctivitis or uveitis or scleritis or episcleritis   No chronic or bloody diarrhea with no u colitis or crohn's /inflammatory bowel disease   No vaginal or  urethral   d/c/STDs/no ulcers   No lymphadenopathy and parotitis  No unexplained neurologic symptoms/seizures and strokes       Labs     ANCA neg  ESR,CRP nml  JOSE neg  CBC : low MCV  Nml white count,plts  CMP nml  Anemia with low MCV/iron infusions needed  HIV,hepb,RPR neg        Physical Exam   Constitutional: She is oriented to person, place, and time and well-developed, well-nourished, and in no distress. No distress.   HENT:   Head: Normocephalic.   Mouth/Throat: Oropharynx is clear and moist.   Eyes: Conjunctivae are normal. Pupils are equal, round, and reactive to light. Right eye exhibits no discharge. Left eye exhibits no discharge. No scleral icterus.   Neck: Normal range of motion. No thyromegaly present.   Cardiovascular: Normal rate, regular rhythm, normal heart sounds and intact distal pulses.    Pulmonary/Chest: Effort normal and breath sounds normal. No stridor.   Abdominal: Soft. Bowel sounds are normal.   Lymphadenopathy:     She has no cervical adenopathy.   Neurological: She is alert and oriented to person, place, and time.   Skin: Skin is warm. No rash noted. She is not diaphoretic.     Psychiatric: Affect and judgment normal.   Musculoskeletal: Normal range of motion.       2 hives/raised erythematous rashes on the right leg and one on the back      Assessment       34 year old black female presents with    Recurrent hives for 3 months  One episode of lip swelling  2 episodes of throat swelling    Long h/o seasonal allergies,with rhinosinusitis     She did skin test and she has allergies to eggs,soy bean,shell fish,oysters,strawberries,tomatoes,green beans,broccolis,mustard,nuts/almonds,cauliflowers,trees,grass,mold,dust,mouse and horse    She is now using nasal spray and levocetrizine  She uses fluticasone and azelastin  She has been constantly avoiding all the triggers in the foods and environment     Interestingly she has had 2 episodes of pneumonia once during high school years and once during  pregnancy  No other infections     Rheumatologic ROS negative     Labs negative for systemic lupus and vasculitis and she has normal inflammatory markers     On exam she has 3 raised slightly erythematous lesions on her right leg and back  I suspect this is recurrent urticaria given the symptoms,pattern of rashes,allergy profile,lack of other symptoms of a systemic autoimmune illness   She claims not to have responded to 2/3 anti histaminics/nasal sprays of steroids and azelastine     Hence further evaluation recommended       1. Hives    2. Urticaria    3. Rash        Reviewed labs/xrays  Reviewed medications    Ordered labs /xrays        New problem     Plan    Will offer dermatology evaluation and see if a biopsy is indicated    Will defer further labs and evaluation until I know more    Continue allergy medications and avoiding allergens     rtc after dermatology visit     Syed was seen today for disease management.    Diagnoses and all orders for this visit:    Hives  -     Ambulatory referral/consult to Dermatology; Future    Urticaria  -     Ambulatory referral/consult to Rheumatology  -     Ambulatory referral/consult to Dermatology; Future    Rash  -     Ambulatory referral/consult to Dermatology; Future  -     Ambulatory referral/consult to Dermatology; Future

## 2020-08-14 NOTE — LETTER
August 14, 2020      Jose Yip MD  [address]  [facility] [address]           [facility]  [address]  [facility] [address]  Phone: [phone]  Fax: [phone]          Patient: Syed Lyon   MR Number: [id number]   YOB: 1985   Date of Visit: 8/14/2020       Dear Dr. Jose Yip:    Thank you for referring Syed Lyon to me for evaluation. Attached you will find relevant portions of my assessment and plan of care.    If you have questions, please do not hesitate to call me. I look forward to following Syed Lyon along with you.    Sincerely,    Steven Novoa MD    Enclosure  CC:  No Recipients    If you would like to receive this communication electronically, please contact [email or web address] or [phone] to request more information on [employer] Link access.    For providers and/or their staff who would like to refer a patient to [facility], please contact us through our one-stop-shop provider referral line, [facility], at [phone].    If you feel you have received this communication in error or would no longer like to receive these types of communications, please e-mail [email or web address]

## 2020-08-14 NOTE — PROGRESS NOTES
Rapid3 Question Responses and Scores 8/13/2020   MDHAQ Score 0   Psychologic Score 0   Pain Score 2   When you awakened in the morning OVER THE LAST WEEK, did you feel stiff? No   Fatigue Score 1   Global Health Score 0.5   RAPID3 Score 0.83         Answers for HPI/ROS submitted by the patient on 8/13/2020   fever: No  eye redness: No  headaches: Yes  shortness of breath: No  chest pain: No  trouble swallowing: No  diarrhea: No  constipation: No  unexpected weight change: No  genital sore: No  dysuria: No  During the last 3 days, have you had a skin rash?: Yes  Bruises or bleeds easily: Yes  cough: No

## 2020-10-08 ENCOUNTER — PATIENT OUTREACH (OUTPATIENT)
Dept: ADMINISTRATIVE | Facility: OTHER | Age: 35
End: 2020-10-08

## 2020-10-08 NOTE — PROGRESS NOTES
Care Everywhere: updated  Immunization: updated(delay in links)   Health Maintenance: updated  Media Review:   Legacy Review:   Order placed:   Upcoming appts:

## 2020-10-09 ENCOUNTER — OFFICE VISIT (OUTPATIENT)
Dept: OBSTETRICS AND GYNECOLOGY | Facility: CLINIC | Age: 35
End: 2020-10-09
Attending: OBSTETRICS & GYNECOLOGY
Payer: MEDICAID

## 2020-10-09 VITALS
HEIGHT: 66 IN | BODY MASS INDEX: 32.59 KG/M2 | SYSTOLIC BLOOD PRESSURE: 128 MMHG | DIASTOLIC BLOOD PRESSURE: 84 MMHG | WEIGHT: 202.81 LBS

## 2020-10-09 DIAGNOSIS — N92.6 MISSED MENSES: ICD-10-CM

## 2020-10-09 DIAGNOSIS — Z11.3 SCREENING EXAMINATION FOR STD (SEXUALLY TRANSMITTED DISEASE): ICD-10-CM

## 2020-10-09 DIAGNOSIS — Z01.419 ENCOUNTER FOR GYNECOLOGICAL EXAMINATION: Primary | ICD-10-CM

## 2020-10-09 PROCEDURE — 99213 OFFICE O/P EST LOW 20 MIN: CPT | Mod: PBBFAC | Performed by: OBSTETRICS & GYNECOLOGY

## 2020-10-09 PROCEDURE — 99395 PREV VISIT EST AGE 18-39: CPT | Mod: S$PBB,,, | Performed by: OBSTETRICS & GYNECOLOGY

## 2020-10-09 PROCEDURE — 99999 PR PBB SHADOW E&M-EST. PATIENT-LVL III: CPT | Mod: PBBFAC,,, | Performed by: OBSTETRICS & GYNECOLOGY

## 2020-10-09 PROCEDURE — 99999 PR PBB SHADOW E&M-EST. PATIENT-LVL III: ICD-10-PCS | Mod: PBBFAC,,, | Performed by: OBSTETRICS & GYNECOLOGY

## 2020-10-09 PROCEDURE — 99395 PR PREVENTIVE VISIT,EST,18-39: ICD-10-PCS | Mod: S$PBB,,, | Performed by: OBSTETRICS & GYNECOLOGY

## 2020-10-09 NOTE — PROGRESS NOTES
LMP: Patient's last menstrual period was 09/08/2020.   Contraception: The current method of family planning is Estyrella  Meds per MD: Kumar    Last Pap: 9- pap & hpv negative  Last MMG: N/A  Last Colonoscopy: 2017 normal

## 2020-10-11 LAB
C TRACH RRNA SPEC QL NAA+PROBE: NEGATIVE
N GONORRHOEA RRNA SPEC QL NAA+PROBE: NEGATIVE

## 2020-10-11 RX ORDER — NORGESTIMATE AND ETHINYL ESTRADIOL 0.25-0.035
1 KIT ORAL DAILY
Qty: 28 TABLET | Refills: 11 | Status: SHIPPED | OUTPATIENT
Start: 2020-10-11 | End: 2021-04-24

## 2021-01-04 ENCOUNTER — PATIENT MESSAGE (OUTPATIENT)
Dept: ADMINISTRATIVE | Facility: HOSPITAL | Age: 36
End: 2021-01-04

## 2021-02-22 ENCOUNTER — PATIENT MESSAGE (OUTPATIENT)
Dept: OBSTETRICS AND GYNECOLOGY | Facility: CLINIC | Age: 36
End: 2021-02-22

## 2021-02-22 RX ORDER — ACYCLOVIR 50 MG/G
OINTMENT TOPICAL
Qty: 15 G | Refills: 1 | Status: SHIPPED | OUTPATIENT
Start: 2021-02-22

## 2021-02-23 ENCOUNTER — PATIENT MESSAGE (OUTPATIENT)
Dept: RHEUMATOLOGY | Facility: CLINIC | Age: 36
End: 2021-02-23

## 2021-03-15 ENCOUNTER — PATIENT MESSAGE (OUTPATIENT)
Dept: OBSTETRICS AND GYNECOLOGY | Facility: CLINIC | Age: 36
End: 2021-03-15

## 2021-03-25 ENCOUNTER — PATIENT OUTREACH (OUTPATIENT)
Dept: ADMINISTRATIVE | Facility: OTHER | Age: 36
End: 2021-03-25

## 2021-03-26 ENCOUNTER — OFFICE VISIT (OUTPATIENT)
Dept: OBSTETRICS AND GYNECOLOGY | Facility: CLINIC | Age: 36
End: 2021-03-26
Attending: OBSTETRICS & GYNECOLOGY
Payer: MEDICAID

## 2021-03-26 VITALS — BODY MASS INDEX: 32.95 KG/M2 | HEIGHT: 66 IN | WEIGHT: 205 LBS

## 2021-03-26 DIAGNOSIS — R68.82 DECREASED LIBIDO: Primary | ICD-10-CM

## 2021-03-26 PROCEDURE — 99999 PR PBB SHADOW E&M-EST. PATIENT-LVL III: CPT | Mod: PBBFAC,,, | Performed by: OBSTETRICS & GYNECOLOGY

## 2021-03-26 PROCEDURE — 99213 PR OFFICE/OUTPT VISIT, EST, LEVL III, 20-29 MIN: ICD-10-PCS | Mod: S$PBB,,, | Performed by: OBSTETRICS & GYNECOLOGY

## 2021-03-26 PROCEDURE — 99213 OFFICE O/P EST LOW 20 MIN: CPT | Mod: PBBFAC | Performed by: OBSTETRICS & GYNECOLOGY

## 2021-03-26 PROCEDURE — 99213 OFFICE O/P EST LOW 20 MIN: CPT | Mod: S$PBB,,, | Performed by: OBSTETRICS & GYNECOLOGY

## 2021-03-26 PROCEDURE — 99999 PR PBB SHADOW E&M-EST. PATIENT-LVL III: ICD-10-PCS | Mod: PBBFAC,,, | Performed by: OBSTETRICS & GYNECOLOGY

## 2021-04-05 ENCOUNTER — PATIENT MESSAGE (OUTPATIENT)
Dept: ADMINISTRATIVE | Facility: HOSPITAL | Age: 36
End: 2021-04-05

## 2021-04-10 ENCOUNTER — PATIENT MESSAGE (OUTPATIENT)
Dept: OBSTETRICS AND GYNECOLOGY | Facility: CLINIC | Age: 36
End: 2021-04-10

## 2021-05-29 ENCOUNTER — HOSPITAL ENCOUNTER (EMERGENCY)
Facility: HOSPITAL | Age: 36
Discharge: HOME | End: 2021-05-29
Attending: EMERGENCY MEDICINE
Payer: MEDICAID

## 2021-05-29 VITALS
SYSTOLIC BLOOD PRESSURE: 125 MMHG | HEART RATE: 102 BPM | TEMPERATURE: 99 F | OXYGEN SATURATION: 99 % | HEIGHT: 66 IN | BODY MASS INDEX: 30.53 KG/M2 | RESPIRATION RATE: 15 BRPM | DIASTOLIC BLOOD PRESSURE: 80 MMHG | WEIGHT: 190 LBS

## 2021-05-29 DIAGNOSIS — N94.9 ADNEXAL CYST: ICD-10-CM

## 2021-05-29 DIAGNOSIS — R93.89 ABNORMAL CT SCAN: ICD-10-CM

## 2021-05-29 DIAGNOSIS — R10.9 ABDOMINAL PAIN, UNSPECIFIED ABDOMINAL LOCATION: Primary | ICD-10-CM

## 2021-05-29 LAB
ALBUMIN SERPL BCP-MCNC: 3.9 G/DL (ref 3.5–5.2)
ALP SERPL-CCNC: 44 U/L (ref 55–135)
ALT SERPL W/O P-5'-P-CCNC: 12 U/L (ref 10–44)
ANION GAP SERPL CALC-SCNC: 9 MMOL/L (ref 8–16)
AST SERPL-CCNC: 14 U/L (ref 10–40)
B-HCG UR QL: NEGATIVE
BACTERIA #/AREA URNS AUTO: ABNORMAL /HPF
BASOPHILS # BLD AUTO: 0.03 K/UL (ref 0–0.2)
BASOPHILS NFR BLD: 0.7 % (ref 0–1.9)
BILIRUB SERPL-MCNC: 1 MG/DL (ref 0.1–1)
BILIRUB UR QL STRIP: NEGATIVE
BUN SERPL-MCNC: 11 MG/DL (ref 6–20)
CALCIUM SERPL-MCNC: 9.4 MG/DL (ref 8.7–10.5)
CHLORIDE SERPL-SCNC: 104 MMOL/L (ref 95–110)
CLARITY UR REFRACT.AUTO: ABNORMAL
CO2 SERPL-SCNC: 22 MMOL/L (ref 23–29)
COLOR UR AUTO: YELLOW
CREAT SERPL-MCNC: 0.9 MG/DL (ref 0.5–1.4)
CTP QC/QA: YES
DIFFERENTIAL METHOD: ABNORMAL
EOSINOPHIL # BLD AUTO: 0.2 K/UL (ref 0–0.5)
EOSINOPHIL NFR BLD: 5.4 % (ref 0–8)
ERYTHROCYTE [DISTWIDTH] IN BLOOD BY AUTOMATED COUNT: 15.6 % (ref 11.5–14.5)
EST. GFR  (AFRICAN AMERICAN): >60 ML/MIN/1.73 M^2
EST. GFR  (NON AFRICAN AMERICAN): >60 ML/MIN/1.73 M^2
GLUCOSE SERPL-MCNC: 94 MG/DL (ref 70–110)
GLUCOSE UR QL STRIP: NEGATIVE
HCT VFR BLD AUTO: 36 % (ref 37–48.5)
HGB BLD-MCNC: 11.4 G/DL (ref 12–16)
HGB UR QL STRIP: ABNORMAL
HYALINE CASTS UR QL AUTO: 0 /LPF
IMM GRANULOCYTES # BLD AUTO: 0.01 K/UL (ref 0–0.04)
IMM GRANULOCYTES NFR BLD AUTO: 0.2 % (ref 0–0.5)
KETONES UR QL STRIP: NEGATIVE
LEUKOCYTE ESTERASE UR QL STRIP: ABNORMAL
LIPASE SERPL-CCNC: 7 U/L (ref 4–60)
LYMPHOCYTES # BLD AUTO: 1.3 K/UL (ref 1–4.8)
LYMPHOCYTES NFR BLD: 28.6 % (ref 18–48)
MCH RBC QN AUTO: 22.1 PG (ref 27–31)
MCHC RBC AUTO-ENTMCNC: 31.7 G/DL (ref 32–36)
MCV RBC AUTO: 70 FL (ref 82–98)
MICROSCOPIC COMMENT: ABNORMAL
MONOCYTES # BLD AUTO: 0.4 K/UL (ref 0.3–1)
MONOCYTES NFR BLD: 9.2 % (ref 4–15)
NEUTROPHILS # BLD AUTO: 2.5 K/UL (ref 1.8–7.7)
NEUTROPHILS NFR BLD: 55.9 % (ref 38–73)
NITRITE UR QL STRIP: NEGATIVE
NRBC BLD-RTO: 0 /100 WBC
PH UR STRIP: 7 [PH] (ref 5–8)
PLATELET # BLD AUTO: 225 K/UL (ref 150–450)
PMV BLD AUTO: 11.6 FL (ref 9.2–12.9)
POTASSIUM SERPL-SCNC: 4 MMOL/L (ref 3.5–5.1)
PROT SERPL-MCNC: 7.6 G/DL (ref 6–8.4)
PROT UR QL STRIP: ABNORMAL
RBC # BLD AUTO: 5.16 M/UL (ref 4–5.4)
RBC #/AREA URNS AUTO: 4 /HPF (ref 0–4)
SODIUM SERPL-SCNC: 135 MMOL/L (ref 136–145)
SP GR UR STRIP: 1.02 (ref 1–1.03)
SQUAMOUS #/AREA URNS AUTO: 4 /HPF
URN SPEC COLLECT METH UR: ABNORMAL
WBC # BLD AUTO: 4.47 K/UL (ref 3.9–12.7)
WBC #/AREA URNS AUTO: 2 /HPF (ref 0–5)

## 2021-05-29 PROCEDURE — 86803 HEPATITIS C AB TEST: CPT | Performed by: EMERGENCY MEDICINE

## 2021-05-29 PROCEDURE — 81025 URINE PREGNANCY TEST: CPT | Performed by: PHYSICIAN ASSISTANT

## 2021-05-29 PROCEDURE — 25000003 PHARM REV CODE 250: Performed by: PHYSICIAN ASSISTANT

## 2021-05-29 PROCEDURE — 80053 COMPREHEN METABOLIC PANEL: CPT | Performed by: PHYSICIAN ASSISTANT

## 2021-05-29 PROCEDURE — 81001 URINALYSIS AUTO W/SCOPE: CPT | Performed by: PHYSICIAN ASSISTANT

## 2021-05-29 PROCEDURE — 99284 EMERGENCY DEPT VISIT MOD MDM: CPT | Mod: ,,, | Performed by: PHYSICIAN ASSISTANT

## 2021-05-29 PROCEDURE — 96375 TX/PRO/DX INJ NEW DRUG ADDON: CPT

## 2021-05-29 PROCEDURE — 63600175 PHARM REV CODE 636 W HCPCS: Performed by: PHYSICIAN ASSISTANT

## 2021-05-29 PROCEDURE — 99285 EMERGENCY DEPT VISIT HI MDM: CPT | Mod: 25

## 2021-05-29 PROCEDURE — 25500020 PHARM REV CODE 255: Performed by: EMERGENCY MEDICINE

## 2021-05-29 PROCEDURE — 96374 THER/PROPH/DIAG INJ IV PUSH: CPT | Mod: 59

## 2021-05-29 PROCEDURE — 96361 HYDRATE IV INFUSION ADD-ON: CPT

## 2021-05-29 PROCEDURE — 83690 ASSAY OF LIPASE: CPT | Performed by: PHYSICIAN ASSISTANT

## 2021-05-29 PROCEDURE — 99284 PR EMERGENCY DEPT VISIT,LEVEL IV: ICD-10-PCS | Mod: ,,, | Performed by: PHYSICIAN ASSISTANT

## 2021-05-29 PROCEDURE — 86703 HIV-1/HIV-2 1 RESULT ANTBDY: CPT | Performed by: EMERGENCY MEDICINE

## 2021-05-29 PROCEDURE — 85025 COMPLETE CBC W/AUTO DIFF WBC: CPT | Performed by: PHYSICIAN ASSISTANT

## 2021-05-29 RX ORDER — ONDANSETRON 4 MG/1
4 TABLET, ORALLY DISINTEGRATING ORAL EVERY 6 HOURS PRN
Qty: 20 TABLET | Refills: 0 | Status: SHIPPED | OUTPATIENT
Start: 2021-05-29 | End: 2023-03-01

## 2021-05-29 RX ORDER — ONDANSETRON 4 MG/1
4 TABLET, ORALLY DISINTEGRATING ORAL
Status: DISCONTINUED | OUTPATIENT
Start: 2021-05-29 | End: 2021-05-29

## 2021-05-29 RX ORDER — ONDANSETRON 2 MG/ML
4 INJECTION INTRAMUSCULAR; INTRAVENOUS
Status: COMPLETED | OUTPATIENT
Start: 2021-05-29 | End: 2021-05-29

## 2021-05-29 RX ORDER — PHENTERMINE HYDROCHLORIDE 37.5 MG/1
37.5 CAPSULE ORAL EVERY MORNING
COMMUNITY
End: 2021-06-28

## 2021-05-29 RX ORDER — KETOROLAC TROMETHAMINE 30 MG/ML
10 INJECTION, SOLUTION INTRAMUSCULAR; INTRAVENOUS
Status: COMPLETED | OUTPATIENT
Start: 2021-05-29 | End: 2021-05-29

## 2021-05-29 RX ORDER — IBUPROFEN 600 MG/1
600 TABLET ORAL EVERY 6 HOURS PRN
Qty: 20 TABLET | Refills: 0 | Status: SHIPPED | OUTPATIENT
Start: 2021-05-29 | End: 2022-10-18

## 2021-05-29 RX ORDER — IBUPROFEN 600 MG/1
600 TABLET ORAL
Status: COMPLETED | OUTPATIENT
Start: 2021-05-29 | End: 2021-05-29

## 2021-05-29 RX ADMIN — IBUPROFEN 600 MG: 600 TABLET ORAL at 11:05

## 2021-05-29 RX ADMIN — SODIUM CHLORIDE, SODIUM LACTATE, POTASSIUM CHLORIDE, AND CALCIUM CHLORIDE 1000 ML: .6; .31; .03; .02 INJECTION, SOLUTION INTRAVENOUS at 08:05

## 2021-05-29 RX ADMIN — IOHEXOL 100 ML: 350 INJECTION, SOLUTION INTRAVENOUS at 10:05

## 2021-05-29 RX ADMIN — ONDANSETRON 4 MG: 2 INJECTION INTRAMUSCULAR; INTRAVENOUS at 08:05

## 2021-05-29 RX ADMIN — KETOROLAC TROMETHAMINE 10 MG: 30 INJECTION, SOLUTION INTRAMUSCULAR at 08:05

## 2021-05-31 LAB
HCV AB SERPL QL IA: NEGATIVE
HIV 1+2 AB+HIV1 P24 AG SERPL QL IA: NEGATIVE

## 2021-06-10 ENCOUNTER — PATIENT MESSAGE (OUTPATIENT)
Dept: OBSTETRICS AND GYNECOLOGY | Facility: CLINIC | Age: 36
End: 2021-06-10

## 2021-06-10 DIAGNOSIS — N83.209 CYST OF OVARY, UNSPECIFIED LATERALITY: Primary | ICD-10-CM

## 2021-06-23 ENCOUNTER — PATIENT MESSAGE (OUTPATIENT)
Dept: OBSTETRICS AND GYNECOLOGY | Facility: CLINIC | Age: 36
End: 2021-06-23

## 2021-06-28 ENCOUNTER — OFFICE VISIT (OUTPATIENT)
Dept: OBSTETRICS AND GYNECOLOGY | Facility: CLINIC | Age: 36
End: 2021-06-28
Attending: OBSTETRICS & GYNECOLOGY
Payer: MEDICAID

## 2021-06-28 ENCOUNTER — LAB VISIT (OUTPATIENT)
Dept: LAB | Facility: HOSPITAL | Age: 36
End: 2021-06-28
Attending: OBSTETRICS & GYNECOLOGY
Payer: MEDICAID

## 2021-06-28 VITALS — HEIGHT: 66 IN | BODY MASS INDEX: 30.47 KG/M2 | WEIGHT: 189.63 LBS

## 2021-06-28 DIAGNOSIS — N93.9 ABNORMAL UTERINE BLEEDING (AUB): Primary | ICD-10-CM

## 2021-06-28 DIAGNOSIS — N93.9 ABNORMAL UTERINE BLEEDING (AUB): ICD-10-CM

## 2021-06-28 LAB
BASOPHILS # BLD AUTO: 0.04 K/UL (ref 0–0.2)
BASOPHILS NFR BLD: 0.7 % (ref 0–1.9)
DIFFERENTIAL METHOD: ABNORMAL
EOSINOPHIL # BLD AUTO: 0.4 K/UL (ref 0–0.5)
EOSINOPHIL NFR BLD: 6.1 % (ref 0–8)
ERYTHROCYTE [DISTWIDTH] IN BLOOD BY AUTOMATED COUNT: 16.9 % (ref 11.5–14.5)
HCT VFR BLD AUTO: 36.4 % (ref 37–48.5)
HGB BLD-MCNC: 11.5 G/DL (ref 12–16)
IMM GRANULOCYTES # BLD AUTO: 0.01 K/UL (ref 0–0.04)
IMM GRANULOCYTES NFR BLD AUTO: 0.2 % (ref 0–0.5)
LYMPHOCYTES # BLD AUTO: 1.8 K/UL (ref 1–4.8)
LYMPHOCYTES NFR BLD: 31 % (ref 18–48)
MCH RBC QN AUTO: 23.2 PG (ref 27–31)
MCHC RBC AUTO-ENTMCNC: 31.6 G/DL (ref 32–36)
MCV RBC AUTO: 74 FL (ref 82–98)
MONOCYTES # BLD AUTO: 0.6 K/UL (ref 0.3–1)
MONOCYTES NFR BLD: 10.8 % (ref 4–15)
NEUTROPHILS # BLD AUTO: 3 K/UL (ref 1.8–7.7)
NEUTROPHILS NFR BLD: 51.2 % (ref 38–73)
NRBC BLD-RTO: 0 /100 WBC
PLATELET # BLD AUTO: 275 K/UL (ref 150–450)
PMV BLD AUTO: 12.4 FL (ref 9.2–12.9)
RBC # BLD AUTO: 4.95 M/UL (ref 4–5.4)
WBC # BLD AUTO: 5.9 K/UL (ref 3.9–12.7)

## 2021-06-28 PROCEDURE — 84443 ASSAY THYROID STIM HORMONE: CPT | Performed by: OBSTETRICS & GYNECOLOGY

## 2021-06-28 PROCEDURE — 99213 PR OFFICE/OUTPT VISIT, EST, LEVL III, 20-29 MIN: ICD-10-PCS | Mod: S$PBB,,, | Performed by: OBSTETRICS & GYNECOLOGY

## 2021-06-28 PROCEDURE — 99999 PR PBB SHADOW E&M-EST. PATIENT-LVL III: ICD-10-PCS | Mod: PBBFAC,,, | Performed by: OBSTETRICS & GYNECOLOGY

## 2021-06-28 PROCEDURE — 99213 OFFICE O/P EST LOW 20 MIN: CPT | Mod: PBBFAC,PN | Performed by: OBSTETRICS & GYNECOLOGY

## 2021-06-28 PROCEDURE — 99999 PR PBB SHADOW E&M-EST. PATIENT-LVL III: CPT | Mod: PBBFAC,,, | Performed by: OBSTETRICS & GYNECOLOGY

## 2021-06-28 PROCEDURE — 84702 CHORIONIC GONADOTROPIN TEST: CPT | Performed by: OBSTETRICS & GYNECOLOGY

## 2021-06-28 PROCEDURE — 99213 OFFICE O/P EST LOW 20 MIN: CPT | Mod: S$PBB,,, | Performed by: OBSTETRICS & GYNECOLOGY

## 2021-06-28 PROCEDURE — 85025 COMPLETE CBC W/AUTO DIFF WBC: CPT | Performed by: OBSTETRICS & GYNECOLOGY

## 2021-06-28 RX ORDER — PHENTERMINE HYDROCHLORIDE 37.5 MG/1
37.5 TABLET ORAL NIGHTLY
COMMUNITY
Start: 2021-05-27 | End: 2022-01-03

## 2021-06-29 LAB
HCG INTACT+B SERPL-ACNC: <1.2 MIU/ML
TSH SERPL DL<=0.005 MIU/L-ACNC: 0.7 UIU/ML (ref 0.4–4)

## 2021-07-01 ENCOUNTER — OFFICE VISIT (OUTPATIENT)
Dept: OBSTETRICS AND GYNECOLOGY | Facility: CLINIC | Age: 36
End: 2021-07-01
Attending: OBSTETRICS & GYNECOLOGY
Payer: MEDICAID

## 2021-07-01 ENCOUNTER — PATIENT MESSAGE (OUTPATIENT)
Dept: OBSTETRICS AND GYNECOLOGY | Facility: CLINIC | Age: 36
End: 2021-07-01

## 2021-07-01 ENCOUNTER — APPOINTMENT (OUTPATIENT)
Dept: RADIOLOGY | Facility: CLINIC | Age: 36
End: 2021-07-01
Attending: OBSTETRICS & GYNECOLOGY
Payer: MEDICAID

## 2021-07-01 VITALS
DIASTOLIC BLOOD PRESSURE: 80 MMHG | WEIGHT: 189.63 LBS | HEIGHT: 66 IN | BODY MASS INDEX: 30.47 KG/M2 | SYSTOLIC BLOOD PRESSURE: 124 MMHG

## 2021-07-01 DIAGNOSIS — N83.209 CYST OF OVARY, UNSPECIFIED LATERALITY: ICD-10-CM

## 2021-07-01 DIAGNOSIS — N80.9 ENDOMETRIOSIS: ICD-10-CM

## 2021-07-01 DIAGNOSIS — N92.1 MENORRHAGIA WITH IRREGULAR CYCLE: ICD-10-CM

## 2021-07-01 DIAGNOSIS — N70.11 HYDROSALPINX: Primary | ICD-10-CM

## 2021-07-01 PROCEDURE — 76856 US EXAM PELVIC COMPLETE: CPT | Mod: 26,,, | Performed by: RADIOLOGY

## 2021-07-01 PROCEDURE — 99214 OFFICE O/P EST MOD 30 MIN: CPT | Mod: S$PBB,,, | Performed by: OBSTETRICS & GYNECOLOGY

## 2021-07-01 PROCEDURE — 76856 US PELVIS COMP WITH TRANSVAG NON-OB (XPD): ICD-10-PCS | Mod: 26,,, | Performed by: RADIOLOGY

## 2021-07-01 PROCEDURE — 99213 OFFICE O/P EST LOW 20 MIN: CPT | Mod: PBBFAC,PN | Performed by: OBSTETRICS & GYNECOLOGY

## 2021-07-01 PROCEDURE — 76830 US PELVIS COMP WITH TRANSVAG NON-OB (XPD): ICD-10-PCS | Mod: 26,,, | Performed by: RADIOLOGY

## 2021-07-01 PROCEDURE — 99214 PR OFFICE/OUTPT VISIT, EST, LEVL IV, 30-39 MIN: ICD-10-PCS | Mod: S$PBB,,, | Performed by: OBSTETRICS & GYNECOLOGY

## 2021-07-01 PROCEDURE — 76830 TRANSVAGINAL US NON-OB: CPT | Mod: 26,,, | Performed by: RADIOLOGY

## 2021-07-01 PROCEDURE — 99999 PR PBB SHADOW E&M-EST. PATIENT-LVL III: ICD-10-PCS | Mod: PBBFAC,,, | Performed by: OBSTETRICS & GYNECOLOGY

## 2021-07-01 PROCEDURE — 99999 PR PBB SHADOW E&M-EST. PATIENT-LVL III: CPT | Mod: PBBFAC,,, | Performed by: OBSTETRICS & GYNECOLOGY

## 2021-07-01 RX ORDER — DROSPIRENONE 4 MG/1
4 TABLET, FILM COATED ORAL DAILY
Qty: 28 TABLET | Refills: 11 | Status: SHIPPED | OUTPATIENT
Start: 2021-07-01 | End: 2022-03-21 | Stop reason: SDUPTHER

## 2021-07-01 RX ORDER — ELAGOLIX 150 MG/1
150 TABLET, FILM COATED ORAL DAILY
Qty: 28 TABLET | Refills: 11 | Status: SHIPPED | OUTPATIENT
Start: 2021-07-01 | End: 2023-09-01 | Stop reason: ALTCHOICE

## 2021-07-01 RX ORDER — TRANEXAMIC ACID 650 MG/1
1300 TABLET ORAL 3 TIMES DAILY
Qty: 15 TABLET | Refills: 0 | Status: SHIPPED | OUTPATIENT
Start: 2021-07-01 | End: 2021-07-06

## 2021-07-07 ENCOUNTER — PATIENT MESSAGE (OUTPATIENT)
Dept: ADMINISTRATIVE | Facility: HOSPITAL | Age: 36
End: 2021-07-07

## 2021-07-16 ENCOUNTER — TELEPHONE (OUTPATIENT)
Dept: PHARMACY | Facility: CLINIC | Age: 36
End: 2021-07-16

## 2021-10-22 ENCOUNTER — OFFICE VISIT (OUTPATIENT)
Dept: OBSTETRICS AND GYNECOLOGY | Facility: CLINIC | Age: 36
End: 2021-10-22
Attending: OBSTETRICS & GYNECOLOGY
Payer: MEDICAID

## 2021-10-22 ENCOUNTER — APPOINTMENT (OUTPATIENT)
Dept: RADIOLOGY | Facility: CLINIC | Age: 36
End: 2021-10-22
Attending: OBSTETRICS & GYNECOLOGY
Payer: MEDICAID

## 2021-10-22 VITALS
SYSTOLIC BLOOD PRESSURE: 114 MMHG | HEIGHT: 66 IN | BODY MASS INDEX: 31.53 KG/M2 | DIASTOLIC BLOOD PRESSURE: 78 MMHG | WEIGHT: 196.19 LBS

## 2021-10-22 DIAGNOSIS — N70.11 HYDROSALPINX: Primary | ICD-10-CM

## 2021-10-22 DIAGNOSIS — N97.1 INFERTILITY OF TUBAL ORIGIN: ICD-10-CM

## 2021-10-22 DIAGNOSIS — N80.9 ENDOMETRIOSIS: ICD-10-CM

## 2021-10-22 DIAGNOSIS — N76.0 BV (BACTERIAL VAGINOSIS): ICD-10-CM

## 2021-10-22 DIAGNOSIS — B96.89 BV (BACTERIAL VAGINOSIS): ICD-10-CM

## 2021-10-22 DIAGNOSIS — N70.11 HYDROSALPINX: ICD-10-CM

## 2021-10-22 PROCEDURE — 99214 PR OFFICE/OUTPT VISIT, EST, LEVL IV, 30-39 MIN: ICD-10-PCS | Mod: S$PBB,,, | Performed by: OBSTETRICS & GYNECOLOGY

## 2021-10-22 PROCEDURE — 99213 OFFICE O/P EST LOW 20 MIN: CPT | Mod: PBBFAC,PN | Performed by: OBSTETRICS & GYNECOLOGY

## 2021-10-22 PROCEDURE — 76856 US EXAM PELVIC COMPLETE: CPT | Mod: 26,,, | Performed by: STUDENT IN AN ORGANIZED HEALTH CARE EDUCATION/TRAINING PROGRAM

## 2021-10-22 PROCEDURE — 99999 PR PBB SHADOW E&M-EST. PATIENT-LVL III: CPT | Mod: PBBFAC,,, | Performed by: OBSTETRICS & GYNECOLOGY

## 2021-10-22 PROCEDURE — 76856 US PELVIS COMP WITH TRANSVAG NON-OB (XPD): ICD-10-PCS | Mod: 26,,, | Performed by: STUDENT IN AN ORGANIZED HEALTH CARE EDUCATION/TRAINING PROGRAM

## 2021-10-22 PROCEDURE — 76830 TRANSVAGINAL US NON-OB: CPT | Mod: 26,,, | Performed by: STUDENT IN AN ORGANIZED HEALTH CARE EDUCATION/TRAINING PROGRAM

## 2021-10-22 PROCEDURE — 99214 OFFICE O/P EST MOD 30 MIN: CPT | Mod: S$PBB,,, | Performed by: OBSTETRICS & GYNECOLOGY

## 2021-10-22 PROCEDURE — 76830 US PELVIS COMP WITH TRANSVAG NON-OB (XPD): ICD-10-PCS | Mod: 26,,, | Performed by: STUDENT IN AN ORGANIZED HEALTH CARE EDUCATION/TRAINING PROGRAM

## 2021-10-22 PROCEDURE — 99999 PR PBB SHADOW E&M-EST. PATIENT-LVL III: ICD-10-PCS | Mod: PBBFAC,,, | Performed by: OBSTETRICS & GYNECOLOGY

## 2021-10-22 RX ORDER — METRONIDAZOLE 500 MG/1
500 TABLET ORAL 2 TIMES DAILY
Qty: 14 TABLET | Refills: 0 | Status: SHIPPED | OUTPATIENT
Start: 2021-10-22 | End: 2021-10-29

## 2021-11-27 ENCOUNTER — PATIENT MESSAGE (OUTPATIENT)
Dept: OBSTETRICS AND GYNECOLOGY | Facility: CLINIC | Age: 36
End: 2021-11-27
Payer: MEDICAID

## 2021-11-27 DIAGNOSIS — N76.0 BV (BACTERIAL VAGINOSIS): Primary | ICD-10-CM

## 2021-11-27 DIAGNOSIS — B96.89 BV (BACTERIAL VAGINOSIS): Primary | ICD-10-CM

## 2021-11-29 ENCOUNTER — PATIENT MESSAGE (OUTPATIENT)
Dept: OBSTETRICS AND GYNECOLOGY | Facility: CLINIC | Age: 36
End: 2021-11-29
Payer: MEDICAID

## 2021-11-29 RX ORDER — TINIDAZOLE 500 MG/1
2 TABLET ORAL DAILY
Qty: 8 TABLET | Refills: 0 | Status: SHIPPED | OUTPATIENT
Start: 2021-11-29 | End: 2021-12-01

## 2021-12-30 ENCOUNTER — TELEPHONE (OUTPATIENT)
Dept: OBSTETRICS AND GYNECOLOGY | Facility: CLINIC | Age: 36
End: 2021-12-30
Payer: MEDICAID

## 2022-01-03 ENCOUNTER — OFFICE VISIT (OUTPATIENT)
Dept: OBSTETRICS AND GYNECOLOGY | Facility: CLINIC | Age: 37
End: 2022-01-03
Attending: OBSTETRICS & GYNECOLOGY
Payer: MEDICAID

## 2022-01-03 VITALS
WEIGHT: 198.44 LBS | SYSTOLIC BLOOD PRESSURE: 126 MMHG | HEIGHT: 66 IN | BODY MASS INDEX: 31.89 KG/M2 | DIASTOLIC BLOOD PRESSURE: 70 MMHG

## 2022-01-03 DIAGNOSIS — N89.8 VAGINAL ODOR: ICD-10-CM

## 2022-01-03 DIAGNOSIS — N89.8 VAGINAL DISCHARGE: ICD-10-CM

## 2022-01-03 DIAGNOSIS — Z01.419 ENCOUNTER FOR GYNECOLOGICAL EXAMINATION: Primary | ICD-10-CM

## 2022-01-03 PROCEDURE — 3074F PR MOST RECENT SYSTOLIC BLOOD PRESSURE < 130 MM HG: ICD-10-PCS | Mod: CPTII,,, | Performed by: OBSTETRICS & GYNECOLOGY

## 2022-01-03 PROCEDURE — 99395 PR PREVENTIVE VISIT,EST,18-39: ICD-10-PCS | Mod: S$PBB,,, | Performed by: OBSTETRICS & GYNECOLOGY

## 2022-01-03 PROCEDURE — 87491 CHLMYD TRACH DNA AMP PROBE: CPT | Performed by: OBSTETRICS & GYNECOLOGY

## 2022-01-03 PROCEDURE — 99395 PREV VISIT EST AGE 18-39: CPT | Mod: S$PBB,,, | Performed by: OBSTETRICS & GYNECOLOGY

## 2022-01-03 PROCEDURE — 1159F MED LIST DOCD IN RCRD: CPT | Mod: CPTII,,, | Performed by: OBSTETRICS & GYNECOLOGY

## 2022-01-03 PROCEDURE — 87481 CANDIDA DNA AMP PROBE: CPT | Mod: 59 | Performed by: OBSTETRICS & GYNECOLOGY

## 2022-01-03 PROCEDURE — 3078F DIAST BP <80 MM HG: CPT | Mod: CPTII,,, | Performed by: OBSTETRICS & GYNECOLOGY

## 2022-01-03 PROCEDURE — 99213 OFFICE O/P EST LOW 20 MIN: CPT | Mod: PBBFAC,PN | Performed by: OBSTETRICS & GYNECOLOGY

## 2022-01-03 PROCEDURE — 87801 DETECT AGNT MULT DNA AMPLI: CPT | Performed by: OBSTETRICS & GYNECOLOGY

## 2022-01-03 PROCEDURE — 99999 PR PBB SHADOW E&M-EST. PATIENT-LVL III: CPT | Mod: PBBFAC,,, | Performed by: OBSTETRICS & GYNECOLOGY

## 2022-01-03 PROCEDURE — 87591 N.GONORRHOEAE DNA AMP PROB: CPT | Mod: 59 | Performed by: OBSTETRICS & GYNECOLOGY

## 2022-01-03 PROCEDURE — 3008F PR BODY MASS INDEX (BMI) DOCUMENTED: ICD-10-PCS | Mod: CPTII,,, | Performed by: OBSTETRICS & GYNECOLOGY

## 2022-01-03 PROCEDURE — 3008F BODY MASS INDEX DOCD: CPT | Mod: CPTII,,, | Performed by: OBSTETRICS & GYNECOLOGY

## 2022-01-03 PROCEDURE — 3078F PR MOST RECENT DIASTOLIC BLOOD PRESSURE < 80 MM HG: ICD-10-PCS | Mod: CPTII,,, | Performed by: OBSTETRICS & GYNECOLOGY

## 2022-01-03 PROCEDURE — 3074F SYST BP LT 130 MM HG: CPT | Mod: CPTII,,, | Performed by: OBSTETRICS & GYNECOLOGY

## 2022-01-03 PROCEDURE — 99999 PR PBB SHADOW E&M-EST. PATIENT-LVL III: ICD-10-PCS | Mod: PBBFAC,,, | Performed by: OBSTETRICS & GYNECOLOGY

## 2022-01-03 PROCEDURE — 1159F PR MEDICATION LIST DOCUMENTED IN MEDICAL RECORD: ICD-10-PCS | Mod: CPTII,,, | Performed by: OBSTETRICS & GYNECOLOGY

## 2022-01-03 NOTE — PROGRESS NOTES
Chief Complaint: well woman exam      She is established    Syed Lyon is a 36 y.o. female  presents for a well woman exam.    Doing well  C/o vaginal discharge creamy with an odor for the past week   No cycles on OCP and Orlissa   Getting ready to do IVF again with Dr Santana - instructed her to remind Max to dc Orlissa  Sched for Sonohyst in a few weeks   Has hx bilateral tubal obstruction  And had IVF first preg with Max   U/s 10/21 Stable appearing right adnexal cystic tubular structure favored to represent hydrosalpinx.    ROS:  No abdominal pain. No vaginal discharge  No breast pain or masses, No rectal bleeding       Cycles: regular and monthly  LMP: No LMP recorded.  Contraception: The current method of family planning is OCP (estrogen/progesterone).  Meds per MD: Orlissa and OCP    Last pap: 2019 Normal and HPV neg   Last MMG:  None      Past Medical History:   Diagnosis Date    Abnormal Pap smear of cervix     cryo yrs. ago, then 3- hpv positive, pap normal, 2017 colpo ECC normal,     Endometriosis     per dx lap Bad endo per pt    HPV (human papilloma virus) infection     Infertility, female         Oral contraceptive use     PID (pelvic inflammatory disease)        Past Surgical History:   Procedure Laterality Date     SECTION      COLONOSCOPY N/A 2017    Procedure: COLONOSCOPY;  Surgeon: Jose Zaidi MD;  Location: [facility] ([address] [place] [address]);  Service: Endoscopy;  Laterality: N/A;    fallopian tube removal Bilateral     GYNECOLOGIC CRYOSURGERY      HYSTEROSCOPY      2015    In Vitro      Dr Anuja Santana       OB History    Para Term  AB Living   1 1   1   1   SAB IAB Ectopic Multiple Live Births         0 1      # Outcome Date GA Lbr Dustin/2nd Weight Sex Delivery Anes PTL Lv   1  16 35w5d  2.608 kg (5 lb 12 oz) F CS-LTranv Spinal N OCTAVIANO       Family History   Problem Relation Age of Onset    Heart disease Father   "   No Known Problems Mother     No Known Problems Daughter     Meningitis Brother     Breast cancer Neg Hx     Colon cancer Neg Hx     Ovarian cancer Neg Hx     Cancer Neg Hx     Colon polyps Neg Hx     Celiac disease Neg Hx     Esophageal cancer Neg Hx     Stomach cancer Neg Hx     Irritable bowel syndrome Neg Hx     Inflammatory bowel disease Neg Hx        Social History     Tobacco Use    Smoking status: Never Smoker    Smokeless tobacco: Never Used   Substance Use Topics    Alcohol use: Yes     Comment: occasional    Drug use: No           Physical Exam:  /70   Ht 5' 6" (1.676 m)   Wt 90 kg (198 lb 6.6 oz)   BMI 32.02 kg/m²     APPEARANCE: Well nourished, well developed, in no acute distress.  AFFECT: WNL, alert and oriented x 3  SKIN: No acne or hirsutism  BREASTS: Symmetrical, no skin changes.                      No nipple discharge.   No palpable masses bilaterally  NODES: No inguinal nor axillary LAD  ABDOMEN: soft Non tender Non distended No masses  PELVIC:   Normal external genitalia without lesions.  Normal hair distribution.   Adequate perineal body, normal urethral meatus.   No signif cystocele or rectocele.  Vagina moist and well rugated without lesions or discharge.    Cervix pink, without lesions, discharge or tenderness.     PAP not performed   Affirm performed  Bimanual exam shows uterus to be normal size, regular, mobile and nontender.    Adnexa without masses or tenderness.    EXTREMITIES: No edema.        ASSESSMENT AND PLAN  Diagnoses and all orders for this visit:    Encounter for gynecological examination    Vaginal odor  -     Vaginosis Screen by DNA Probe  -     C. trachomatis/N. gonorrhoeae by AMP DNA    Vaginal discharge  -     Vaginosis Screen by DNA Probe  -     C. trachomatis/N. gonorrhoeae by AMP DNA        Follow up in about 1 year (around 1/3/2023) for annual.     Patient was counseled today on A.C.S. Pap guidelines and recommendations for yearly pelvic " exams, mammograms and monthly self breast exams; to see her PCP for other health maintenance.     Patient encouraged to register for portal and results will be sent via portal.       Health Maintenance   Topic Date Due    TETANUS VACCINE  Never done    Pap Smear  09/25/2020    Hepatitis C Screening  Completed    Lipid Panel  Completed

## 2022-01-07 LAB
BACTERIAL VAGINOSIS DNA: POSITIVE
C TRACH DNA SPEC QL NAA+PROBE: NOT DETECTED
CANDIDA GLABRATA DNA: NEGATIVE
CANDIDA KRUSEI DNA: NEGATIVE
CANDIDA RRNA VAG QL PROBE: POSITIVE
N GONORRHOEA DNA SPEC QL NAA+PROBE: NOT DETECTED
T VAGINALIS RRNA GENITAL QL PROBE: NEGATIVE

## 2022-01-08 ENCOUNTER — PATIENT MESSAGE (OUTPATIENT)
Dept: OBSTETRICS AND GYNECOLOGY | Facility: CLINIC | Age: 37
End: 2022-01-08
Payer: MEDICAID

## 2022-01-08 RX ORDER — METRONIDAZOLE 500 MG/1
500 TABLET ORAL 2 TIMES DAILY
Qty: 14 TABLET | Refills: 0 | Status: SHIPPED | OUTPATIENT
Start: 2022-01-08 | End: 2022-01-15

## 2022-01-08 RX ORDER — FLUCONAZOLE 150 MG/1
150 TABLET ORAL DAILY
Qty: 2 TABLET | Refills: 0 | Status: SHIPPED | OUTPATIENT
Start: 2022-01-08 | End: 2022-01-10

## 2022-01-09 ENCOUNTER — PATIENT MESSAGE (OUTPATIENT)
Dept: OBSTETRICS AND GYNECOLOGY | Facility: CLINIC | Age: 37
End: 2022-01-09
Payer: MEDICAID

## 2022-01-09 NOTE — PROGRESS NOTES
Syed,   Your vaginal swab cane back positive for 2 things---yeast and bacterial vaginosis (BV)  which explains the discharge you are having.  I am sending in flagyl an antibiotic to take 2 x a day for 7 days to treat the BV and a  Diflucan tablet to treat the yeast. Take both as directed and avoid alcohol for a week.  I sent both Rx to your pharmacy.  Let me know if you have any questions.    Dr Winters

## 2022-02-10 NOTE — TELEPHONE ENCOUNTER
Contacted patient about her lab result. Will start ferrous sulfate 325mg BID.C heck CBC in 3 months. Keep gastro appt. She states her menstrual periods are moderately heavy, advised her to follow up with her gyn about that   Spoke to patient. States he woke up today with some mild congestion, body aches, and a temp of 99 degrees. States that he took a home covid test today and it was positive. States his wife's test was negative. Patient was wondering if he should go get a PCR test. Writer informed the patient that is completely up to him but it would not be a bad idea for him and his wife to go get a PCR test. Writer advised patient that if he experiences any SOB or chest pain, he should go be evaluated in the ED. Verbalized understanding.     Patient has an INR test yesterday so writer is forwarding this message to the [facility] nurse to make sure there are no specific guidelines or changes in dosing/testing.     Reason for Disposition  • [1] COVID-19 diagnosed by positive lab test AND [2] mild symptoms (e.g., cough, fever, others) AND [3] no complications or SOB    Protocols used: CORONAVIRUS (COVID-19) DIAGNOSED OR [id number]

## 2022-03-16 ENCOUNTER — PATIENT MESSAGE (OUTPATIENT)
Dept: ADMINISTRATIVE | Facility: HOSPITAL | Age: 37
End: 2022-03-16
Payer: MEDICAID

## 2022-03-21 ENCOUNTER — PATIENT MESSAGE (OUTPATIENT)
Dept: OBSTETRICS AND GYNECOLOGY | Facility: CLINIC | Age: 37
End: 2022-03-21
Payer: MEDICAID

## 2022-03-21 DIAGNOSIS — N92.1 MENORRHAGIA WITH IRREGULAR CYCLE: ICD-10-CM

## 2022-03-21 DIAGNOSIS — N80.9 ENDOMETRIOSIS: ICD-10-CM

## 2022-03-21 RX ORDER — DROSPIRENONE 4 MG/1
4 TABLET, FILM COATED ORAL DAILY
Qty: 28 TABLET | Refills: 11 | Status: SHIPPED | OUTPATIENT
Start: 2022-03-21 | End: 2022-09-19

## 2022-04-29 NOTE — TELEPHONE ENCOUNTER
Please contact patient and let her know her blood count has improved, but she is still mildly anemic. Please advise her to continue her iron supplement, and keep her gynecology appt as scheduled.    Please schedule EP appt with me in 3 months  
Spoke with pt and she stated verbalized understanding; and appt scheduled.  
100

## 2022-06-27 ENCOUNTER — OFFICE VISIT (OUTPATIENT)
Dept: INTERNAL MEDICINE | Facility: CLINIC | Age: 37
End: 2022-06-27
Payer: MEDICAID

## 2022-06-27 DIAGNOSIS — Z00.00 PREVENTATIVE HEALTH CARE: ICD-10-CM

## 2022-06-27 DIAGNOSIS — E66.9 OBESITY, UNSPECIFIED CLASSIFICATION, UNSPECIFIED OBESITY TYPE, UNSPECIFIED WHETHER SERIOUS COMORBIDITY PRESENT: Primary | ICD-10-CM

## 2022-06-27 DIAGNOSIS — B35.1 ONYCHOMYCOSIS: ICD-10-CM

## 2022-06-27 PROCEDURE — 1159F PR MEDICATION LIST DOCUMENTED IN MEDICAL RECORD: ICD-10-PCS | Mod: CPTII,,, | Performed by: INTERNAL MEDICINE

## 2022-06-27 PROCEDURE — 3008F PR BODY MASS INDEX (BMI) DOCUMENTED: ICD-10-PCS | Mod: CPTII,,, | Performed by: INTERNAL MEDICINE

## 2022-06-27 PROCEDURE — 3008F BODY MASS INDEX DOCD: CPT | Mod: CPTII,,, | Performed by: INTERNAL MEDICINE

## 2022-06-27 PROCEDURE — 3078F PR MOST RECENT DIASTOLIC BLOOD PRESSURE < 80 MM HG: ICD-10-PCS | Mod: CPTII,,, | Performed by: INTERNAL MEDICINE

## 2022-06-27 PROCEDURE — 99214 OFFICE O/P EST MOD 30 MIN: CPT | Mod: PBBFAC | Performed by: INTERNAL MEDICINE

## 2022-06-27 PROCEDURE — 3078F DIAST BP <80 MM HG: CPT | Mod: CPTII,,, | Performed by: INTERNAL MEDICINE

## 2022-06-27 PROCEDURE — 99395 PREV VISIT EST AGE 18-39: CPT | Mod: S$PBB,,, | Performed by: INTERNAL MEDICINE

## 2022-06-27 PROCEDURE — 1159F MED LIST DOCD IN RCRD: CPT | Mod: CPTII,,, | Performed by: INTERNAL MEDICINE

## 2022-06-27 PROCEDURE — 3074F PR MOST RECENT SYSTOLIC BLOOD PRESSURE < 130 MM HG: ICD-10-PCS | Mod: CPTII,,, | Performed by: INTERNAL MEDICINE

## 2022-06-27 PROCEDURE — 99999 PR PBB SHADOW E&M-EST. PATIENT-LVL IV: ICD-10-PCS | Mod: PBBFAC,,, | Performed by: INTERNAL MEDICINE

## 2022-06-27 PROCEDURE — 99999 PR PBB SHADOW E&M-EST. PATIENT-LVL IV: CPT | Mod: PBBFAC,,, | Performed by: INTERNAL MEDICINE

## 2022-06-27 PROCEDURE — 99395 PR PREVENTIVE VISIT,EST,18-39: ICD-10-PCS | Mod: S$PBB,,, | Performed by: INTERNAL MEDICINE

## 2022-06-27 PROCEDURE — 3074F SYST BP LT 130 MM HG: CPT | Mod: CPTII,,, | Performed by: INTERNAL MEDICINE

## 2022-06-27 RX ORDER — KETOCONAZOLE 20 MG/G
CREAM TOPICAL DAILY PRN
Qty: 15 G | Refills: 1 | Status: SHIPPED | OUTPATIENT
Start: 2022-06-27 | End: 2023-03-01

## 2022-06-27 RX ORDER — HYDROXYZINE HYDROCHLORIDE 25 MG/1
25 TABLET, FILM COATED ORAL DAILY PRN
Qty: 30 TABLET | Refills: 2 | Status: SHIPPED | OUTPATIENT
Start: 2022-06-27 | End: 2022-09-22

## 2022-06-28 ENCOUNTER — LAB VISIT (OUTPATIENT)
Dept: LAB | Facility: HOSPITAL | Age: 37
End: 2022-06-28
Attending: INTERNAL MEDICINE
Payer: MEDICAID

## 2022-06-28 DIAGNOSIS — Z00.00 PREVENTATIVE HEALTH CARE: ICD-10-CM

## 2022-06-28 LAB
ALBUMIN SERPL BCP-MCNC: 4 G/DL (ref 3.5–5.2)
ALP SERPL-CCNC: 54 U/L (ref 55–135)
ALT SERPL W/O P-5'-P-CCNC: 10 U/L (ref 10–44)
ANION GAP SERPL CALC-SCNC: 14 MMOL/L (ref 8–16)
AST SERPL-CCNC: 14 U/L (ref 10–40)
BASOPHILS # BLD AUTO: 0.03 K/UL (ref 0–0.2)
BASOPHILS NFR BLD: 0.6 % (ref 0–1.9)
BILIRUB SERPL-MCNC: 1.1 MG/DL (ref 0.1–1)
BUN SERPL-MCNC: 16 MG/DL (ref 6–20)
CALCIUM SERPL-MCNC: 9.7 MG/DL (ref 8.7–10.5)
CHLORIDE SERPL-SCNC: 107 MMOL/L (ref 95–110)
CHOLEST SERPL-MCNC: 154 MG/DL (ref 120–199)
CHOLEST/HDLC SERPL: 2.8 {RATIO} (ref 2–5)
CO2 SERPL-SCNC: 21 MMOL/L (ref 23–29)
CREAT SERPL-MCNC: 0.8 MG/DL (ref 0.5–1.4)
DIFFERENTIAL METHOD: ABNORMAL
EOSINOPHIL # BLD AUTO: 0.3 K/UL (ref 0–0.5)
EOSINOPHIL NFR BLD: 5.5 % (ref 0–8)
ERYTHROCYTE [DISTWIDTH] IN BLOOD BY AUTOMATED COUNT: 15.4 % (ref 11.5–14.5)
EST. GFR  (AFRICAN AMERICAN): >60 ML/MIN/1.73 M^2
EST. GFR  (NON AFRICAN AMERICAN): >60 ML/MIN/1.73 M^2
GLUCOSE SERPL-MCNC: 94 MG/DL (ref 70–110)
HCT VFR BLD AUTO: 40.8 % (ref 37–48.5)
HDLC SERPL-MCNC: 56 MG/DL (ref 40–75)
HDLC SERPL: 36.4 % (ref 20–50)
HGB BLD-MCNC: 12 G/DL (ref 12–16)
IMM GRANULOCYTES # BLD AUTO: 0.04 K/UL (ref 0–0.04)
IMM GRANULOCYTES NFR BLD AUTO: 0.8 % (ref 0–0.5)
LDLC SERPL CALC-MCNC: 85.6 MG/DL (ref 63–159)
LYMPHOCYTES # BLD AUTO: 1.7 K/UL (ref 1–4.8)
LYMPHOCYTES NFR BLD: 33.9 % (ref 18–48)
MCH RBC QN AUTO: 22.6 PG (ref 27–31)
MCHC RBC AUTO-ENTMCNC: 29.4 G/DL (ref 32–36)
MCV RBC AUTO: 77 FL (ref 82–98)
MONOCYTES # BLD AUTO: 0.5 K/UL (ref 0.3–1)
MONOCYTES NFR BLD: 9.8 % (ref 4–15)
NEUTROPHILS # BLD AUTO: 2.5 K/UL (ref 1.8–7.7)
NEUTROPHILS NFR BLD: 49.4 % (ref 38–73)
NONHDLC SERPL-MCNC: 98 MG/DL
NRBC BLD-RTO: 0 /100 WBC
PLATELET # BLD AUTO: 193 K/UL (ref 150–450)
PMV BLD AUTO: 12.9 FL (ref 9.2–12.9)
POTASSIUM SERPL-SCNC: 4.9 MMOL/L (ref 3.5–5.1)
PROT SERPL-MCNC: 7.6 G/DL (ref 6–8.4)
RBC # BLD AUTO: 5.31 M/UL (ref 4–5.4)
SODIUM SERPL-SCNC: 142 MMOL/L (ref 136–145)
T4 FREE SERPL-MCNC: 1.03 NG/DL (ref 0.71–1.51)
TRIGL SERPL-MCNC: 62 MG/DL (ref 30–150)
TSH SERPL DL<=0.005 MIU/L-ACNC: 0.06 UIU/ML (ref 0.4–4)
WBC # BLD AUTO: 5.11 K/UL (ref 3.9–12.7)

## 2022-06-28 PROCEDURE — 84439 ASSAY OF FREE THYROXINE: CPT | Performed by: INTERNAL MEDICINE

## 2022-06-28 PROCEDURE — 36415 COLL VENOUS BLD VENIPUNCTURE: CPT | Performed by: INTERNAL MEDICINE

## 2022-06-28 PROCEDURE — 84443 ASSAY THYROID STIM HORMONE: CPT | Performed by: INTERNAL MEDICINE

## 2022-06-28 PROCEDURE — 85025 COMPLETE CBC W/AUTO DIFF WBC: CPT | Performed by: INTERNAL MEDICINE

## 2022-06-28 PROCEDURE — 80053 COMPREHEN METABOLIC PANEL: CPT | Performed by: INTERNAL MEDICINE

## 2022-06-28 PROCEDURE — 80061 LIPID PANEL: CPT | Performed by: INTERNAL MEDICINE

## 2022-07-02 VITALS
BODY MASS INDEX: 33.59 KG/M2 | DIASTOLIC BLOOD PRESSURE: 60 MMHG | TEMPERATURE: 98 F | OXYGEN SATURATION: 99 % | HEART RATE: 93 BPM | HEIGHT: 66 IN | WEIGHT: 209 LBS | SYSTOLIC BLOOD PRESSURE: 112 MMHG

## 2022-07-02 NOTE — PROGRESS NOTES
Subjective:       Patient ID: Syed Lyon is a 36 y.o. female.    Chief Complaint: Annual Exam    HPI  She is here for annual exam.  Currently without complaint    Past medical history:  Iron deficiency anemia    Medications:   birth control pills    No known drug allergies      Review of Systems   Constitutional: Negative for chills, fatigue, fever and unexpected weight change.   Respiratory: Negative for chest tightness and shortness of breath.    Cardiovascular: Negative for chest pain and palpitations.   Gastrointestinal: Negative for abdominal pain and blood in stool.   Neurological: Negative for dizziness, syncope, numbness and headaches.       Objective:      Physical Exam  HENT:      Right Ear: External ear normal.      Left Ear: External ear normal.      Nose: Nose normal.      Mouth/Throat:      Mouth: Mucous membranes are moist.      Pharynx: Oropharynx is clear.   Eyes:      Pupils: Pupils are equal, round, and reactive to light.   Cardiovascular:      Rate and Rhythm: Normal rate and regular rhythm.      Heart sounds: No murmur heard.  Pulmonary:      Breath sounds: Normal breath sounds.   Chest:   Breasts:      Right: No axillary adenopathy.      Left: No axillary adenopathy.       Abdominal:      General: There is no distension.      Palpations: There is no hepatomegaly or splenomegaly.      Tenderness: There is no abdominal tenderness.   Musculoskeletal:      Cervical back: Normal range of motion.   Lymphadenopathy:      Cervical: No cervical adenopathy.      Upper Body:      Right upper body: No axillary adenopathy.      Left upper body: No axillary adenopathy.   Neurological:      Cranial Nerves: No cranial nerve deficit.      Sensory: No sensory deficit.      Motor: Motor function is intact.      Deep Tendon Reflexes: Reflexes are normal and symmetric.         Assessment/Plan       Assessment and plan:  Annual exam.  Check CMP, lipid panel, CBC, TSH

## 2022-07-11 ENCOUNTER — PATIENT MESSAGE (OUTPATIENT)
Dept: ADMINISTRATIVE | Facility: HOSPITAL | Age: 37
End: 2022-07-11
Payer: COMMERCIAL

## 2022-09-21 ENCOUNTER — PATIENT MESSAGE (OUTPATIENT)
Dept: OBSTETRICS AND GYNECOLOGY | Facility: CLINIC | Age: 37
End: 2022-09-21
Payer: COMMERCIAL

## 2022-09-22 RX ORDER — HYDROCORTISONE ACETATE PRAMOXINE HCL 2.5; 1 G/100G; G/100G
CREAM TOPICAL 3 TIMES DAILY
Qty: 30 G | Refills: 0 | Status: ON HOLD | OUTPATIENT
Start: 2022-09-22 | End: 2022-12-07 | Stop reason: HOSPADM

## 2022-09-22 RX ORDER — VALACYCLOVIR HYDROCHLORIDE 1 G/1
1000 TABLET, FILM COATED ORAL 2 TIMES DAILY
Qty: 14 TABLET | Refills: 5 | Status: SHIPPED | OUTPATIENT
Start: 2022-09-22 | End: 2022-10-18

## 2022-09-28 ENCOUNTER — TELEPHONE (OUTPATIENT)
Dept: INTERNAL MEDICINE | Facility: CLINIC | Age: 37
End: 2022-09-28
Payer: COMMERCIAL

## 2022-09-28 DIAGNOSIS — K62.5 RECTAL BLEED: Primary | ICD-10-CM

## 2022-09-28 DIAGNOSIS — K62.5 RECTAL BLEEDING: Primary | ICD-10-CM

## 2022-09-28 NOTE — TELEPHONE ENCOUNTER
----- Message from Aida Martins sent at 9/28/2022  4:37 PM CDT -----  Epic is not generating an available appt for Colorectal Surgery throughout the [facility] area. Pt may need an external referral.    Thanks

## 2022-09-28 NOTE — TELEPHONE ENCOUNTER
----- Message from Dank Stack sent at 9/28/2022  8:14 AM CDT -----  Contact: Pt  Type:  Needs Medical Advice    Who Called: Pt   Symptoms (please be specific): My bottom every time I have a bowel movement it hurts really really bad. I am bleeding each time. Makes me not want to have one please help.  How long has patient had these symptoms:  n/a  Would the patient rather a call back or a response via [facility]? Call/ [employer]  Best Call Back Number: [phone]  Additional Information: Books were closed pt prefers to be seen on next week

## 2022-10-10 ENCOUNTER — PATIENT MESSAGE (OUTPATIENT)
Dept: ADMINISTRATIVE | Facility: HOSPITAL | Age: 37
End: 2022-10-10
Payer: COMMERCIAL

## 2022-10-17 ENCOUNTER — TELEPHONE (OUTPATIENT)
Dept: SURGERY | Facility: CLINIC | Age: 37
End: 2022-10-17
Payer: COMMERCIAL

## 2022-10-18 ENCOUNTER — OFFICE VISIT (OUTPATIENT)
Dept: SURGERY | Facility: CLINIC | Age: 37
End: 2022-10-18
Payer: COMMERCIAL

## 2022-10-18 VITALS
HEART RATE: 80 BPM | WEIGHT: 212.5 LBS | BODY MASS INDEX: 34.15 KG/M2 | HEIGHT: 66 IN | SYSTOLIC BLOOD PRESSURE: 121 MMHG | DIASTOLIC BLOOD PRESSURE: 68 MMHG

## 2022-10-18 DIAGNOSIS — K60.2 ANAL FISSURE: Primary | ICD-10-CM

## 2022-10-18 PROCEDURE — 99999 PR PBB SHADOW E&M-EST. PATIENT-LVL IV: ICD-10-PCS | Mod: PBBFAC,,, | Performed by: NURSE PRACTITIONER

## 2022-10-18 PROCEDURE — 3074F PR MOST RECENT SYSTOLIC BLOOD PRESSURE < 130 MM HG: ICD-10-PCS | Mod: CPTII,S$GLB,, | Performed by: NURSE PRACTITIONER

## 2022-10-18 PROCEDURE — 3078F PR MOST RECENT DIASTOLIC BLOOD PRESSURE < 80 MM HG: ICD-10-PCS | Mod: CPTII,S$GLB,, | Performed by: NURSE PRACTITIONER

## 2022-10-18 PROCEDURE — 3074F SYST BP LT 130 MM HG: CPT | Mod: CPTII,S$GLB,, | Performed by: NURSE PRACTITIONER

## 2022-10-18 PROCEDURE — 1159F MED LIST DOCD IN RCRD: CPT | Mod: CPTII,S$GLB,, | Performed by: NURSE PRACTITIONER

## 2022-10-18 PROCEDURE — 99203 PR OFFICE/OUTPT VISIT, NEW, LEVL III, 30-44 MIN: ICD-10-PCS | Mod: S$GLB,,, | Performed by: NURSE PRACTITIONER

## 2022-10-18 PROCEDURE — 99203 OFFICE O/P NEW LOW 30 MIN: CPT | Mod: S$GLB,,, | Performed by: NURSE PRACTITIONER

## 2022-10-18 PROCEDURE — 1160F RVW MEDS BY RX/DR IN RCRD: CPT | Mod: CPTII,S$GLB,, | Performed by: NURSE PRACTITIONER

## 2022-10-18 PROCEDURE — 1160F PR REVIEW ALL MEDS BY PRESCRIBER/CLIN PHARMACIST DOCUMENTED: ICD-10-PCS | Mod: CPTII,S$GLB,, | Performed by: NURSE PRACTITIONER

## 2022-10-18 PROCEDURE — 1159F PR MEDICATION LIST DOCUMENTED IN MEDICAL RECORD: ICD-10-PCS | Mod: CPTII,S$GLB,, | Performed by: NURSE PRACTITIONER

## 2022-10-18 PROCEDURE — 3078F DIAST BP <80 MM HG: CPT | Mod: CPTII,S$GLB,, | Performed by: NURSE PRACTITIONER

## 2022-10-18 PROCEDURE — 99999 PR PBB SHADOW E&M-EST. PATIENT-LVL IV: CPT | Mod: PBBFAC,,, | Performed by: NURSE PRACTITIONER

## 2022-10-18 NOTE — PROGRESS NOTES
"CRS Office Visit History and Physical    Referring Md:   Mariah Livingston Md  [address] [place] [address]    SUBJECTIVE:     Chief Complaint: anal pain    History of Present Illness:  The patient is new patient to this practice.   Course is as follows:  Patient is a 36 y.o. female presents with "excruciating" sharp anal pain with bm. Lingers after a bm. Makes her not want to have a bm.  Can bleed.   Symptoms have been present for 5-6 months.  Has tried hemorrhoid creams without improvement.  Associated bleeding: yes  Previous anorectal procedures: no  confirms straining/prolonged time on toilet with bowel movements.  is not currently taking fiber supplement or stool softener. Bm daily or QOD. Really hard vs really soft stools.   Blood thinners: No    Last Colonoscopy completed on 2017  - normal  - repeat at 46  Family history of colorectal cancer or IBD: none.    Review of patient's allergies indicates:   Allergen Reactions    Broccoli flower Hives, Itching and Swelling    Mold Hives, Itching and Swelling    Mustard Hives, Itching and Swelling    Shellfish containing products Hives, Itching and Swelling    Soy Hives, Itching and Swelling    Strawberry Hives, Itching and Swelling       Past Medical History:   Diagnosis Date    Abnormal Pap smear of cervix     cryo yrs. ago, then 3- hpv positive, pap normal, 2017 colpo ECC normal,     Endometriosis     per dx lap Bad endo per pt    HPV (human papilloma virus) infection     Infertility, female         Oral contraceptive use     PID (pelvic inflammatory disease)      Past Surgical History:   Procedure Laterality Date     SECTION      COLONOSCOPY N/A 2017    Procedure: COLONOSCOPY;  Surgeon: Jose Zaidi MD;  Location: [address];  Service: Endoscopy;  Laterality: N/A;    fallopian tube removal Bilateral     GYNECOLOGIC CRYOSURGERY      HYSTEROSCOPY      2015    In Vitro      Dr Anuja Santana " "    Family History   Problem Relation Age of Onset    Heart disease Father     No Known Problems Mother     No Known Problems Daughter     Meningitis Brother     Breast cancer Neg Hx     Colon cancer Neg Hx     Ovarian cancer Neg Hx     Cancer Neg Hx     Colon polyps Neg Hx     Celiac disease Neg Hx     Esophageal cancer Neg Hx     Stomach cancer Neg Hx     Irritable bowel syndrome Neg Hx     Inflammatory bowel disease Neg Hx      Social History     Tobacco Use    Smoking status: Never    Smokeless tobacco: Never   Substance Use Topics    Alcohol use: Yes     Comment: occasional    Drug use: No        Review of Systems:  Review of Systems   Gastrointestinal:  Positive for blood in stool and constipation.     OBJECTIVE:     Vital Signs (Most Recent)  Blood Pressure 121/68 (BP Location: Left arm, Patient Position: Sitting, BP Method: Large (Automatic))   Pulse 80   Height 5' 6" (1.676 m)   Weight 96.4 kg (212 lb 8.4 oz)   Body Mass Index 34.30 kg/m²     Physical Exam:  General: Black or  female in no distress   Neuro: Alert and oriented to person, place, and time.  Moves all extremities.     HEENT: No icterus.  Trachea midline  Respiratory: Respirations are even and unlabored, no cough or audible wheezing  Skin: Warm dry and intact, No visible rashes, no jaundice    Labs reviewed today:  Lab Results   Component Value Date    WBC 5.11 06/28/2022    HGB 12.0 06/28/2022    HCT 40.8 06/28/2022     06/28/2022    CHOL 154 06/28/2022    TRIG 62 06/28/2022    HDL 56 06/28/2022    ALT 10 06/28/2022    AST 14 06/28/2022     06/28/2022    K 4.9 06/28/2022     06/28/2022    CREATININE 0.8 06/28/2022    BUN 16 06/28/2022    CO2 21 (L) 06/28/2022    TSH 0.064 (L) 06/28/2022    INR 1.1 10/01/2016       Imaging reviewed today:  none    Endoscopy reviewed today:  none    Anorectal Exam:    Anal Skin:  midline anterior anal fissure; slightly off center to left    Digital Rectal " Exam:  deferred    ASSESSMENT/PLAN:     Diagnoses and all orders for this visit:    Anal fissure  -     diltiazem HCl (DILTIAZEM 2% CREAM); Apply topically 3 (three) times daily. Apply topically to anal area.        The patient was instructed to:  Diltiazem cream tid  Lidocaine prn  Increase water intake to at least 8-10 glasses of water per day.  Take a daily fiber supplement (Konsyl, Benefiber, Metamucil) and increase dietary intake to 20-30 grams/day.  Avoid straining or spending >5min/event with bowel movements.  Follow-up in clinic prn with MD if no improvement.      Amairani Santizo, ELLIOT-LENORE  Colon and Rectal Surgery

## 2022-10-18 NOTE — PATIENT INSTRUCTIONS
Daily fiber supplement. Fibercon, Citrucel, Metamucil.   Water intake > 60 oz/day  Warm soaks  Diltiazem cream 3x/day. Sent to [facility]. Other compounding pharmacies are [facility], [facility], [place] [facility]  If no improvement in 2-3 weeks let me know for follow up with MD  Lidocaine cream as needed

## 2022-11-06 ENCOUNTER — PATIENT MESSAGE (OUTPATIENT)
Dept: SURGERY | Facility: CLINIC | Age: 37
End: 2022-11-06
Payer: COMMERCIAL

## 2022-11-26 NOTE — TELEPHONE ENCOUNTER
----- Message from Pura Ibrahim NP sent at 8/7/2017 11:24 AM CDT -----  Please schedule follow up appt in 2-4 weeks for Ms. Almeidain.     Pura   [facility]  Respiratory Therapy  Patient Education      Name:  Olu [facility] Record Number:  [id number]  Age: 80 y.o.   : 1951  Today's Date:  2022  Room:  V1J-7047/5124-01         Assessment      /84   Pulse 95   Temp 98.4 °F (36.9 °C)   Resp (!) 33   Ht 5' 6.5\" (1.689 m)   Wt 158 lb 8.2 oz (71.9 kg)   SpO2 97%   BMI 25.20 kg/m²     Patient Active Problem List   Diagnosis    Tardive dyskinesia    Community acquired bacterial pneumonia    Esophageal dysphagia    Hoarseness, chronic    Acute respiratory failure with hypoxia (HCC)    Community acquired pneumonia of right lower lobe of lung    Centrilobular emphysema (HCC)    Lactic acidosis    Postnasal drip    Tobacco use    Sepsis (HCC)    Pneumonia    Generalized weakness    PNA (pneumonia)       Social History  Social History     Tobacco Use    Smoking status: Every Day     Packs/day: 0.50     Years: 42.00     Pack years: 21.00     Types: Cigarettes    Smokeless tobacco: Never   Substance Use Topics    Alcohol use: No     Comment: hasn't drunk in 10-12 years    Drug use: No       Modality:     MDI      Education       Patient/caregiver was educated on the proper method of use:  Yes        Level of patient/caregiver understanding able to:  Verbalize understanding, Demonstrate understanding, and Teach back     Education status:   Provided instructions on medications, Provided instructions on technique and indications, and Provided instructions on adverse reactions      Response to education:    Good     Teaching Time: 5  minutes         Electronically signed by Edilia Izaguirre RCP on 2022 at 4:45 PM

## 2022-11-29 ENCOUNTER — OFFICE VISIT (OUTPATIENT)
Dept: SURGERY | Facility: CLINIC | Age: 37
End: 2022-11-29
Payer: COMMERCIAL

## 2022-11-29 VITALS
SYSTOLIC BLOOD PRESSURE: 123 MMHG | HEART RATE: 79 BPM | BODY MASS INDEX: 33.82 KG/M2 | WEIGHT: 210.44 LBS | HEIGHT: 66 IN | DIASTOLIC BLOOD PRESSURE: 65 MMHG

## 2022-11-29 DIAGNOSIS — K60.2 ANAL FISSURE: Primary | ICD-10-CM

## 2022-11-29 PROCEDURE — 99999 PR PBB SHADOW E&M-EST. PATIENT-LVL V: CPT | Mod: PBBFAC,,, | Performed by: COLON & RECTAL SURGERY

## 2022-11-29 PROCEDURE — 99213 OFFICE O/P EST LOW 20 MIN: CPT | Mod: S$GLB,,, | Performed by: COLON & RECTAL SURGERY

## 2022-11-29 PROCEDURE — 99999 PR PBB SHADOW E&M-EST. PATIENT-LVL V: ICD-10-PCS | Mod: PBBFAC,,, | Performed by: COLON & RECTAL SURGERY

## 2022-11-29 PROCEDURE — 3078F DIAST BP <80 MM HG: CPT | Mod: CPTII,S$GLB,, | Performed by: COLON & RECTAL SURGERY

## 2022-11-29 PROCEDURE — 1159F PR MEDICATION LIST DOCUMENTED IN MEDICAL RECORD: ICD-10-PCS | Mod: CPTII,S$GLB,, | Performed by: COLON & RECTAL SURGERY

## 2022-11-29 PROCEDURE — 1159F MED LIST DOCD IN RCRD: CPT | Mod: CPTII,S$GLB,, | Performed by: COLON & RECTAL SURGERY

## 2022-11-29 PROCEDURE — 1160F PR REVIEW ALL MEDS BY PRESCRIBER/CLIN PHARMACIST DOCUMENTED: ICD-10-PCS | Mod: CPTII,S$GLB,, | Performed by: COLON & RECTAL SURGERY

## 2022-11-29 PROCEDURE — 1160F RVW MEDS BY RX/DR IN RCRD: CPT | Mod: CPTII,S$GLB,, | Performed by: COLON & RECTAL SURGERY

## 2022-11-29 PROCEDURE — 3008F PR BODY MASS INDEX (BMI) DOCUMENTED: ICD-10-PCS | Mod: CPTII,S$GLB,, | Performed by: COLON & RECTAL SURGERY

## 2022-11-29 PROCEDURE — 99213 PR OFFICE/OUTPT VISIT, EST, LEVL III, 20-29 MIN: ICD-10-PCS | Mod: S$GLB,,, | Performed by: COLON & RECTAL SURGERY

## 2022-11-29 PROCEDURE — 3074F SYST BP LT 130 MM HG: CPT | Mod: CPTII,S$GLB,, | Performed by: COLON & RECTAL SURGERY

## 2022-11-29 PROCEDURE — 3078F PR MOST RECENT DIASTOLIC BLOOD PRESSURE < 80 MM HG: ICD-10-PCS | Mod: CPTII,S$GLB,, | Performed by: COLON & RECTAL SURGERY

## 2022-11-29 PROCEDURE — 3008F BODY MASS INDEX DOCD: CPT | Mod: CPTII,S$GLB,, | Performed by: COLON & RECTAL SURGERY

## 2022-11-29 PROCEDURE — 3074F PR MOST RECENT SYSTOLIC BLOOD PRESSURE < 130 MM HG: ICD-10-PCS | Mod: CPTII,S$GLB,, | Performed by: COLON & RECTAL SURGERY

## 2022-11-29 NOTE — PROGRESS NOTES
CRS Office Visit Follow-up  Referring Md:   No referring provider defined for this encounter.    SUBJECTIVE:     Chief Complaint: anal fissure    History of Present Illness:  Patient is a 37 y.o. female presents with anal fissure. The patient is a established patient to this practice.     Course is as follows:  10/18/22: seen by NP for sharp anal pain with bm. Lingers after a bm. Makes her not want to have a bm.  Associated with bleeding.  Symptoms have been present for 5-6 months.   - she was treated with Diltiazem cream for an anal fissure.   11/29/22: Has been using diltiazem cream and taking Miralax daily with no relief. Has BM approx daily that are soft and formed but cause exquisite pain, also having bleeding with BM; avoids eating because is afraid to have a BM due to the pain.  No issues with fecal incontinence.  Prior hemorrhoid after her pregnancy.      Previous anorectal procedures: no   Last Colonoscopy completed on 8/7/2017  - normal  - repeat at 46  Family history of colorectal cancer or IBD: none.      Review of Systems:  Review of Systems   Constitutional: Negative.  Negative for chills, diaphoresis, fever, malaise/fatigue and weight loss.   HENT: Negative.  Negative for congestion.    Respiratory: Negative.  Negative for shortness of breath.    Cardiovascular: Negative.  Negative for chest pain and leg swelling.   Gastrointestinal:  Negative for abdominal pain, blood in stool, constipation, diarrhea, nausea and vomiting.        Sharp pain with BM, blood when wiping   Genitourinary: Negative.  Negative for dysuria.   Musculoskeletal:  Negative for back pain and myalgias.   Skin:  Negative for rash.   Neurological:  Negative for dizziness and weakness.   Endo/Heme/Allergies:  Does not bruise/bleed easily.   Psychiatric/Behavioral:  Negative for depression.      OBJECTIVE:     Vital Signs (Most Recent)  /65 (BP Location: Left arm, Patient Position: Sitting, BP Method: Large (Automatic))   Pulse  "79   Ht 5' 6" (1.676 m)   Wt 95.5 kg (210 lb 6.9 oz)   BMI 33.96 kg/m²     Physical Exam:  General: Black or  female in no distress   Neuro: alert and oriented x 4.  Moves all extremities.     HEENT: no icterus.  Trachea midline  Respiratory: respirations are even and unlabored  Cardiac: regular rate  Abdomen: soft, non-distended  Extremities: Warm dry and intact  Skin: no rashes  Anorectal: posterior midline anal fissure with sentinel pile    Labs: H&H 12 and 41.  TSH low, but free T4 normal.  Albumin 4.0.  normal renal function    Imaging: CT abd pelvis 5/29/21 reviewed and shows normal appearing colon and rectum.  Adnexal cyst noted.       ASSESSMENT/PLAN:     ySed was seen today for anal fissure.    Diagnoses and all orders for this visit:    Anal fissure  -     Case Request Operating Room: SPHINCTEROTOMY, ANAL, INTERNAL, LATERAL      37 y.o. female with posterior midline anal fissure refractory to medical therapy.      - Plan for sphincterotomy with possible skin tag excision  - Discussed the risks of bleeding, recurrence, pain, damage to the sphincter muscle.  - Consent signed in clinic      RADHA Bahena MD, FACS, FASCRS  Staff Surgeon  Colon & Rectal Surgery      "

## 2022-11-29 NOTE — H&P (VIEW-ONLY)
CRS Office Visit Follow-up  Referring Md:   No referring provider defined for this encounter.    SUBJECTIVE:     Chief Complaint: anal fissure    History of Present Illness:  Patient is a 37 y.o. female presents with anal fissure. The patient is a established patient to this practice.     Course is as follows:  10/18/22: seen by NP for sharp anal pain with bm. Lingers after a bm. Makes her not want to have a bm.  Associated with bleeding.  Symptoms have been present for 5-6 months.   - she was treated with Diltiazem cream for an anal fissure.   11/29/22: Has been using diltiazem cream and taking Miralax daily with no relief. Has BM approx daily that are soft and formed but cause exquisite pain, also having bleeding with BM; avoids eating because is afraid to have a BM due to the pain.  No issues with fecal incontinence.  Prior hemorrhoid after her pregnancy.      Previous anorectal procedures: no   Last Colonoscopy completed on 8/7/2017  - normal  - repeat at 46  Family history of colorectal cancer or IBD: none.      Review of Systems:  Review of Systems   Constitutional: Negative.  Negative for chills, diaphoresis, fever, malaise/fatigue and weight loss.   HENT: Negative.  Negative for congestion.    Respiratory: Negative.  Negative for shortness of breath.    Cardiovascular: Negative.  Negative for chest pain and leg swelling.   Gastrointestinal:  Negative for abdominal pain, blood in stool, constipation, diarrhea, nausea and vomiting.        Sharp pain with BM, blood when wiping   Genitourinary: Negative.  Negative for dysuria.   Musculoskeletal:  Negative for back pain and myalgias.   Skin:  Negative for rash.   Neurological:  Negative for dizziness and weakness.   Endo/Heme/Allergies:  Does not bruise/bleed easily.   Psychiatric/Behavioral:  Negative for depression.      OBJECTIVE:     Vital Signs (Most Recent)  /65 (BP Location: Left arm, Patient Position: Sitting, BP Method: Large (Automatic))   Pulse  "79   Ht 5' 6" (1.676 m)   Wt 95.5 kg (210 lb 6.9 oz)   BMI 33.96 kg/m²     Physical Exam:  General: Black or  female in no distress   Neuro: alert and oriented x 4.  Moves all extremities.     HEENT: no icterus.  Trachea midline  Respiratory: respirations are even and unlabored  Cardiac: regular rate  Abdomen: soft, non-distended  Extremities: Warm dry and intact  Skin: no rashes  Anorectal: posterior midline anal fissure with sentinel pile    Labs: H&H 12 and 41.  TSH low, but free T4 normal.  Albumin 4.0.  normal renal function    Imaging: CT abd pelvis 5/29/21 reviewed and shows normal appearing colon and rectum.  Adnexal cyst noted.       ASSESSMENT/PLAN:     Syed was seen today for anal fissure.    Diagnoses and all orders for this visit:    Anal fissure  -     Case Request Operating Room: SPHINCTEROTOMY, ANAL, INTERNAL, LATERAL      37 y.o. female with posterior midline anal fissure refractory to medical therapy.      - Plan for sphincterotomy with possible skin tag excision  - Discussed the risks of bleeding, recurrence, pain, damage to the sphincter muscle.  - Consent signed in clinic      RADHA Bahena MD, FACS, FASCRS  Staff Surgeon  Colon & Rectal Surgery      "

## 2022-12-06 ENCOUNTER — TELEPHONE (OUTPATIENT)
Dept: SURGERY | Facility: CLINIC | Age: 37
End: 2022-12-06
Payer: COMMERCIAL

## 2022-12-06 NOTE — PRE-PROCEDURE INSTRUCTIONS
PreOp Instructions given:   - Verbal medication information (what to hold and what to take)   - NPO guidelines   - Arrival place directions given; time to be given the day before procedure by the   Surgeon's Office   - Bathing with antibacterial soap   - Don't wear any jewelry or bring any valuables AM of surgery   - No makeup or moisturizer to face   - No perfume/cologne, powder, lotions or aftershave   Pt. verbalized understanding.   Pt denies any h/o Anesthesia/Sedation complications or side effects.  Patient given arrival time of 0600 to [facility]

## 2022-12-07 ENCOUNTER — ANESTHESIA (OUTPATIENT)
Dept: SURGERY | Facility: HOSPITAL | Age: 37
End: 2022-12-07
Payer: COMMERCIAL

## 2022-12-07 ENCOUNTER — ANESTHESIA EVENT (OUTPATIENT)
Dept: SURGERY | Facility: HOSPITAL | Age: 37
End: 2022-12-07
Payer: COMMERCIAL

## 2022-12-07 ENCOUNTER — HOSPITAL ENCOUNTER (OUTPATIENT)
Facility: HOSPITAL | Age: 37
Discharge: HOME | End: 2022-12-07
Attending: COLON & RECTAL SURGERY | Admitting: COLON & RECTAL SURGERY
Payer: COMMERCIAL

## 2022-12-07 VITALS
OXYGEN SATURATION: 100 % | HEART RATE: 72 BPM | RESPIRATION RATE: 12 BRPM | DIASTOLIC BLOOD PRESSURE: 61 MMHG | WEIGHT: 210 LBS | HEIGHT: 66 IN | TEMPERATURE: 98 F | BODY MASS INDEX: 33.75 KG/M2 | SYSTOLIC BLOOD PRESSURE: 102 MMHG

## 2022-12-07 DIAGNOSIS — K60.2 ANAL FISSURE: ICD-10-CM

## 2022-12-07 LAB
B-HCG UR QL: NEGATIVE
CTP QC/QA: YES

## 2022-12-07 PROCEDURE — 63600175 PHARM REV CODE 636 W HCPCS: Performed by: ANESTHESIOLOGY

## 2022-12-07 PROCEDURE — D9220A PRA ANESTHESIA: Mod: ANES,,, | Performed by: ANESTHESIOLOGY

## 2022-12-07 PROCEDURE — 63600175 PHARM REV CODE 636 W HCPCS: Mod: JG | Performed by: COLON & RECTAL SURGERY

## 2022-12-07 PROCEDURE — 63600175 PHARM REV CODE 636 W HCPCS: Performed by: NURSE ANESTHETIST, CERTIFIED REGISTERED

## 2022-12-07 PROCEDURE — 71000044 HC DOSC ROUTINE RECOVERY FIRST HOUR: Performed by: COLON & RECTAL SURGERY

## 2022-12-07 PROCEDURE — 46505 PR CHEMODENERVATION ANAL SPHINCTER: ICD-10-PCS | Mod: 50,,, | Performed by: COLON & RECTAL SURGERY

## 2022-12-07 PROCEDURE — 37000009 HC ANESTHESIA EA ADD 15 MINS: Performed by: COLON & RECTAL SURGERY

## 2022-12-07 PROCEDURE — 81025 URINE PREGNANCY TEST: CPT | Performed by: COLON & RECTAL SURGERY

## 2022-12-07 PROCEDURE — D9220A PRA ANESTHESIA: ICD-10-PCS | Mod: ANES,,, | Performed by: ANESTHESIOLOGY

## 2022-12-07 PROCEDURE — 25000003 PHARM REV CODE 250: Performed by: NURSE PRACTITIONER

## 2022-12-07 PROCEDURE — 46505 CHEMODENERVATION ANAL MUSC: CPT | Mod: 50,,, | Performed by: COLON & RECTAL SURGERY

## 2022-12-07 PROCEDURE — 37000008 HC ANESTHESIA 1ST 15 MINUTES: Performed by: COLON & RECTAL SURGERY

## 2022-12-07 PROCEDURE — 71000015 HC POSTOP RECOV 1ST HR: Performed by: COLON & RECTAL SURGERY

## 2022-12-07 PROCEDURE — D9220A PRA ANESTHESIA: ICD-10-PCS | Mod: CRNA,,, | Performed by: NURSE ANESTHETIST, CERTIFIED REGISTERED

## 2022-12-07 PROCEDURE — 36000704 HC OR TIME LEV I 1ST 15 MIN: Performed by: COLON & RECTAL SURGERY

## 2022-12-07 PROCEDURE — 25000003 PHARM REV CODE 250: Performed by: COLON & RECTAL SURGERY

## 2022-12-07 PROCEDURE — D9220A PRA ANESTHESIA: Mod: CRNA,,, | Performed by: NURSE ANESTHETIST, CERTIFIED REGISTERED

## 2022-12-07 PROCEDURE — 36000705 HC OR TIME LEV I EA ADD 15 MIN: Performed by: COLON & RECTAL SURGERY

## 2022-12-07 PROCEDURE — 25000003 PHARM REV CODE 250: Performed by: NURSE ANESTHETIST, CERTIFIED REGISTERED

## 2022-12-07 PROCEDURE — 25000003 PHARM REV CODE 250: Performed by: STUDENT IN AN ORGANIZED HEALTH CARE EDUCATION/TRAINING PROGRAM

## 2022-12-07 RX ORDER — DEXAMETHASONE SODIUM PHOSPHATE 4 MG/ML
INJECTION, SOLUTION INTRA-ARTICULAR; INTRALESIONAL; INTRAMUSCULAR; INTRAVENOUS; SOFT TISSUE
Status: DISCONTINUED | OUTPATIENT
Start: 2022-12-07 | End: 2022-12-07

## 2022-12-07 RX ORDER — MUPIROCIN 20 MG/G
OINTMENT TOPICAL
Status: DISPENSED | OUTPATIENT
Start: 2022-12-07

## 2022-12-07 RX ORDER — ACETAMINOPHEN 10 MG/ML
INJECTION, SOLUTION INTRAVENOUS
Status: DISCONTINUED | OUTPATIENT
Start: 2022-12-07 | End: 2022-12-07

## 2022-12-07 RX ORDER — ONDANSETRON 2 MG/ML
INJECTION INTRAMUSCULAR; INTRAVENOUS
Status: DISCONTINUED | OUTPATIENT
Start: 2022-12-07 | End: 2022-12-07

## 2022-12-07 RX ORDER — IBUPROFEN 600 MG/1
600 TABLET ORAL EVERY 6 HOURS PRN
Qty: 30 TABLET | Refills: 0 | Status: SHIPPED | OUTPATIENT
Start: 2022-12-07 | End: 2023-03-01

## 2022-12-07 RX ORDER — DEXMEDETOMIDINE HYDROCHLORIDE 100 UG/ML
INJECTION, SOLUTION INTRAVENOUS
Status: DISCONTINUED | OUTPATIENT
Start: 2022-12-07 | End: 2022-12-07

## 2022-12-07 RX ORDER — PROPOFOL 10 MG/ML
VIAL (ML) INTRAVENOUS
Status: DISCONTINUED | OUTPATIENT
Start: 2022-12-07 | End: 2022-12-07

## 2022-12-07 RX ORDER — SODIUM CHLORIDE 0.9 % (FLUSH) 0.9 %
3 SYRINGE (ML) INJECTION
Status: DISCONTINUED | OUTPATIENT
Start: 2022-12-07 | End: 2022-12-07 | Stop reason: HOSPADM

## 2022-12-07 RX ORDER — ROCURONIUM BROMIDE 10 MG/ML
INJECTION, SOLUTION INTRAVENOUS
Status: DISCONTINUED | OUTPATIENT
Start: 2022-12-07 | End: 2022-12-07

## 2022-12-07 RX ORDER — FENTANYL CITRATE 50 UG/ML
INJECTION, SOLUTION INTRAMUSCULAR; INTRAVENOUS
Status: DISCONTINUED | OUTPATIENT
Start: 2022-12-07 | End: 2022-12-07

## 2022-12-07 RX ORDER — LIDOCAINE HYDROCHLORIDE 10 MG/ML
INJECTION, SOLUTION INTRAVENOUS
Status: DISCONTINUED | OUTPATIENT
Start: 2022-12-07 | End: 2022-12-07

## 2022-12-07 RX ORDER — IBUPROFEN 200 MG
600 TABLET ORAL EVERY 6 HOURS PRN
Status: DISCONTINUED | OUTPATIENT
Start: 2022-12-07 | End: 2022-12-07 | Stop reason: HOSPADM

## 2022-12-07 RX ORDER — DEXTROMETHORPHAN HYDROBROMIDE, GUAIFENESIN 5; 100 MG/5ML; MG/5ML
650 LIQUID ORAL EVERY 6 HOURS PRN
Qty: 30 TABLET | Refills: 0 | Status: SHIPPED | OUTPATIENT
Start: 2022-12-07

## 2022-12-07 RX ORDER — IBUPROFEN 200 MG
600 TABLET ORAL ONCE
Status: COMPLETED | OUTPATIENT
Start: 2022-12-07 | End: 2022-12-07

## 2022-12-07 RX ORDER — ACETAMINOPHEN 500 MG
1000 TABLET ORAL EVERY 6 HOURS PRN
Status: DISCONTINUED | OUTPATIENT
Start: 2022-12-07 | End: 2022-12-07 | Stop reason: HOSPADM

## 2022-12-07 RX ORDER — SODIUM CHLORIDE 9 MG/ML
INJECTION, SOLUTION INTRAVENOUS CONTINUOUS
Status: ACTIVE | OUTPATIENT
Start: 2022-12-07

## 2022-12-07 RX ORDER — HYDROMORPHONE HYDROCHLORIDE 1 MG/ML
0.2 INJECTION, SOLUTION INTRAMUSCULAR; INTRAVENOUS; SUBCUTANEOUS EVERY 5 MIN PRN
Status: DISCONTINUED | OUTPATIENT
Start: 2022-12-07 | End: 2022-12-07 | Stop reason: HOSPADM

## 2022-12-07 RX ORDER — BUPIVACAINE HYDROCHLORIDE 2.5 MG/ML
INJECTION, SOLUTION EPIDURAL; INFILTRATION; INTRACAUDAL
Status: DISCONTINUED | OUTPATIENT
Start: 2022-12-07 | End: 2022-12-07 | Stop reason: HOSPADM

## 2022-12-07 RX ORDER — MIDAZOLAM HYDROCHLORIDE 1 MG/ML
INJECTION, SOLUTION INTRAMUSCULAR; INTRAVENOUS
Status: DISCONTINUED | OUTPATIENT
Start: 2022-12-07 | End: 2022-12-07

## 2022-12-07 RX ORDER — LIDOCAINE HYDROCHLORIDE 10 MG/ML
1 INJECTION, SOLUTION EPIDURAL; INFILTRATION; INTRACAUDAL; PERINEURAL ONCE
Status: ACTIVE | OUTPATIENT
Start: 2022-12-07

## 2022-12-07 RX ORDER — ESMOLOL HYDROCHLORIDE 10 MG/ML
INJECTION INTRAVENOUS
Status: DISCONTINUED | OUTPATIENT
Start: 2022-12-07 | End: 2022-12-07

## 2022-12-07 RX ADMIN — DEXMEDETOMIDINE HYDROCHLORIDE 12 MCG: 100 INJECTION, SOLUTION INTRAVENOUS at 08:12

## 2022-12-07 RX ADMIN — ESMOLOL HYDROCHLORIDE 20 MG: 100 INJECTION, SOLUTION INTRAVENOUS at 08:12

## 2022-12-07 RX ADMIN — SUGAMMADEX 200 MG: 100 INJECTION, SOLUTION INTRAVENOUS at 08:12

## 2022-12-07 RX ADMIN — FENTANYL CITRATE 25 MCG: 50 INJECTION, SOLUTION INTRAMUSCULAR; INTRAVENOUS at 08:12

## 2022-12-07 RX ADMIN — ACETAMINOPHEN 1000 MG: 10 INJECTION, SOLUTION INTRAVENOUS at 08:12

## 2022-12-07 RX ADMIN — DEXAMETHASONE SODIUM PHOSPHATE 4 MG: 4 INJECTION, SOLUTION INTRAMUSCULAR; INTRAVENOUS at 08:12

## 2022-12-07 RX ADMIN — IBUPROFEN 600 MG: 200 TABLET, FILM COATED ORAL at 10:12

## 2022-12-07 RX ADMIN — SODIUM CHLORIDE: 0.9 INJECTION, SOLUTION INTRAVENOUS at 08:12

## 2022-12-07 RX ADMIN — MIDAZOLAM HYDROCHLORIDE 2 MG: 1 INJECTION, SOLUTION INTRAMUSCULAR; INTRAVENOUS at 08:12

## 2022-12-07 RX ADMIN — ROCURONIUM BROMIDE 50 MG: 10 INJECTION INTRAVENOUS at 08:12

## 2022-12-07 RX ADMIN — LIDOCAINE HYDROCHLORIDE 50 MG: 10 INJECTION, SOLUTION INTRAVENOUS at 08:12

## 2022-12-07 RX ADMIN — MUPIROCIN: 20 OINTMENT TOPICAL at 07:12

## 2022-12-07 RX ADMIN — FENTANYL CITRATE 50 MCG: 50 INJECTION, SOLUTION INTRAMUSCULAR; INTRAVENOUS at 08:12

## 2022-12-07 RX ADMIN — HYDROMORPHONE HYDROCHLORIDE 0.2 MG: 1 INJECTION, SOLUTION INTRAMUSCULAR; INTRAVENOUS; SUBCUTANEOUS at 09:12

## 2022-12-07 RX ADMIN — PROPOFOL 200 MG: 10 INJECTION, EMULSION INTRAVENOUS at 08:12

## 2022-12-07 RX ADMIN — SODIUM CHLORIDE: 9 INJECTION, SOLUTION INTRAVENOUS at 07:12

## 2022-12-07 RX ADMIN — ONDANSETRON 4 MG: 2 INJECTION INTRAMUSCULAR; INTRAVENOUS at 08:12

## 2022-12-07 NOTE — INTERVAL H&P NOTE
The patient has been examined and the H&P has been reviewed:    I concur with the findings and no changes have occurred since H&P was written.    Surgery risks, benefits and alternative options discussed and understood by patient/family. Consent previously signed in clinic. Will proceed to OR for sphincterotomy with possible skin tag excision.    Osman Constantino MD  Colon and Rectal Surgery Fellow    There are no hospital problems to display for this patient.

## 2022-12-07 NOTE — TRANSFER OF CARE
"Anesthesia Transfer of Care Note    Patient: Syed Lyon    Procedure(s) Performed: Procedure(s) (LRB):  EXAM UNDER ANESTHESIA, PELVIS (N/A)  INJECTION, BOTULINUM TOXIN, TYPE A (N/A)    Patient location: [facility]    Anesthesia Type: general    Transport from OR: Transported from OR on 6-10 L/min O2 by face mask with adequate spontaneous ventilation    Post pain: adequate analgesia    Post assessment: no apparent anesthetic complications and tolerated procedure well    Post vital signs: stable    Level of consciousness: sedated    Nausea/Vomiting: no nausea/vomiting    Complications: none    Transfer of care protocol was followed      Last vitals:   Visit Vitals  /62 (BP Location: Left arm, Patient Position: Lying)   Pulse 79   Temp 36.8 °C (98.3 °F) (Oral)   Resp 18   Ht 5' 6" (1.676 m)   Wt 95.3 kg (210 lb)   SpO2 99%   Breastfeeding No   BMI 33.89 kg/m²     "

## 2022-12-07 NOTE — PATIENT INSTRUCTIONS
Anal Surgery Post Op Instructions:    You had injection of Botox and Kenolog performed today.    The Botox usually takes 72 hours to take effect so expect persistent symptoms until then. You do not have any incisions so should not experience significantly worsened pain after surgery but can take Tylenol or Ibuprofen as needed.    You have no activity restrictions.   No dietary restrictions.    Please continue to take your previous bowel regimen to prevent constipation    Follow up:  Return to clinic in 4 weeks for follow up and symptom check.    RADHA Bahena MD  Staff Surgeon  Colon & Rectal Surgery

## 2022-12-07 NOTE — ANESTHESIA PREPROCEDURE EVALUATION
12/07/2022  Syed Lyon is a 37 y.o., female.      Pre-op Assessment    I have reviewed the Patient Summary Reports.          Review of Systems  Anesthesia Hx:  No problems with previous Anesthesia    Social:  Non-Smoker    Hematology/Oncology:  Hematology Normal   Oncology Normal     EENT/Dental:EENT/Dental Normal   Cardiovascular:  Cardiovascular Normal     Pulmonary:  Pulmonary Normal    Renal/:  Renal/ Normal     Hepatic/GI:  Hepatic/GI Normal    Musculoskeletal:  Musculoskeletal Normal    Neurological:  Neurology Normal    Endocrine:  Endocrine Normal    Dermatological:  Skin Normal    Psych:  Psychiatric Normal           Physical Exam  General: Alert and Oriented    Airway:  Mallampati: II / II  Mouth Opening: Normal  TM Distance: Normal  Tongue: Normal  Neck ROM: Normal ROM    Dental:  Intact    Chest/Lungs:  Clear to auscultation, Normal Respiratory Rate    Heart:  Rate: Normal  Rhythm: Regular Rhythm  Sounds: Normal        Anesthesia Plan  Type of Anesthesia, risks & benefits discussed:    Anesthesia Type: Gen ETT, MAC  Intra-op Monitoring Plan: Standard ASA Monitors  Post Op Pain Control Plan: multimodal analgesia  Airway Plan: Direct  Informed Consent: Informed consent signed with the Patient and all parties understand the risks and agree with anesthesia plan.  All questions answered.   ASA Score: 2    Ready For Surgery From Anesthesia Perspective.     .

## 2022-12-07 NOTE — PLAN OF CARE
Patient discharged to home via wheelchair, escorted by her . Pt alert and talkative, vitals stable, tolerating PO intake. Discharge instructions (written and verbal) and follow-up information given to patient and her  who verbalized understanding, as well as a readiness for discharge. [facility] [place] info provided for additional questions following discharge.     Pt requests to speak with Dr. Bahena regarding the plan moving forward; however, they are in the OR currently and state that they will call patient when out of OR. Pt verbalized understanding of this.

## 2022-12-07 NOTE — ANESTHESIA POSTPROCEDURE EVALUATION
Anesthesia Post Evaluation    Patient: Syed Lyon    Procedure(s) Performed: Procedure(s) (LRB):  EXAM UNDER ANESTHESIA, PELVIS (N/A)  INJECTION, BOTULINUM TOXIN, TYPE A (N/A)    Final Anesthesia Type: general      Patient location during evaluation: PACU  Patient participation: Yes- Able to Participate  Level of consciousness: awake and alert  Post-procedure vital signs: reviewed and stable  Pain control: Pain has been treated.  Airway patency: patent    PONV status: PONV absent or treated.  Anesthetic complications: no      Cardiovascular status: hemodynamically stable  Respiratory status: spontaneous ventilation  Hydration status: euvolemic  Follow-up not needed.          Vitals Value Taken Time   /61 12/07/22 1045   Temp 36.5 °C (97.7 °F) 12/07/22 1045   Pulse 72 12/07/22 1045   Resp 12 12/07/22 1045   SpO2 100 % 12/07/22 1045         No case tracking events are documented in the log.      Pain/Keira Score: Pain Rating Prior to Med Admin: 8 (12/7/2022 10:07 AM)  Keira Score: 9 (12/7/2022 10:00 AM)

## 2022-12-07 NOTE — OP NOTE
LAMONTE OVIEDO  [id number]  [id number]    OPERATIVE NOTE:    DATE OF OPERATION: 12/07/2022    PREOPERATIVE DIAGNOSIS:  Medically refractory anal fissure    POSTOPERATIVE DIAGNOSIS:  Medically refractory anterior midline anal fissure    PROCEDURE PERFORMED:  Exam under anesthesia with chemical denervation of the anal sphincter with Botox and injection of Kenalog    ATTENDING SURGEON: RADHA Bahena MD    RESIDENT/ FELLOW SURGEON: Osman Constantino MD    ANESTHESIA: GETA    ESTIMATED BLOOD LOSS: 1 mL    FINDINGS:  1. Anterior midline anal fissure.  Decreased resting anal tone.  In the absence of hypertonic anal sphincter, sphincterotomy was not performed.  We therefore performed Botox and Kenalog injection    SPECIMENS:  None    COMPLICATIONS:  None    DISPOSITION: PACU    CONDITION: good    INDICATION FOR PROCEDURE:  Lamonte Oviedo is a 37 y.o. female nonhealing anal fissure.  She presented for exam under anesthesia with possible sphincterotomy    DESCRIPTION OF PROCEDURE:   After informed consent was reviewed, the patient was taken to the operating room and placed under general anesthesia.  she was placed in the prone mary kay-knife position.  The buttocks were taped apart and the perianal skin was prepped and draped in usual sterile fashion.  A time-out was then performed.  Digital exam demonstrated decreased resting anal tone.  Detailed anoscopy was performed.  Anterior midline 1.5 cm anal fissure.  This did not track anywhere no associated abscess or fistula.  Remainder of the anal canal was inspected and found to be normal.  No hypertonic anal sphincter.  No prominent intersphincteric groove.  The fissure was treated with electrocautery.  Just underneath the fissure, 2 mL of Kenalog-40 (80 mg) was injected.  Since the resting anal tone was hypotonic, Botox was injected rather than limited sphincterotomy.  100 units of Botox were injected into 4 quadrants in the intersphincteric groove under digital guidance.  30  mL of 0.25% Marcaine was injected laterally as a long acting local anesthetic.  The patient tolerated the procedure well.  There were no apparent complications.  Fluff dressings were applied.  she was then extubated and taken to PACU in stable condition.        RADHA Bahena MD, FACS, FASCRS  Staff Surgeon  Colon & Rectal Surgery

## 2022-12-07 NOTE — BRIEF OP NOTE
[facility] - Surgery (2nd Fl)  Brief Operative Note    Surgery Date: 12/7/2022     Surgeon(s) and Role:     * RADHA Bahena MD - Primary     * Osman Constantino MD - Resident - Assisting    Pre-op Diagnosis:  Anal fissure [K60.2]    Post-op Diagnosis:  Post-Op Diagnosis Codes:     * Anal fissure [K60.2]    Procedure(s) (LRB):  EXAM UNDER ANESTHESIA, PELVIS (N/A)  INJECTION, BOTULINUM TOXIN, TYPE A (N/A)    Anesthesia: General    Operative Findings: Anterior midline fissure without hypertonic internal anal sphincter, no evidence of fistula    Estimated Blood Loss: 2cc         Specimens:   Specimen (24h ago, onward)      None          Discharge Note    OUTCOME: Patient tolerated treatment/procedure well without complication and is now ready for discharge.    DISPOSITION: Home or Self Care    FINAL DIAGNOSIS:    * Anal fissure [K60.2]    FOLLOWUP: In clinic in 4 weeks    DISCHARGE INSTRUCTIONS:    Discharge Procedure Orders   Notify your health care provider if you experience any of the following:  increased confusion or weakness     Notify your health care provider if you experience any of the following:  persistent dizziness, light-headedness, or visual disturbances     Notify your health care provider if you experience any of the following:  worsening rash     Notify your health care provider if you experience any of the following:  severe persistent headache     Notify your health care provider if you experience any of the following:  difficulty breathing or increased cough     Notify your health care provider if you experience any of the following:  redness, tenderness, or signs of infection (pain, swelling, redness, odor or green/yellow discharge around incision site)     Notify your health care provider if you experience any of the following:  severe uncontrolled pain     Notify your health care provider if you experience any of the following:  persistent nausea and vomiting or diarrhea     Notify your  health care provider if you experience any of the following:  temperature >100.4     No dressing needed     Activity as tolerated

## 2022-12-07 NOTE — ANESTHESIA PROCEDURE NOTES
Intubation    Date/Time: 12/7/2022 8:31 AM  Performed by: Tomeka Lyon CRNA  Authorized by: Marcell Muniz MD     Intubation:     Induction:  Intravenous    Intubated:  Postinduction    Mask Ventilation:  Easy mask    Attempts:  1    Attempted By:  Student    Method of Intubation:  Direct    Blade:  Charles 2    Laryngeal View Grade: Grade I - full view of cords      Difficult Airway Encountered?: No      Complications:  None    Airway Device:  Oral endotracheal tube    Airway Device Size:  7.5    Style/Cuff Inflation:  Cuffed (inflated to minimal occlusive pressure)    Tube secured:  21    Secured at:  The teeth    Placement Verified By:  Capnometry    Complicating Factors:  None    Findings Post-Intubation:  BS equal bilateral and atraumatic/condition of teeth unchanged

## 2022-12-12 ENCOUNTER — PATIENT MESSAGE (OUTPATIENT)
Dept: SURGERY | Facility: CLINIC | Age: 37
End: 2022-12-12
Payer: COMMERCIAL

## 2022-12-19 ENCOUNTER — PATIENT MESSAGE (OUTPATIENT)
Dept: SURGERY | Facility: CLINIC | Age: 37
End: 2022-12-19

## 2022-12-19 ENCOUNTER — OFFICE VISIT (OUTPATIENT)
Dept: SURGERY | Facility: CLINIC | Age: 37
End: 2022-12-19
Payer: COMMERCIAL

## 2022-12-19 DIAGNOSIS — K60.2 ANAL FISSURE: Primary | ICD-10-CM

## 2022-12-19 PROCEDURE — 99024 PR POST-OP FOLLOW-UP VISIT: ICD-10-PCS | Mod: S$GLB,,, | Performed by: SURGERY

## 2022-12-19 PROCEDURE — 99024 POSTOP FOLLOW-UP VISIT: CPT | Mod: S$GLB,,, | Performed by: SURGERY

## 2022-12-19 NOTE — PROGRESS NOTES
CRS Office Visit History and Physical    Referring Md:   No referring provider defined for this encounter.    SUBJECTIVE:     Chief Complaint: rectal pain     History of Present Illness:  The patient is an established patient to this practice.   Course is as follows:  Syed Lyon is a 37 y.o. female presents with worsening rectal pain.  She has a chronic fissure and recently underwent EUA with botox and kenalog injection with Dr. Bahena.  Patient did not have hypertonic sphincter on exam.  She reports a firm, painful bowel movement the day after surgery.  Since then she has had ongoing pain and bleeding.  She is taking colace and miralax BID with improved fecal caliber.  She is taking alternating tylenol and ibuprofen for pain without relief.  Doing warm soaks in tub as well.     Last Colonoscopy:     Review of patient's allergies indicates:   Allergen Reactions    Broccoli flower Hives, Itching and Swelling    Mold Hives, Itching and Swelling    Mustard Hives, Itching and Swelling    Shellfish containing products Hives, Itching and Swelling    Soy Hives, Itching and Swelling    Strawberry Hives, Itching and Swelling       Past Medical History:   Diagnosis Date    Abnormal Pap smear of cervix     cryo yrs. ago, then 3- hpv positive, pap normal, 2017 colpo ECC normal,     Endometriosis     per dx lap Bad endo per pt    HPV (human papilloma virus) infection     Infertility, female         Oral contraceptive use     PID (pelvic inflammatory disease)      Past Surgical History:   Procedure Laterality Date     SECTION      COLONOSCOPY N/A 2017    Procedure: COLONOSCOPY;  Surgeon: Jose Zaidi MD;  Location: [facility] ([address] [place] [address]);  Service: Endoscopy;  Laterality: N/A;    fallopian tube removal Bilateral     GYNECOLOGIC CRYOSURGERY      HYSTEROSCOPY      2015    In Vitro      Dr Anuja Santana    INJECTION OF BOTULINUM TOXIN TYPE A N/A 2022    Procedure: INJECTION,  BOTULINUM TOXIN, TYPE A;  Surgeon: RADHA Bahena MD;  [facility];  Service: Colon and Rectal;  Laterality: N/A;    PELVIC EXAMINATION UNDER ANESTHESIA N/A 12/7/2022    Procedure: EXAM UNDER ANESTHESIA, PELVIS;  Surgeon: RADHA Bahena MD;  Location: [facility] OR 2ND FLR;  Service: Colon and Rectal;  Laterality: N/A;     Family History   Problem Relation Age of Onset    Heart disease Father     No Known Problems Mother     No Known Problems Daughter     Meningitis Brother     Breast cancer Neg Hx     Colon cancer Neg Hx     Ovarian cancer Neg Hx     Cancer Neg Hx     Colon polyps Neg Hx     Celiac disease Neg Hx     Esophageal cancer Neg Hx     Stomach cancer Neg Hx     Irritable bowel syndrome Neg Hx     Inflammatory bowel disease Neg Hx      Social History     Tobacco Use    Smoking status: Never    Smokeless tobacco: Never   Substance Use Topics    Alcohol use: Yes     Comment: occasional    Drug use: No        Review of Systems:  Review of Systems   Constitutional: Negative.    HENT: Negative.     Eyes: Negative.    Respiratory: Negative.     Cardiovascular: Negative.    Gastrointestinal:         See HPI   Genitourinary: Negative.    Musculoskeletal: Negative.    Skin: Negative.    Neurological: Negative.    Psychiatric/Behavioral: Negative.       OBJECTIVE:     Vital Signs (Most Recent)  There were no vitals taken for this visit.    Physical Exam:  General: 37 y.o. female in no distress   Neuro: alert and oriented x 4.  Moves all extremities.     HEENT: normocephalic, atraumatic, PERRL, EOMI   Respiratory: respirations are even and unlabored  Cardiac: regular rate and rhythm  Abdomen: soft, NTND  Extremities: Warm dry and intact  Skin: no rashes  Anorectal: chronic anal fissure in the anterior midline, no erythema or fluctuance     Labs: NA    Imaging: NA      ASSESSMENT/PLAN:     There are no diagnoses linked to this encounter.    37 y.o. female with chronic anal fissure s/p EUA and  chemical sphincterotomy with Dr. Bahena on 12/5.      - Continue miralax BID to maintain soft stools  - Continue tylenol, ibuprofen and sitz baths as needed  - Will do trial of vaginal lidocaine/valium/baclofen suppositories to help with discomfort   - follow up in 7-10 days if no improvement    Mali Cabello MD  Staff Surgeon  Colon & Rectal Surgery

## 2023-01-10 ENCOUNTER — OFFICE VISIT (OUTPATIENT)
Dept: SURGERY | Facility: CLINIC | Age: 38
End: 2023-01-10
Payer: COMMERCIAL

## 2023-01-10 VITALS
DIASTOLIC BLOOD PRESSURE: 70 MMHG | HEIGHT: 66 IN | HEART RATE: 70 BPM | WEIGHT: 215.63 LBS | SYSTOLIC BLOOD PRESSURE: 118 MMHG | BODY MASS INDEX: 34.65 KG/M2 | RESPIRATION RATE: 88 BRPM

## 2023-01-10 DIAGNOSIS — K60.2 ANAL FISSURE: Primary | ICD-10-CM

## 2023-01-10 PROCEDURE — 99024 POSTOP FOLLOW-UP VISIT: CPT | Mod: S$GLB,,, | Performed by: COLON & RECTAL SURGERY

## 2023-01-10 PROCEDURE — 3074F SYST BP LT 130 MM HG: CPT | Mod: CPTII,S$GLB,, | Performed by: COLON & RECTAL SURGERY

## 2023-01-10 PROCEDURE — 99024 PR POST-OP FOLLOW-UP VISIT: ICD-10-PCS | Mod: S$GLB,,, | Performed by: COLON & RECTAL SURGERY

## 2023-01-10 PROCEDURE — 99999 PR PBB SHADOW E&M-EST. PATIENT-LVL III: CPT | Mod: PBBFAC,,, | Performed by: COLON & RECTAL SURGERY

## 2023-01-10 PROCEDURE — 1159F MED LIST DOCD IN RCRD: CPT | Mod: CPTII,S$GLB,, | Performed by: COLON & RECTAL SURGERY

## 2023-01-10 PROCEDURE — 1160F RVW MEDS BY RX/DR IN RCRD: CPT | Mod: CPTII,S$GLB,, | Performed by: COLON & RECTAL SURGERY

## 2023-01-10 PROCEDURE — 3008F PR BODY MASS INDEX (BMI) DOCUMENTED: ICD-10-PCS | Mod: CPTII,S$GLB,, | Performed by: COLON & RECTAL SURGERY

## 2023-01-10 PROCEDURE — 3078F DIAST BP <80 MM HG: CPT | Mod: CPTII,S$GLB,, | Performed by: COLON & RECTAL SURGERY

## 2023-01-10 PROCEDURE — 3008F BODY MASS INDEX DOCD: CPT | Mod: CPTII,S$GLB,, | Performed by: COLON & RECTAL SURGERY

## 2023-01-10 PROCEDURE — 1160F PR REVIEW ALL MEDS BY PRESCRIBER/CLIN PHARMACIST DOCUMENTED: ICD-10-PCS | Mod: CPTII,S$GLB,, | Performed by: COLON & RECTAL SURGERY

## 2023-01-10 PROCEDURE — 3078F PR MOST RECENT DIASTOLIC BLOOD PRESSURE < 80 MM HG: ICD-10-PCS | Mod: CPTII,S$GLB,, | Performed by: COLON & RECTAL SURGERY

## 2023-01-10 PROCEDURE — 1159F PR MEDICATION LIST DOCUMENTED IN MEDICAL RECORD: ICD-10-PCS | Mod: CPTII,S$GLB,, | Performed by: COLON & RECTAL SURGERY

## 2023-01-10 PROCEDURE — 99999 PR PBB SHADOW E&M-EST. PATIENT-LVL III: ICD-10-PCS | Mod: PBBFAC,,, | Performed by: COLON & RECTAL SURGERY

## 2023-01-10 PROCEDURE — 3074F PR MOST RECENT SYSTOLIC BLOOD PRESSURE < 130 MM HG: ICD-10-PCS | Mod: CPTII,S$GLB,, | Performed by: COLON & RECTAL SURGERY

## 2023-01-10 NOTE — PROGRESS NOTES
CRS Office Visit Follow-up  Referring Md:   No referring provider defined for this encounter.    SUBJECTIVE:     Chief Complaint: anal fissure    History of Present Illness:  Patient is a 37 y.o. female presents with anal fissure. The patient is a established patient to this practice.     Course is as follows:  10/18/22: seen by NP for sharp anal pain with bm. Lingers after a bm. Makes her not want to have a bm.  Associated with bleeding.  Symptoms have been present for 5-6 months.   - she was treated with Diltiazem cream for an anal fissure.   11/29/22: Has been using diltiazem cream and taking Miralax daily with no relief. Has BM approx daily that are soft and formed but cause exquisite pain, also having bleeding with BM; avoids eating because is afraid to have a BM due to the pain.  No issues with fecal incontinence.  Prior hemorrhoid after her pregnancy.      12/7/22: Exam under anesthesia with chemical denervation of the anal sphincter with Botox and injection of Kenalog   - findings:  Anterior midline anal fissure.  Decreased resting anal tone.    Current status:   1/10/2023:  Had fecal urgency following multiple episodes of MiraLax and stool softeners.  Now having 1 bowel movement per day.  Ongoing bleeding.  Pain is improved but continues to have some pain with defecation    Last Colonoscopy completed on 8/7/2017  - normal  - repeat at 46  Family history of colorectal cancer or IBD: none.      Review of Systems:  Review of Systems   Constitutional: Negative.  Negative for chills, diaphoresis, fever, malaise/fatigue and weight loss.   HENT: Negative.  Negative for congestion.    Respiratory: Negative.  Negative for shortness of breath.    Cardiovascular: Negative.  Negative for chest pain and leg swelling.   Gastrointestinal:  Negative for abdominal pain, blood in stool, constipation, diarrhea, nausea and vomiting.        Sharp pain with BM, blood when wiping   Genitourinary: Negative.  Negative for dysuria.  "  Musculoskeletal:  Negative for back pain and myalgias.   Skin:  Negative for rash.   Neurological:  Negative for dizziness and weakness.   Endo/Heme/Allergies:  Does not bruise/bleed easily.   Psychiatric/Behavioral:  Negative for depression.      OBJECTIVE:     Vital Signs (Most Recent)  /70 (BP Location: Left arm, Patient Position: Sitting)   Pulse 70   Resp (!) 88   Ht 5' 6" (1.676 m)   Wt 97.8 kg (215 lb 9.6 oz)   BMI 34.80 kg/m²     Physical Exam:  General: Black or  female in no distress   Neuro: alert and oriented x 4.  Moves all extremities.     HEENT: no icterus.  Trachea midline  Respiratory: respirations are even and unlabored  Cardiac: regular rate  Abdomen: soft, non-distended  Extremities: Warm dry and intact  Skin: no rashes  Anorectal:  Nontender in the posterior midline.  More tender anteriorly and laterally.  Slight hypertonic internal anal sphincter.  Digital exam deferred.    Labs: H&H 12 and 41.  TSH low, but free T4 normal.  Albumin 4.0.  normal renal function    Imaging: CT abd pelvis 5/29/21 reviewed and shows normal appearing colon and rectum.  Adnexal cyst noted.       ASSESSMENT/PLAN:     Diagnoses and all orders for this visit:    Anal fissure          37 y.o. female with anal fissure refractory to medical therapy status post exam under anesthesia with Botox injection and Kenalog injection on 12/07/2022.  She is having slight improvement but continues to have some pain.  As long as she is having improvement, we will continue to watch her.  Discussed that the Botox would be present for approximately 3 months.  She will let me know she is having further issues in 6 weeks.      RADHA Bahena MD, FACS, FASCRS  Staff Surgeon  Colon & Rectal Surgery        "

## 2023-01-19 ENCOUNTER — PATIENT MESSAGE (OUTPATIENT)
Dept: SURGERY | Facility: CLINIC | Age: 38
End: 2023-01-19
Payer: COMMERCIAL

## 2023-01-27 ENCOUNTER — PATIENT MESSAGE (OUTPATIENT)
Dept: OBSTETRICS AND GYNECOLOGY | Facility: CLINIC | Age: 38
End: 2023-01-27
Payer: COMMERCIAL

## 2023-01-27 RX ORDER — VALACYCLOVIR HYDROCHLORIDE 500 MG/1
500 TABLET, FILM COATED ORAL 2 TIMES DAILY PRN
Qty: 30 TABLET | Refills: 1 | Status: SHIPPED | OUTPATIENT
Start: 2023-01-27 | End: 2023-02-03

## 2023-01-31 ENCOUNTER — TELEPHONE (OUTPATIENT)
Dept: OBSTETRICS AND GYNECOLOGY | Facility: CLINIC | Age: 38
End: 2023-01-31
Payer: COMMERCIAL

## 2023-01-31 NOTE — TELEPHONE ENCOUNTER
Called pt to confirm appt tomorrow with Dr. Winters at [facility] for 2:45 pm    Pt did not answer  Left detailed voice message    ND

## 2023-03-01 ENCOUNTER — OFFICE VISIT (OUTPATIENT)
Dept: OBSTETRICS AND GYNECOLOGY | Facility: CLINIC | Age: 38
End: 2023-03-01
Payer: COMMERCIAL

## 2023-03-01 ENCOUNTER — LAB VISIT (OUTPATIENT)
Dept: LAB | Facility: HOSPITAL | Age: 38
End: 2023-03-01
Attending: INTERNAL MEDICINE
Payer: COMMERCIAL

## 2023-03-01 VITALS
WEIGHT: 223.56 LBS | SYSTOLIC BLOOD PRESSURE: 102 MMHG | HEIGHT: 66 IN | DIASTOLIC BLOOD PRESSURE: 67 MMHG | BODY MASS INDEX: 35.93 KG/M2

## 2023-03-01 DIAGNOSIS — N89.8 VAGINAL DISCHARGE: ICD-10-CM

## 2023-03-01 DIAGNOSIS — Z12.4 SCREENING FOR CERVICAL CANCER: ICD-10-CM

## 2023-03-01 DIAGNOSIS — Z11.51 SCREENING FOR HUMAN PAPILLOMAVIRUS (HPV): ICD-10-CM

## 2023-03-01 DIAGNOSIS — N89.8 VAGINAL IRRITATION: ICD-10-CM

## 2023-03-01 DIAGNOSIS — Z11.3 SCREENING FOR STD (SEXUALLY TRANSMITTED DISEASE): ICD-10-CM

## 2023-03-01 DIAGNOSIS — Z01.419 ENCOUNTER FOR GYNECOLOGICAL EXAMINATION: Primary | ICD-10-CM

## 2023-03-01 DIAGNOSIS — Z12.31 BREAST CANCER SCREENING BY MAMMOGRAM: ICD-10-CM

## 2023-03-01 PROCEDURE — 3008F PR BODY MASS INDEX (BMI) DOCUMENTED: ICD-10-PCS | Mod: CPTII,S$GLB,, | Performed by: OBSTETRICS & GYNECOLOGY

## 2023-03-01 PROCEDURE — 99999 PR PBB SHADOW E&M-EST. PATIENT-LVL III: ICD-10-PCS | Mod: PBBFAC,,, | Performed by: OBSTETRICS & GYNECOLOGY

## 2023-03-01 PROCEDURE — 81514 NFCT DS BV&VAGINITIS DNA ALG: CPT | Performed by: OBSTETRICS & GYNECOLOGY

## 2023-03-01 PROCEDURE — 99999 PR PBB SHADOW E&M-EST. PATIENT-LVL III: CPT | Mod: PBBFAC,,, | Performed by: OBSTETRICS & GYNECOLOGY

## 2023-03-01 PROCEDURE — 3074F PR MOST RECENT SYSTOLIC BLOOD PRESSURE < 130 MM HG: ICD-10-PCS | Mod: CPTII,S$GLB,, | Performed by: OBSTETRICS & GYNECOLOGY

## 2023-03-01 PROCEDURE — 1159F MED LIST DOCD IN RCRD: CPT | Mod: CPTII,S$GLB,, | Performed by: OBSTETRICS & GYNECOLOGY

## 2023-03-01 PROCEDURE — 87624 HPV HI-RISK TYP POOLED RSLT: CPT | Performed by: OBSTETRICS & GYNECOLOGY

## 2023-03-01 PROCEDURE — 3074F SYST BP LT 130 MM HG: CPT | Mod: CPTII,S$GLB,, | Performed by: OBSTETRICS & GYNECOLOGY

## 2023-03-01 PROCEDURE — 88175 CYTOPATH C/V AUTO FLUID REDO: CPT | Performed by: OBSTETRICS & GYNECOLOGY

## 2023-03-01 PROCEDURE — 86803 HEPATITIS C AB TEST: CPT | Performed by: OBSTETRICS & GYNECOLOGY

## 2023-03-01 PROCEDURE — 3078F PR MOST RECENT DIASTOLIC BLOOD PRESSURE < 80 MM HG: ICD-10-PCS | Mod: CPTII,S$GLB,, | Performed by: OBSTETRICS & GYNECOLOGY

## 2023-03-01 PROCEDURE — 3008F BODY MASS INDEX DOCD: CPT | Mod: CPTII,S$GLB,, | Performed by: OBSTETRICS & GYNECOLOGY

## 2023-03-01 PROCEDURE — 99395 PREV VISIT EST AGE 18-39: CPT | Mod: S$GLB,,, | Performed by: OBSTETRICS & GYNECOLOGY

## 2023-03-01 PROCEDURE — 99395 PR PREVENTIVE VISIT,EST,18-39: ICD-10-PCS | Mod: S$GLB,,, | Performed by: OBSTETRICS & GYNECOLOGY

## 2023-03-01 PROCEDURE — 87340 HEPATITIS B SURFACE AG IA: CPT | Performed by: OBSTETRICS & GYNECOLOGY

## 2023-03-01 PROCEDURE — 86592 SYPHILIS TEST NON-TREP QUAL: CPT | Performed by: OBSTETRICS & GYNECOLOGY

## 2023-03-01 PROCEDURE — 87389 HIV-1 AG W/HIV-1&-2 AB AG IA: CPT | Performed by: OBSTETRICS & GYNECOLOGY

## 2023-03-01 PROCEDURE — 87591 N.GONORRHOEAE DNA AMP PROB: CPT | Performed by: OBSTETRICS & GYNECOLOGY

## 2023-03-01 PROCEDURE — 36415 COLL VENOUS BLD VENIPUNCTURE: CPT | Performed by: OBSTETRICS & GYNECOLOGY

## 2023-03-01 PROCEDURE — 1159F PR MEDICATION LIST DOCUMENTED IN MEDICAL RECORD: ICD-10-PCS | Mod: CPTII,S$GLB,, | Performed by: OBSTETRICS & GYNECOLOGY

## 2023-03-01 PROCEDURE — 3078F DIAST BP <80 MM HG: CPT | Mod: CPTII,S$GLB,, | Performed by: OBSTETRICS & GYNECOLOGY

## 2023-03-01 RX ORDER — SODIUM FLUORIDE 1.1 G/100G
CREAM ORAL 2 TIMES DAILY
COMMUNITY
Start: 2022-10-18

## 2023-03-01 RX ORDER — CLOTRIMAZOLE AND BETAMETHASONE DIPROPIONATE 10; .64 MG/G; MG/G
CREAM TOPICAL
Qty: 15 G | Refills: 1 | Status: SHIPPED | OUTPATIENT
Start: 2023-03-01 | End: 2024-02-29

## 2023-03-02 LAB
HBV SURFACE AG SERPL QL IA: NORMAL
HCV AB SERPL QL IA: NORMAL
HIV 1+2 AB+HIV1 P24 AG SERPL QL IA: NORMAL
RPR SER QL: NORMAL

## 2023-03-03 LAB
BACTERIAL VAGINOSIS DNA: POSITIVE
C TRACH DNA SPEC QL NAA+PROBE: NOT DETECTED
CANDIDA GLABRATA DNA: NEGATIVE
CANDIDA KRUSEI DNA: NEGATIVE
CANDIDA RRNA VAG QL PROBE: NEGATIVE
N GONORRHOEA DNA SPEC QL NAA+PROBE: NOT DETECTED
T VAGINALIS RRNA GENITAL QL PROBE: NEGATIVE

## 2023-03-06 ENCOUNTER — PATIENT MESSAGE (OUTPATIENT)
Dept: OBSTETRICS AND GYNECOLOGY | Facility: CLINIC | Age: 38
End: 2023-03-06
Payer: COMMERCIAL

## 2023-03-06 LAB
HPV HR 12 DNA SPEC QL NAA+PROBE: NEGATIVE
HPV16 AG SPEC QL: NEGATIVE
HPV18 DNA SPEC QL NAA+PROBE: NEGATIVE

## 2023-03-06 RX ORDER — METRONIDAZOLE 500 MG/1
500 TABLET ORAL 2 TIMES DAILY
Qty: 14 TABLET | Refills: 0 | Status: SHIPPED | OUTPATIENT
Start: 2023-03-06 | End: 2023-03-13

## 2023-03-07 LAB
FINAL PATHOLOGIC DIAGNOSIS: NORMAL
Lab: NORMAL

## 2023-03-08 ENCOUNTER — HOSPITAL ENCOUNTER (OUTPATIENT)
Dept: RADIOLOGY | Facility: HOSPITAL | Age: 38
Discharge: HOME | End: 2023-03-08
Attending: OBSTETRICS & GYNECOLOGY
Payer: COMMERCIAL

## 2023-03-08 VITALS — BODY MASS INDEX: 35.84 KG/M2 | HEIGHT: 66 IN | WEIGHT: 223 LBS

## 2023-03-08 DIAGNOSIS — Z12.31 BREAST CANCER SCREENING BY MAMMOGRAM: ICD-10-CM

## 2023-03-08 PROCEDURE — 77067 SCR MAMMO BI INCL CAD: CPT | Mod: 26,,, | Performed by: RADIOLOGY

## 2023-03-08 PROCEDURE — 77063 BREAST TOMOSYNTHESIS BI: CPT | Mod: 26,,, | Performed by: RADIOLOGY

## 2023-03-08 PROCEDURE — 77067 SCR MAMMO BI INCL CAD: CPT | Mod: TC

## 2023-03-08 PROCEDURE — 77067 MAMMO DIGITAL SCREENING BILAT WITH TOMO: ICD-10-PCS | Mod: 26,,, | Performed by: RADIOLOGY

## 2023-03-08 PROCEDURE — 77063 MAMMO DIGITAL SCREENING BILAT WITH TOMO: ICD-10-PCS | Mod: 26,,, | Performed by: RADIOLOGY

## 2023-03-24 ENCOUNTER — HOSPITAL ENCOUNTER (OUTPATIENT)
Dept: RADIOLOGY | Facility: HOSPITAL | Age: 38
Discharge: HOME | End: 2023-03-24
Attending: OBSTETRICS & GYNECOLOGY
Payer: COMMERCIAL

## 2023-03-24 DIAGNOSIS — R92.8 ABNORMAL MAMMOGRAM: ICD-10-CM

## 2023-03-24 PROCEDURE — 76642 ULTRASOUND BREAST LIMITED: CPT | Mod: 26,RT,, | Performed by: RADIOLOGY

## 2023-03-24 PROCEDURE — 76642 US BREAST RIGHT LIMITED: ICD-10-PCS | Mod: 26,RT,, | Performed by: RADIOLOGY

## 2023-03-24 PROCEDURE — 76642 ULTRASOUND BREAST LIMITED: CPT | Mod: TC,RT

## 2023-03-27 ENCOUNTER — PATIENT MESSAGE (OUTPATIENT)
Dept: OBSTETRICS AND GYNECOLOGY | Facility: CLINIC | Age: 38
End: 2023-03-27
Payer: COMMERCIAL

## 2023-03-28 ENCOUNTER — HOSPITAL ENCOUNTER (OUTPATIENT)
Dept: RADIOLOGY | Facility: HOSPITAL | Age: 38
Discharge: HOME | End: 2023-03-28
Attending: OBSTETRICS & GYNECOLOGY
Payer: COMMERCIAL

## 2023-03-28 DIAGNOSIS — R92.8 ABNORMAL MAMMOGRAM: ICD-10-CM

## 2023-03-28 PROCEDURE — 19083 US BREAST BIOPSY WITH IMAGING 1ST SITE RIGHT: ICD-10-PCS | Mod: RT,,, | Performed by: RADIOLOGY

## 2023-03-28 PROCEDURE — 88342 CHG IMMUNOCYTOCHEMISTRY: ICD-10-PCS | Mod: 26,,, | Performed by: PATHOLOGY

## 2023-03-28 PROCEDURE — 19083 BX BREAST 1ST LESION US IMAG: CPT | Mod: RT,,, | Performed by: RADIOLOGY

## 2023-03-28 PROCEDURE — 77065 MAMMO DIGITAL DIAGNOSTIC RIGHT: ICD-10-PCS | Mod: 26,RT,, | Performed by: RADIOLOGY

## 2023-03-28 PROCEDURE — 88342 IMHCHEM/IMCYTCHM 1ST ANTB: CPT | Performed by: PATHOLOGY

## 2023-03-28 PROCEDURE — 88305 TISSUE EXAM BY PATHOLOGIST: CPT | Mod: 26,,, | Performed by: PATHOLOGY

## 2023-03-28 PROCEDURE — 77065 DX MAMMO INCL CAD UNI: CPT | Mod: 26,RT,, | Performed by: RADIOLOGY

## 2023-03-28 PROCEDURE — 88305 TISSUE EXAM BY PATHOLOGIST: ICD-10-PCS | Mod: 26,,, | Performed by: PATHOLOGY

## 2023-03-28 PROCEDURE — 88342 IMHCHEM/IMCYTCHM 1ST ANTB: CPT | Mod: 26,,, | Performed by: PATHOLOGY

## 2023-03-28 PROCEDURE — 88305 TISSUE EXAM BY PATHOLOGIST: CPT | Performed by: PATHOLOGY

## 2023-03-28 PROCEDURE — A4648 IMPLANTABLE TISSUE MARKER: HCPCS

## 2023-03-28 PROCEDURE — 77065 DX MAMMO INCL CAD UNI: CPT | Mod: TC,RT

## 2023-03-31 LAB
COMMENT: NORMAL
FINAL PATHOLOGIC DIAGNOSIS: NORMAL
GROSS: NORMAL
Lab: NORMAL
MICROSCOPIC EXAM: NORMAL

## 2023-05-03 ENCOUNTER — OFFICE VISIT (OUTPATIENT)
Dept: INTERNAL MEDICINE | Facility: CLINIC | Age: 38
End: 2023-05-03
Payer: COMMERCIAL

## 2023-05-03 DIAGNOSIS — Z00.00 PREVENTATIVE HEALTH CARE: ICD-10-CM

## 2023-05-03 DIAGNOSIS — B35.1 ONYCHOMYCOSIS: ICD-10-CM

## 2023-05-03 DIAGNOSIS — Z01.419 WELL WOMAN EXAM: ICD-10-CM

## 2023-05-03 DIAGNOSIS — D64.9 ANEMIA, UNSPECIFIED TYPE: Primary | ICD-10-CM

## 2023-05-03 PROCEDURE — 3078F DIAST BP <80 MM HG: CPT | Mod: CPTII,S$GLB,, | Performed by: INTERNAL MEDICINE

## 2023-05-03 PROCEDURE — 1159F MED LIST DOCD IN RCRD: CPT | Mod: CPTII,S$GLB,, | Performed by: INTERNAL MEDICINE

## 2023-05-03 PROCEDURE — 99395 PR PREVENTIVE VISIT,EST,18-39: ICD-10-PCS | Mod: S$GLB,,, | Performed by: INTERNAL MEDICINE

## 2023-05-03 PROCEDURE — 3008F BODY MASS INDEX DOCD: CPT | Mod: CPTII,S$GLB,, | Performed by: INTERNAL MEDICINE

## 2023-05-03 PROCEDURE — 3074F PR MOST RECENT SYSTOLIC BLOOD PRESSURE < 130 MM HG: ICD-10-PCS | Mod: CPTII,S$GLB,, | Performed by: INTERNAL MEDICINE

## 2023-05-03 PROCEDURE — 3078F PR MOST RECENT DIASTOLIC BLOOD PRESSURE < 80 MM HG: ICD-10-PCS | Mod: CPTII,S$GLB,, | Performed by: INTERNAL MEDICINE

## 2023-05-03 PROCEDURE — 99999 PR PBB SHADOW E&M-EST. PATIENT-LVL V: CPT | Mod: PBBFAC,,, | Performed by: INTERNAL MEDICINE

## 2023-05-03 PROCEDURE — 3008F PR BODY MASS INDEX (BMI) DOCUMENTED: ICD-10-PCS | Mod: CPTII,S$GLB,, | Performed by: INTERNAL MEDICINE

## 2023-05-03 PROCEDURE — 1159F PR MEDICATION LIST DOCUMENTED IN MEDICAL RECORD: ICD-10-PCS | Mod: CPTII,S$GLB,, | Performed by: INTERNAL MEDICINE

## 2023-05-03 PROCEDURE — 99999 PR PBB SHADOW E&M-EST. PATIENT-LVL V: ICD-10-PCS | Mod: PBBFAC,,, | Performed by: INTERNAL MEDICINE

## 2023-05-03 PROCEDURE — 3074F SYST BP LT 130 MM HG: CPT | Mod: CPTII,S$GLB,, | Performed by: INTERNAL MEDICINE

## 2023-05-03 PROCEDURE — 99395 PREV VISIT EST AGE 18-39: CPT | Mod: S$GLB,,, | Performed by: INTERNAL MEDICINE

## 2023-05-03 RX ORDER — KETOCONAZOLE 20 MG/G
CREAM TOPICAL DAILY PRN
Qty: 15 G | Refills: 2 | Status: SHIPPED | OUTPATIENT
Start: 2023-05-03
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
## 2023-05-04 ENCOUNTER — LAB VISIT (OUTPATIENT)
Dept: LAB | Facility: HOSPITAL | Age: 38
End: 2023-05-04
Attending: INTERNAL MEDICINE
Payer: COMMERCIAL

## 2023-05-04 DIAGNOSIS — D64.9 ANEMIA, UNSPECIFIED TYPE: ICD-10-CM

## 2023-05-04 LAB
ALBUMIN SERPL BCP-MCNC: 3.9 G/DL (ref 3.5–5.2)
ALP SERPL-CCNC: 64 U/L (ref 55–135)
ALT SERPL W/O P-5'-P-CCNC: 11 U/L (ref 10–44)
ANION GAP SERPL CALC-SCNC: 6 MMOL/L (ref 8–16)
AST SERPL-CCNC: 14 U/L (ref 10–40)
BASOPHILS # BLD AUTO: 0.04 K/UL (ref 0–0.2)
BASOPHILS NFR BLD: 0.8 % (ref 0–1.9)
BILIRUB SERPL-MCNC: 0.6 MG/DL (ref 0.1–1)
BUN SERPL-MCNC: 15 MG/DL (ref 6–20)
CALCIUM SERPL-MCNC: 9.7 MG/DL (ref 8.7–10.5)
CHLORIDE SERPL-SCNC: 106 MMOL/L (ref 95–110)
CHOLEST SERPL-MCNC: 181 MG/DL (ref 120–199)
CHOLEST/HDLC SERPL: 3 {RATIO} (ref 2–5)
CO2 SERPL-SCNC: 30 MMOL/L (ref 23–29)
CREAT SERPL-MCNC: 0.8 MG/DL (ref 0.5–1.4)
DIFFERENTIAL METHOD: ABNORMAL
EOSINOPHIL # BLD AUTO: 0.6 K/UL (ref 0–0.5)
EOSINOPHIL NFR BLD: 11.1 % (ref 0–8)
ERYTHROCYTE [DISTWIDTH] IN BLOOD BY AUTOMATED COUNT: 15 % (ref 11.5–14.5)
EST. GFR  (NO RACE VARIABLE): >60 ML/MIN/1.73 M^2
GLUCOSE SERPL-MCNC: 88 MG/DL (ref 70–110)
HCT VFR BLD AUTO: 39.4 % (ref 37–48.5)
HDLC SERPL-MCNC: 60 MG/DL (ref 40–75)
HDLC SERPL: 33.1 % (ref 20–50)
HGB BLD-MCNC: 11.9 G/DL (ref 12–16)
IMM GRANULOCYTES # BLD AUTO: 0.01 K/UL (ref 0–0.04)
IMM GRANULOCYTES NFR BLD AUTO: 0.2 % (ref 0–0.5)
LDLC SERPL CALC-MCNC: 108.2 MG/DL (ref 63–159)
LYMPHOCYTES # BLD AUTO: 1.5 K/UL (ref 1–4.8)
LYMPHOCYTES NFR BLD: 29.8 % (ref 18–48)
MCH RBC QN AUTO: 22.2 PG (ref 27–31)
MCHC RBC AUTO-ENTMCNC: 30.2 G/DL (ref 32–36)
MCV RBC AUTO: 74 FL (ref 82–98)
MONOCYTES # BLD AUTO: 0.6 K/UL (ref 0.3–1)
MONOCYTES NFR BLD: 11.1 % (ref 4–15)
NEUTROPHILS # BLD AUTO: 2.3 K/UL (ref 1.8–7.7)
NEUTROPHILS NFR BLD: 47 % (ref 38–73)
NONHDLC SERPL-MCNC: 121 MG/DL
NRBC BLD-RTO: 0 /100 WBC
PLATELET # BLD AUTO: 210 K/UL (ref 150–450)
PMV BLD AUTO: 11 FL (ref 9.2–12.9)
POTASSIUM SERPL-SCNC: 4.4 MMOL/L (ref 3.5–5.1)
PROT SERPL-MCNC: 7.5 G/DL (ref 6–8.4)
RBC # BLD AUTO: 5.35 M/UL (ref 4–5.4)
SODIUM SERPL-SCNC: 142 MMOL/L (ref 136–145)
TRIGL SERPL-MCNC: 64 MG/DL (ref 30–150)
TSH SERPL DL<=0.005 MIU/L-ACNC: 0.77 UIU/ML (ref 0.4–4)
WBC # BLD AUTO: 4.97 K/UL (ref 3.9–12.7)

## 2023-05-04 PROCEDURE — 36415 COLL VENOUS BLD VENIPUNCTURE: CPT | Performed by: INTERNAL MEDICINE

## 2023-05-04 PROCEDURE — 80061 LIPID PANEL: CPT | Performed by: INTERNAL MEDICINE

## 2023-05-04 PROCEDURE — 80053 COMPREHEN METABOLIC PANEL: CPT | Performed by: INTERNAL MEDICINE

## 2023-05-04 PROCEDURE — 84443 ASSAY THYROID STIM HORMONE: CPT | Performed by: INTERNAL MEDICINE

## 2023-05-04 PROCEDURE — 85025 COMPLETE CBC W/AUTO DIFF WBC: CPT | Performed by: INTERNAL MEDICINE

## 2023-05-07 VITALS
TEMPERATURE: 99 F | OXYGEN SATURATION: 98 % | BODY MASS INDEX: 34.98 KG/M2 | HEART RATE: 83 BPM | HEIGHT: 66 IN | WEIGHT: 217.63 LBS | DIASTOLIC BLOOD PRESSURE: 74 MMHG | SYSTOLIC BLOOD PRESSURE: 110 MMHG

## 2023-05-07 NOTE — PROGRESS NOTES
Subjective:       Patient ID: Syed Lyon is a 37 y.o. female.    Chief Complaint: Annual Exam    HPI  She is here for annual exam     Past medical history: Iron deficiency anemia     Medications:  Birth control pills     No known drug allergies       Review of Systems   Constitutional:  Negative for chills, fatigue, fever and unexpected weight change.   Respiratory:  Negative for chest tightness and shortness of breath.    Cardiovascular:  Negative for chest pain and palpitations.   Gastrointestinal:  Negative for abdominal pain and blood in stool.   Neurological:  Negative for dizziness, syncope, numbness and headaches.     Objective:      Physical Exam  HENT:      Right Ear: External ear normal.      Left Ear: External ear normal.      Nose: Nose normal.      Mouth/Throat:      Mouth: Mucous membranes are moist.      Pharynx: Oropharynx is clear.   Eyes:      Pupils: Pupils are equal, round, and reactive to light.   Cardiovascular:      Rate and Rhythm: Normal rate and regular rhythm.      Heart sounds: No murmur heard.  Pulmonary:      Breath sounds: Normal breath sounds.   Abdominal:      General: There is no distension.      Palpations: There is no hepatomegaly or splenomegaly.      Tenderness: There is no abdominal tenderness.   Musculoskeletal:      Cervical back: Normal range of motion.   Lymphadenopathy:      Cervical: No cervical adenopathy.      Upper Body:      Right upper body: No axillary adenopathy.      Left upper body: No axillary adenopathy.   Neurological:      Cranial Nerves: No cranial nerve deficit.      Sensory: No sensory deficit.      Motor: Motor function is intact.      Deep Tendon Reflexes: Reflexes are normal and symmetric.       Assessment/Plan       Assessment and plan:  Annual exam.  Check CMP, lipid panel, CBC, TSH

## 2023-05-08 ENCOUNTER — OFFICE VISIT (OUTPATIENT)
Dept: PODIATRY | Facility: CLINIC | Age: 38
End: 2023-05-08
Payer: COMMERCIAL

## 2023-05-08 VITALS
HEART RATE: 80 BPM | HEIGHT: 66 IN | WEIGHT: 215.63 LBS | SYSTOLIC BLOOD PRESSURE: 117 MMHG | BODY MASS INDEX: 34.65 KG/M2 | DIASTOLIC BLOOD PRESSURE: 82 MMHG

## 2023-05-08 DIAGNOSIS — M79.672 PAIN IN BOTH FEET: Primary | ICD-10-CM

## 2023-05-08 DIAGNOSIS — B35.1 ONYCHOMYCOSIS: ICD-10-CM

## 2023-05-08 DIAGNOSIS — L60.9 DISEASE OF NAIL: ICD-10-CM

## 2023-05-08 DIAGNOSIS — M79.671 PAIN IN BOTH FEET: Primary | ICD-10-CM

## 2023-05-08 DIAGNOSIS — B35.3 TINEA PEDIS OF BOTH FEET: ICD-10-CM

## 2023-05-08 PROCEDURE — 3079F PR MOST RECENT DIASTOLIC BLOOD PRESSURE 80-89 MM HG: ICD-10-PCS | Mod: CPTII,S$GLB,, | Performed by: PODIATRIST

## 2023-05-08 PROCEDURE — 3008F BODY MASS INDEX DOCD: CPT | Mod: CPTII,S$GLB,, | Performed by: PODIATRIST

## 2023-05-08 PROCEDURE — 3074F SYST BP LT 130 MM HG: CPT | Mod: CPTII,S$GLB,, | Performed by: PODIATRIST

## 2023-05-08 PROCEDURE — 99203 PR OFFICE/OUTPT VISIT, NEW, LEVL III, 30-44 MIN: ICD-10-PCS | Mod: S$GLB,,, | Performed by: PODIATRIST

## 2023-05-08 PROCEDURE — 1159F PR MEDICATION LIST DOCUMENTED IN MEDICAL RECORD: ICD-10-PCS | Mod: CPTII,S$GLB,, | Performed by: PODIATRIST

## 2023-05-08 PROCEDURE — 99999 PR PBB SHADOW E&M-EST. PATIENT-LVL IV: CPT | Mod: PBBFAC,,, | Performed by: PODIATRIST

## 2023-05-08 PROCEDURE — 99999 PR PBB SHADOW E&M-EST. PATIENT-LVL IV: ICD-10-PCS | Mod: PBBFAC,,, | Performed by: PODIATRIST

## 2023-05-08 PROCEDURE — 99203 OFFICE O/P NEW LOW 30 MIN: CPT | Mod: S$GLB,,, | Performed by: PODIATRIST

## 2023-05-08 PROCEDURE — 3079F DIAST BP 80-89 MM HG: CPT | Mod: CPTII,S$GLB,, | Performed by: PODIATRIST

## 2023-05-08 PROCEDURE — 1159F MED LIST DOCD IN RCRD: CPT | Mod: CPTII,S$GLB,, | Performed by: PODIATRIST

## 2023-05-08 PROCEDURE — 3074F PR MOST RECENT SYSTOLIC BLOOD PRESSURE < 130 MM HG: ICD-10-PCS | Mod: CPTII,S$GLB,, | Performed by: PODIATRIST

## 2023-05-08 PROCEDURE — 3008F PR BODY MASS INDEX (BMI) DOCUMENTED: ICD-10-PCS | Mod: CPTII,S$GLB,, | Performed by: PODIATRIST

## 2023-05-08 RX ORDER — METHOCARBAMOL 500 MG/1
TABLET, FILM COATED ORAL
COMMUNITY
Start: 2023-03-16

## 2023-05-08 RX ORDER — MELOXICAM 15 MG/1
15 TABLET ORAL
COMMUNITY
Start: 2023-03-16

## 2023-05-08 RX ORDER — TERBINAFINE HYDROCHLORIDE 250 MG/1
250 TABLET ORAL DAILY
Qty: 90 TABLET | Refills: 0 | Status: SHIPPED | OUTPATIENT
Start: 2023-05-08 | End: 2023-08-06

## 2023-05-08 RX ORDER — CLOBETASOL PROPIONATE 0.5 MG/G
CREAM TOPICAL 2 TIMES DAILY
Qty: 60 G | Refills: 2 | Status: SHIPPED | OUTPATIENT
Start: 2023-05-08

## 2023-05-15 NOTE — PROGRESS NOTES
Subjective:      Patient ID: Syed Lyon is a 37 y.o. female.    Chief Complaint:   Foot Problem (Nail discoloration/Itching)    Syed is a 37 y.o. female who presents to the clinic complaining of thick and discolored toenails on both feet. Syed is inquiring about treatment options.    Review of Systems   Constitutional: Negative for chills, decreased appetite, fever and malaise/fatigue.   HENT:  Negative for congestion, hearing loss, nosebleeds and tinnitus.    Eyes:  Negative for double vision, pain, photophobia and visual disturbance.   Cardiovascular:  Negative for chest pain, claudication, cyanosis and leg swelling.   Respiratory:  Negative for cough, hemoptysis, shortness of breath and wheezing.    Endocrine: Negative for cold intolerance and heat intolerance.   Hematologic/Lymphatic: Negative for adenopathy and bleeding problem.   Skin:  Positive for color change, dry skin and nail changes. Negative for itching and suspicious lesions.   Musculoskeletal:  Negative for arthritis, joint pain, myalgias and stiffness.   Gastrointestinal:  Negative for abdominal pain, jaundice, nausea and vomiting.   Genitourinary:  Negative for dysuria, frequency and hematuria.   Neurological:  Negative for difficulty with concentration, loss of balance, numbness, paresthesias and sensory change.   Psychiatric/Behavioral:  Negative for altered mental status, hallucinations and suicidal ideas. The patient is not nervous/anxious.    Allergic/Immunologic: Negative for environmental allergies and persistent infections.         Objective:      Physical Exam  Vitals reviewed.   Constitutional:       Appearance: She is well-developed.   HENT:      Head: Normocephalic and atraumatic.   Cardiovascular:      Pulses:           Dorsalis pedis pulses are 2+ on the right side and 2+ on the left side.        Posterior tibial pulses are 2+ on the right side and 2+ on the left side.   Pulmonary:      Effort: Pulmonary effort is normal.    Musculoskeletal:         General: Normal range of motion.      Comments: Inspection and palpation of the muscles joints and bones of both lower extremities reveal that muscle strength for the anterior lateral and posterior muscle groups and intrinsic muscle groups of the foot are all 5 over 5 symmetrical.  Ankle subtalar midtarsal and digital joint range of motion are within normal limits, nonpainful, without crepitus or effusion.  Patient exhibits a normal angle and base of gait.  Palpation of the tendons reveal no defects.   Skin:     General: Skin is warm and dry.      Capillary Refill: Capillary refill takes 2 to 3 seconds.      Comments: Scaling dryness in a moccasin distribution is noted to the bilateral lower extremities with associated erythema.    Long, thickened, discolored  toenails 1-5 with subungual debris     Neurological:      Mental Status: She is alert and oriented to person, place, and time.      Comments: Sharp dull light touch vibratory proprioceptive sensation are intact bilaterally.  Deep tendon reflexes to patellar and Achilles tendon are symmetrical 2 over 4 bilaterally.  No ankle clonus or Babinski reflexes noted bilaterally.  Coordination is normal to both feet and lower extremities.   Psychiatric:         Behavior: Behavior normal.             Assessment:       Encounter Diagnoses   Name Primary?    Onychomycosis     Pain in both feet Yes    Disease of nail     Tinea pedis of both feet      Independent visualization of imaging was performed.  Results were reviewed in detail with patient.       Plan:       Syed was seen today for foot problem.    Diagnoses and all orders for this visit:    Pain in both feet    Onychomycosis  -     Ambulatory referral/consult to Podiatry    Disease of nail    Tinea pedis of both feet    Other orders  -     clobetasoL (TEMOVATE) 0.05 % cream; Apply topically 2 (two) times daily.  -     terbinafine HCL (LAMISIL) 250 mg tablet; Take 1 tablet (250 mg total)  by mouth once daily.      I counseled the patient on her conditions, their implications and medical management.    The nature of the condition, options for management, as well as potential risks and complications were discussed in detail with patient. Patient was amenable to my recommendations and left my office fully informed and will follow up as instructed or sooner if necessary.      We discussed using Lamisil for onychomycosis. This drug offers a fairly high but not universal cure rate. A 12 week treatment course is recommended. The patient is aware that rare cases of liver injury have been reported; and agrees to come in for liver function tests at 6 weeks of treatment. The symptoms of liver disease have been discussed; call if such occurs. In addition, some insurance plans do not cover the expense of this drug for treating a cosmetic condition, and the patient understands they may have to pay for the medication. Other side effects, such as headaches and rashes, have also been discussed.    Follow-up in 3-6 months.

## 2023-05-17 ENCOUNTER — PATIENT MESSAGE (OUTPATIENT)
Dept: OBSTETRICS AND GYNECOLOGY | Facility: CLINIC | Age: 38
End: 2023-05-17
Payer: COMMERCIAL

## 2023-06-26 ENCOUNTER — LAB VISIT (OUTPATIENT)
Dept: LAB | Facility: HOSPITAL | Age: 38
End: 2023-06-26
Payer: COMMERCIAL

## 2023-06-26 ENCOUNTER — OFFICE VISIT (OUTPATIENT)
Dept: OBSTETRICS AND GYNECOLOGY | Facility: CLINIC | Age: 38
End: 2023-06-26
Attending: OBSTETRICS & GYNECOLOGY
Payer: COMMERCIAL

## 2023-06-26 VITALS
SYSTOLIC BLOOD PRESSURE: 118 MMHG | HEIGHT: 66 IN | WEIGHT: 220.69 LBS | DIASTOLIC BLOOD PRESSURE: 74 MMHG | BODY MASS INDEX: 35.47 KG/M2

## 2023-06-26 DIAGNOSIS — Z01.419 WELL WOMAN EXAM: ICD-10-CM

## 2023-06-26 DIAGNOSIS — N95.1 PERIMENOPAUSE: Primary | ICD-10-CM

## 2023-06-26 DIAGNOSIS — N95.1 PERIMENOPAUSE: ICD-10-CM

## 2023-06-26 LAB
ESTRADIOL SERPL-MCNC: 30 PG/ML
FSH SERPL-ACNC: 27.8 MIU/ML
PROGEST SERPL-MCNC: 0.1 NG/ML
PROLACTIN SERPL IA-MCNC: 9.9 NG/ML (ref 5.2–26.5)
TESTOST SERPL-MCNC: 19 NG/DL (ref 5–73)

## 2023-06-26 PROCEDURE — 1159F MED LIST DOCD IN RCRD: CPT | Mod: CPTII,S$GLB,,

## 2023-06-26 PROCEDURE — 3074F PR MOST RECENT SYSTOLIC BLOOD PRESSURE < 130 MM HG: ICD-10-PCS | Mod: CPTII,S$GLB,,

## 2023-06-26 PROCEDURE — 82670 ASSAY OF TOTAL ESTRADIOL: CPT

## 2023-06-26 PROCEDURE — 99999 PR PBB SHADOW E&M-EST. PATIENT-LVL IV: CPT | Mod: PBBFAC,,,

## 2023-06-26 PROCEDURE — 3078F DIAST BP <80 MM HG: CPT | Mod: CPTII,S$GLB,,

## 2023-06-26 PROCEDURE — 3008F BODY MASS INDEX DOCD: CPT | Mod: CPTII,S$GLB,,

## 2023-06-26 PROCEDURE — 3078F PR MOST RECENT DIASTOLIC BLOOD PRESSURE < 80 MM HG: ICD-10-PCS | Mod: CPTII,S$GLB,,

## 2023-06-26 PROCEDURE — 83520 IMMUNOASSAY QUANT NOS NONAB: CPT

## 2023-06-26 PROCEDURE — 83001 ASSAY OF GONADOTROPIN (FSH): CPT

## 2023-06-26 PROCEDURE — 99212 OFFICE O/P EST SF 10 MIN: CPT | Mod: S$GLB,,,

## 2023-06-26 PROCEDURE — 99212 PR OFFICE/OUTPT VISIT, EST, LEVL II, 10-19 MIN: ICD-10-PCS | Mod: S$GLB,,,

## 2023-06-26 PROCEDURE — 3008F PR BODY MASS INDEX (BMI) DOCUMENTED: ICD-10-PCS | Mod: CPTII,S$GLB,,

## 2023-06-26 PROCEDURE — 36415 COLL VENOUS BLD VENIPUNCTURE: CPT

## 2023-06-26 PROCEDURE — 84403 ASSAY OF TOTAL TESTOSTERONE: CPT

## 2023-06-26 PROCEDURE — 84402 ASSAY OF FREE TESTOSTERONE: CPT

## 2023-06-26 PROCEDURE — 3074F SYST BP LT 130 MM HG: CPT | Mod: CPTII,S$GLB,,

## 2023-06-26 PROCEDURE — 1159F PR MEDICATION LIST DOCUMENTED IN MEDICAL RECORD: ICD-10-PCS | Mod: CPTII,S$GLB,,

## 2023-06-26 PROCEDURE — 99999 PR PBB SHADOW E&M-EST. PATIENT-LVL IV: ICD-10-PCS | Mod: PBBFAC,,,

## 2023-06-26 PROCEDURE — 84146 ASSAY OF PROLACTIN: CPT

## 2023-06-26 PROCEDURE — 84144 ASSAY OF PROGESTERONE: CPT

## 2023-06-26 NOTE — PROGRESS NOTES
"Chief Complaint: Follow up     HPI:      Syed Lyon is a 37 y.o.  who presents today for follow up after being seen by fertility.  She reports recently coming off of birth control on  in preparation for IVF.  She has had three cycles since then.  She reports her first two cycles ( and May 22nd) were pretty normal lasting about 6 days. Her most recent period in  was very light spotting.  Patient's last menstrual period was 2023 (exact date).  Endorses PMS symptoms prior to cycles.  Denies hot flashes, night sweats.  Menses are have been monthly.  is currently sexually active with a single male partner.     She has a history of infertility requiring IVF treatment in  with conception after her 2nd cycle. She was seen by [facility] in March for workup for IVF in hopes of having another child.  At that time lab work and an ultrasound was obtained (results below).  She was told that due to her low AMH, elevated FSH levels, and results of ultrasound that she did not recommend moving forward with IVF.  She is here today to discuss things further. Of note, pt reports her  has had a sperm analysis and everything was okay.  He was going to start a supplement but then they got this news from Dr. Santana.    Prolactin 15.2 ng/mL  Thyroid perioxidase (TPO) H 49 IU/mL  TSH 0.769 uIU/mL  T4 Free (direct) 1.17 ng/dL  Vit D (L) 17.0 ng/mL - started taking Vit D3 supplements since labs were drawn  AMH 0.077 ng/mL  FSH 24.19 mIU/ml  Estradiol 22.94 ph/mL  Ultrasound (per Dr. Santana):  showed 3 pockets for eggs and that the recommended amount is at least 4.    Physical Exam:      PHYSICAL EXAM:  /74   Ht 5' 6" (1.676 m)   Wt 100.1 kg (220 lb 10.9 oz)   LMP 2023 (Exact Date)   BMI 35.62 kg/m²   Body mass index is 35.62 kg/m².     APPEARANCE: Well nourished, well developed, in no acute distress.  PELVIC:  Deferred    Assessment/Plan:     Perimenopause  -    " Lab work per fertility center obtained a month after stopping OCP's.  Will repeat labs today to see if any change after being off of OCP's for more than 3 months.  -    Testosterone, Free; Future  -    Testosterone; Future  -     Follicle Stimulating Hormone; Future; Expected date: 06/26/2023  -     Estradiol; Future; Expected date: 06/26/2023  -     Progesterone; Future; Expected date: 06/26/2023  -     Antimullerian Hormone (AMH); Future; Expected date: 06/26/2023  -     Prolactin; Future    Follow up in 1-2 weeks for virtual visit with Dr. Radha Judd (Maggie), JOSÉ MIGUEL  Obstetrics and Gynecology  [facility]

## 2023-06-26 NOTE — Clinical Note
I saw this patient yesterday and I didn't really know how you wanted to proceed.  I told her I would follow up with you to see what you were thinking.  No

## 2023-06-27 ENCOUNTER — PATIENT MESSAGE (OUTPATIENT)
Dept: OBSTETRICS AND GYNECOLOGY | Facility: CLINIC | Age: 38
End: 2023-06-27
Payer: COMMERCIAL

## 2023-06-28 LAB — MIS SERPL-MCNC: <0.03 NG/ML (ref 0.15–7.5)

## 2023-06-29 LAB — TESTOST FREE SERPL-MCNC: <0.4 PG/ML

## 2023-08-08 ENCOUNTER — OFFICE VISIT (OUTPATIENT)
Dept: PODIATRY | Facility: CLINIC | Age: 38
End: 2023-08-08
Payer: COMMERCIAL

## 2023-08-08 VITALS
BODY MASS INDEX: 35.47 KG/M2 | HEIGHT: 66 IN | WEIGHT: 220.69 LBS | SYSTOLIC BLOOD PRESSURE: 114 MMHG | HEART RATE: 76 BPM | DIASTOLIC BLOOD PRESSURE: 75 MMHG

## 2023-08-08 DIAGNOSIS — B35.1 ONYCHOMYCOSIS: Primary | ICD-10-CM

## 2023-08-08 DIAGNOSIS — L60.9 DISEASE OF NAIL: ICD-10-CM

## 2023-08-08 PROCEDURE — 3078F PR MOST RECENT DIASTOLIC BLOOD PRESSURE < 80 MM HG: ICD-10-PCS | Mod: CPTII,S$GLB,, | Performed by: PODIATRIST

## 2023-08-08 PROCEDURE — 3078F DIAST BP <80 MM HG: CPT | Mod: CPTII,S$GLB,, | Performed by: PODIATRIST

## 2023-08-08 PROCEDURE — 1159F PR MEDICATION LIST DOCUMENTED IN MEDICAL RECORD: ICD-10-PCS | Mod: CPTII,S$GLB,, | Performed by: PODIATRIST

## 2023-08-08 PROCEDURE — 3074F SYST BP LT 130 MM HG: CPT | Mod: CPTII,S$GLB,, | Performed by: PODIATRIST

## 2023-08-08 PROCEDURE — 3008F BODY MASS INDEX DOCD: CPT | Mod: CPTII,S$GLB,, | Performed by: PODIATRIST

## 2023-08-08 PROCEDURE — 3008F PR BODY MASS INDEX (BMI) DOCUMENTED: ICD-10-PCS | Mod: CPTII,S$GLB,, | Performed by: PODIATRIST

## 2023-08-08 PROCEDURE — 99213 PR OFFICE/OUTPT VISIT, EST, LEVL III, 20-29 MIN: ICD-10-PCS | Mod: S$GLB,,, | Performed by: PODIATRIST

## 2023-08-08 PROCEDURE — 99999 PR PBB SHADOW E&M-EST. PATIENT-LVL III: ICD-10-PCS | Mod: PBBFAC,,, | Performed by: PODIATRIST

## 2023-08-08 PROCEDURE — 99999 PR PBB SHADOW E&M-EST. PATIENT-LVL III: CPT | Mod: PBBFAC,,, | Performed by: PODIATRIST

## 2023-08-08 PROCEDURE — 3074F PR MOST RECENT SYSTOLIC BLOOD PRESSURE < 130 MM HG: ICD-10-PCS | Mod: CPTII,S$GLB,, | Performed by: PODIATRIST

## 2023-08-08 PROCEDURE — 1159F MED LIST DOCD IN RCRD: CPT | Mod: CPTII,S$GLB,, | Performed by: PODIATRIST

## 2023-08-08 PROCEDURE — 99213 OFFICE O/P EST LOW 20 MIN: CPT | Mod: S$GLB,,, | Performed by: PODIATRIST

## 2023-08-17 NOTE — PROGRESS NOTES
Subjective:      Patient ID: Syed Lyon is a 37 y.o. female.    Chief Complaint:   Nail Problem (Fungus follow up) and Follow-up (3 month)    Syed is a 37 y.o. female who presents to the clinic complaining of thick and discolored toenails on both feet.  Doing well and noticing signs of improvement after 3 months of antifungal treatment.    Review of Systems   Constitutional: Negative for chills, decreased appetite, fever and malaise/fatigue.   HENT:  Negative for congestion, hearing loss, nosebleeds and tinnitus.    Eyes:  Negative for double vision, pain, photophobia and visual disturbance.   Cardiovascular:  Negative for chest pain, claudication, cyanosis and leg swelling.   Respiratory:  Negative for cough, hemoptysis, shortness of breath and wheezing.    Endocrine: Negative for cold intolerance and heat intolerance.   Hematologic/Lymphatic: Negative for adenopathy and bleeding problem.   Skin:  Positive for color change, dry skin and nail changes. Negative for itching and suspicious lesions.   Musculoskeletal:  Negative for arthritis, joint pain, myalgias and stiffness.   Gastrointestinal:  Negative for abdominal pain, jaundice, nausea and vomiting.   Genitourinary:  Negative for dysuria, frequency and hematuria.   Neurological:  Negative for difficulty with concentration, loss of balance, numbness, paresthesias and sensory change.   Psychiatric/Behavioral:  Negative for altered mental status, hallucinations and suicidal ideas. The patient is not nervous/anxious.    Allergic/Immunologic: Negative for environmental allergies and persistent infections.           Objective:      Physical Exam  Vitals reviewed.   Constitutional:       Appearance: She is well-developed.   HENT:      Head: Normocephalic and atraumatic.   Cardiovascular:      Pulses:           Dorsalis pedis pulses are 2+ on the right side and 2+ on the left side.        Posterior tibial pulses are 2+ on the right side and 2+ on the left side.    Pulmonary:      Effort: Pulmonary effort is normal.   Musculoskeletal:         General: Normal range of motion.      Comments: Inspection and palpation of the muscles joints and bones of both lower extremities reveal that muscle strength for the anterior lateral and posterior muscle groups and intrinsic muscle groups of the foot are all 5 over 5 symmetrical.  Ankle subtalar midtarsal and digital joint range of motion are within normal limits, nonpainful, without crepitus or effusion.  Patient exhibits a normal angle and base of gait.  Palpation of the tendons reveal no defects.   Skin:     General: Skin is warm and dry.      Capillary Refill: Capillary refill takes 2 to 3 seconds.      Comments: Scaling dryness in a moccasin distribution is noted to the bilateral lower extremities with associated erythema.    Improving onychomycosis changes with proximal clearing noted.     Neurological:      Mental Status: She is alert and oriented to person, place, and time.      Comments: Sharp dull light touch vibratory proprioceptive sensation are intact bilaterally.  Deep tendon reflexes to patellar and Achilles tendon are symmetrical 2 over 4 bilaterally.  No ankle clonus or Babinski reflexes noted bilaterally.  Coordination is normal to both feet and lower extremities.   Psychiatric:         Behavior: Behavior normal.               Assessment:       Encounter Diagnoses   Name Primary?    Onychomycosis Yes    Disease of nail      Independent visualization of imaging was performed.  Results were reviewed in detail with patient.       Plan:       Syed was seen today for nail problem and follow-up.    Diagnoses and all orders for this visit:    Onychomycosis    Disease of nail      I counseled the patient on her conditions, their implications and medical management.    The nature of the condition, options for management, as well as potential risks and complications were discussed in detail with patient. Patient was amenable  to my recommendations and left my office fully informed and will follow up as instructed or sooner if necessary.      Continue topical antifungal treatment has time.  Follow-up in 3-6 months.

## 2023-09-01 ENCOUNTER — PATIENT MESSAGE (OUTPATIENT)
Dept: OBSTETRICS AND GYNECOLOGY | Facility: CLINIC | Age: 38
End: 2023-09-01
Payer: COMMERCIAL

## 2023-09-01 RX ORDER — DROSPIRENONE 4 MG/1
4 TABLET, FILM COATED ORAL DAILY
Qty: 28 TABLET | Refills: 11 | Status: SHIPPED | OUTPATIENT
Start: 2023-09-01

## 2023-09-01 NOTE — TELEPHONE ENCOUNTER
Jazlyn sent but let her know if not covered at [facility] we may need to send to [facility]

## 2023-09-01 NOTE — TELEPHONE ENCOUNTER
So sorry to hear the news  Yes if she prefers we can send in her BC  What BC has she does the best on- we can send

## 2023-09-15 ENCOUNTER — PATIENT MESSAGE (OUTPATIENT)
Dept: OBSTETRICS AND GYNECOLOGY | Facility: CLINIC | Age: 38
End: 2023-09-15
Payer: COMMERCIAL

## 2023-09-16 ENCOUNTER — PATIENT MESSAGE (OUTPATIENT)
Dept: INTERNAL MEDICINE | Facility: CLINIC | Age: 38
End: 2023-09-16
Payer: COMMERCIAL

## 2023-09-16 DIAGNOSIS — E66.9 OBESITY, UNSPECIFIED CLASSIFICATION, UNSPECIFIED OBESITY TYPE, UNSPECIFIED WHETHER SERIOUS COMORBIDITY PRESENT: Primary | ICD-10-CM

## 2023-10-24 ENCOUNTER — TELEPHONE (OUTPATIENT)
Dept: SURGERY | Facility: CLINIC | Age: 38
End: 2023-10-24
Payer: COMMERCIAL

## 2023-11-29 NOTE — PROGRESS NOTES
Chief Complaint: well woman exam  Well Woman (Annual Exam)    She is established    Syed Lyon is a 34 y.o. female  presents for a well woman exam.    No c/o Daughter 3 y/o now    ROS:light to no period on giovanni UPT today was neg  No abdominal pain. No discharge  No breast pain or masses, No rectal bleeding       Cycles: light to no period on Giovanni  LMP: Patient's last menstrual period was 2020.  Contraception: The current method of family planning is OCP (estrogen/progesterone).  Meds per MD: Estryella    Last pap: 10/1/2019 Normal HPVneg  S/p cryo years ago  Last MMG:  none        Past Medical History:   Diagnosis Date    Abnormal Pap smear of cervix     cryo yrs. ago, then 3- hpv positive, pap normal, 2017 colpo ECC normal,     Endometriosis     per dx lap Bad endo per pt    HPV (human papilloma virus) infection     Infertility, female         PID (pelvic inflammatory disease)        Past Surgical History:   Procedure Laterality Date     SECTION      COLONOSCOPY N/A 2017    Procedure: COLONOSCOPY;  Surgeon: Jose Zaidi MD;  Location: [facility] ([address] [place] [address]);  Service: Endoscopy;  Laterality: N/A;    fallopian tube removal Bilateral     GYNECOLOGIC CRYOSURGERY      HYSTEROSCOPY      2015    In Vitro      Dr Anuja Santana       OB History    Para Term  AB Living   1 1   1   1   SAB TAB Ectopic Multiple Live Births         0 1      # Outcome Date GA Lbr Dustin/2nd Weight Sex Delivery Anes PTL Lv   1  16 35w5d  2.608 kg (5 lb 12 oz) F CS-LTranv Spinal N OCTAVIANO       Family History   Problem Relation Age of Onset    Heart disease Father     No Known Problems Mother     No Known Problems Daughter     Meningitis Brother     Breast cancer Neg Hx     Colon cancer Neg Hx     Ovarian cancer Neg Hx     Cancer Neg Hx     Colon polyps Neg Hx     Celiac disease Neg Hx     Esophageal cancer Neg Hx     Stomach cancer Neg Hx   "   Irritable bowel syndrome Neg Hx     Inflammatory bowel disease Neg Hx        Social History     Tobacco Use    Smoking status: Never Smoker    Smokeless tobacco: Never Used   Substance Use Topics    Alcohol use: Yes     Frequency: Monthly or less     Drinks per session: 1 or 2     Binge frequency: Never     Comment: occasional    Drug use: No         ROS:  GENERAL: Denies weight gain or weight loss. Feeling well overall.   SKIN: Denies rash or lesions.   HEENT: Denies headaches, or vision changes.   CARDIOVASCULAR: Denies palpitations or left sided chest pain.   RESPIRATORY: Denies shortness of breath or dyspnea on exertion.  BREASTS: Denies pain, lumps, or nipple discharge.   ABDOMEN: Denies abdominal pain, constipation, diarrhea, nausea, vomiting, change in appetite or rectal bleeding.   URINARY: Denies frequency, dysuria, hematuria.  NEUROLOGIC: Denies syncope or weakness.   PSYCHIATRIC: Denies depression, anxiety or mood swings.    Physical Exam:  /84   Ht 5' 6" (1.676 m)   Wt 92 kg (202 lb 13.2 oz)   LMP 09/08/2020   BMI 32.74 kg/m²     APPEARANCE: Well nourished, well developed, in no acute distress.  AFFECT: WNL, alert and oriented x 3  SKIN: No acne or hirsutism  BREASTS: Symmetrical, no skin changes.                      No nipple discharge.   No palpable masses bilaterally  NODES: No inguinal nor axillary LAD  ABDOMEN: soft Non tender Non distended No masses  PELVIC:   Normal external genitalia without lesions.  Normal hair distribution.   Adequate perineal body, normal urethral meatus.   No signif cystocele or rectocele.  Vagina moist and well rugated without lesions or discharge.    Cervix pink, without lesions, discharge or tenderness.     PAP performed   Bimanual exam shows uterus to be normal size, regular, mobile and nontender.    Adnexa without masses or tenderness.    EXTREMITIES: No edema.        ASSESSMENT AND PLAN  Ganea was seen today for well woman.    Diagnoses and all " orders for this visit:    Encounter for gynecological examination  -     norgestimate-ethinyl estradioL (ORTHO-CYCLEN) 0.25-35 mg-mcg per tablet; Take 1 tablet by mouth once daily.    Screening examination for STD (sexually transmitted disease)  -     C. trachomatis/N. gonorrhoeae by AMP DNA    Missed menses  -     POCT urine pregnancy        Patient was counseled today on A.C.S. Pap guidelines and recommendations for yearly pelvic exams, mammograms and monthly self breast exams; to see her PCP for other health maintenance.     Patient encouraged to register for portal and results will be sent via portal.       Health Maintenance   Topic Date Due    TETANUS VACCINE  11/07/2004    Pap Smear  09/25/2020    Hepatitis C Screening  Completed    Lipid Panel  Completed        ----------------------------------------------------------------   Electronically signed by Morgan Campos MD(Interpreting   physician) on 09/01/2023 11:20 AM   ----------------------------------------------------------------     M-Mode/2D Measurements & Calculations      LV Diastolic    LV Systolic Dimension: 4.5   AV Cusp Separation: 1.4 cmLA   Dimension: 5.3  cm                           Dimension: 4.4 cmAO Root   cm              LV Volume Diastolic: 651.0   Dimension: 2.8 cm   LV FS:15.1 %    ml   LV PW           LV Volume Systolic: 21.6 ml   Diastolic: 1 cm LV EDV/LV EDV Index: 135.5   Septum          ml/86 ml/m^2LV ESV/LV ESV    RV Diastolic Dimension: 1.9   Diastolic: 1 cm Index: 79.7 ml/60ml/ m^2     cm                   EF Calculated: 31 %   LV Mass: 200.4  LV Mass Index: 128 l/min*m^2   g               LV Length: 8.1 cm            LA volume/Index: 50.6 ml     Doppler Measurements & Calculations      MV Peak E-Wave: 0.59 m/s                      AV Peak Velocity: 1.6 m/s   MV Peak A-Wave: 0.88 m/s                      AV Peak Gradient: 10.18   MV E/A Ratio: 0.68                            mmHg   MV Peak Gradient: 3.6 mmHg                    AV Mean Velocity: 1.14 m/s   MV Mean Gradient: 1.3 mmHg                    AV Mean Gradient: 5.2 mmHg   MV Mean Velocity: 0.53 m/s                    AV VTI: 25.2 cm   MV Deceleration Time: 235.7 msec                                                 AV Deceleration Time:                                                 1939.2 msec      MR Velocity: 4.46 m/s   MV FITZ PISA: 0.23 cm^2   MR VTI: 168.7 cm   Alias Velocity: 0.25 m/sPISA Radius: 0.8 cm      PISA area: 4.02 cm^2MR flow rate: 102.11   ml/sMR volume:38.8 ml       Cardiac MRI: 6/13/23: IMPRESSION:  1. Mildly dilated left ventricle with severe LV systolic dysfunction,  LVEF 31%. 2.  No regional wall motion abnormalities. 3.  Normal RV size and function.   4.  At least moderate mitral regurgitation and mild

## 2024-01-10 ENCOUNTER — PATIENT MESSAGE (OUTPATIENT)
Dept: OBSTETRICS AND GYNECOLOGY | Facility: CLINIC | Age: 39
End: 2024-01-10
Payer: COMMERCIAL

## 2024-02-01 ENCOUNTER — LAB VISIT (OUTPATIENT)
Dept: LAB | Facility: HOSPITAL | Age: 39
End: 2024-02-01
Attending: INTERNAL MEDICINE
Payer: COMMERCIAL

## 2024-02-01 ENCOUNTER — OFFICE VISIT (OUTPATIENT)
Dept: INTERNAL MEDICINE | Facility: CLINIC | Age: 39
End: 2024-02-01
Payer: COMMERCIAL

## 2024-02-01 DIAGNOSIS — M25.519 SHOULDER PAIN, UNSPECIFIED CHRONICITY, UNSPECIFIED LATERALITY: Primary | ICD-10-CM

## 2024-02-01 DIAGNOSIS — R53.83 FATIGUE, UNSPECIFIED TYPE: ICD-10-CM

## 2024-02-01 LAB
ALBUMIN SERPL BCP-MCNC: 4.2 G/DL (ref 3.5–5.2)
ALP SERPL-CCNC: 52 U/L (ref 55–135)
ALT SERPL W/O P-5'-P-CCNC: 10 U/L (ref 10–44)
ANION GAP SERPL CALC-SCNC: 10 MMOL/L (ref 8–16)
AST SERPL-CCNC: 13 U/L (ref 10–40)
BASOPHILS # BLD AUTO: 0.04 K/UL (ref 0–0.2)
BASOPHILS NFR BLD: 0.7 % (ref 0–1.9)
BILIRUB SERPL-MCNC: 0.7 MG/DL (ref 0.1–1)
BUN SERPL-MCNC: 11 MG/DL (ref 6–20)
CALCIUM SERPL-MCNC: 9.8 MG/DL (ref 8.7–10.5)
CHLORIDE SERPL-SCNC: 107 MMOL/L (ref 95–110)
CO2 SERPL-SCNC: 23 MMOL/L (ref 23–29)
CREAT SERPL-MCNC: 0.8 MG/DL (ref 0.5–1.4)
DIFFERENTIAL METHOD BLD: ABNORMAL
EOSINOPHIL # BLD AUTO: 0.3 K/UL (ref 0–0.5)
EOSINOPHIL NFR BLD: 5.4 % (ref 0–8)
ERYTHROCYTE [DISTWIDTH] IN BLOOD BY AUTOMATED COUNT: 15.9 % (ref 11.5–14.5)
EST. GFR  (NO RACE VARIABLE): >60 ML/MIN/1.73 M^2
FERRITIN SERPL-MCNC: 24 NG/ML (ref 20–300)
GLUCOSE SERPL-MCNC: 86 MG/DL (ref 70–110)
HCT VFR BLD AUTO: 39.2 % (ref 37–48.5)
HGB BLD-MCNC: 12.1 G/DL (ref 12–16)
IMM GRANULOCYTES # BLD AUTO: 0.01 K/UL (ref 0–0.04)
IMM GRANULOCYTES NFR BLD AUTO: 0.2 % (ref 0–0.5)
LYMPHOCYTES # BLD AUTO: 1.8 K/UL (ref 1–4.8)
LYMPHOCYTES NFR BLD: 33.8 % (ref 18–48)
MCH RBC QN AUTO: 22.7 PG (ref 27–31)
MCHC RBC AUTO-ENTMCNC: 30.9 G/DL (ref 32–36)
MCV RBC AUTO: 74 FL (ref 82–98)
MONOCYTES # BLD AUTO: 0.5 K/UL (ref 0.3–1)
MONOCYTES NFR BLD: 9.6 % (ref 4–15)
NEUTROPHILS # BLD AUTO: 2.7 K/UL (ref 1.8–7.7)
NEUTROPHILS NFR BLD: 50.3 % (ref 38–73)
NRBC BLD-RTO: 0 /100 WBC
PLATELET # BLD AUTO: 232 K/UL (ref 150–450)
PMV BLD AUTO: 12.2 FL (ref 9.2–12.9)
POTASSIUM SERPL-SCNC: 3.6 MMOL/L (ref 3.5–5.1)
PROT SERPL-MCNC: 7.9 G/DL (ref 6–8.4)
RBC # BLD AUTO: 5.32 M/UL (ref 4–5.4)
SODIUM SERPL-SCNC: 140 MMOL/L (ref 136–145)
TSH SERPL DL<=0.005 MIU/L-ACNC: 1.01 UIU/ML (ref 0.4–4)
WBC # BLD AUTO: 5.39 K/UL (ref 3.9–12.7)

## 2024-02-01 PROCEDURE — 3074F SYST BP LT 130 MM HG: CPT | Mod: CPTII,S$GLB,, | Performed by: INTERNAL MEDICINE

## 2024-02-01 PROCEDURE — 99999 PR PBB SHADOW E&M-EST. PATIENT-LVL V: CPT | Mod: PBBFAC,,, | Performed by: INTERNAL MEDICINE

## 2024-02-01 PROCEDURE — 99214 OFFICE O/P EST MOD 30 MIN: CPT | Mod: S$GLB,,, | Performed by: INTERNAL MEDICINE

## 2024-02-01 PROCEDURE — 84443 ASSAY THYROID STIM HORMONE: CPT | Performed by: INTERNAL MEDICINE

## 2024-02-01 PROCEDURE — 82728 ASSAY OF FERRITIN: CPT | Performed by: INTERNAL MEDICINE

## 2024-02-01 PROCEDURE — 80053 COMPREHEN METABOLIC PANEL: CPT | Performed by: INTERNAL MEDICINE

## 2024-02-01 PROCEDURE — 36415 COLL VENOUS BLD VENIPUNCTURE: CPT | Performed by: INTERNAL MEDICINE

## 2024-02-01 PROCEDURE — 85025 COMPLETE CBC W/AUTO DIFF WBC: CPT | Performed by: INTERNAL MEDICINE

## 2024-02-01 PROCEDURE — 1159F MED LIST DOCD IN RCRD: CPT | Mod: CPTII,S$GLB,, | Performed by: INTERNAL MEDICINE

## 2024-02-01 PROCEDURE — 3078F DIAST BP <80 MM HG: CPT | Mod: CPTII,S$GLB,, | Performed by: INTERNAL MEDICINE

## 2024-02-01 PROCEDURE — 3008F BODY MASS INDEX DOCD: CPT | Mod: CPTII,S$GLB,, | Performed by: INTERNAL MEDICINE

## 2024-02-04 VITALS
OXYGEN SATURATION: 96 % | WEIGHT: 220.44 LBS | BODY MASS INDEX: 35.43 KG/M2 | TEMPERATURE: 99 F | HEART RATE: 80 BPM | HEIGHT: 66 IN | SYSTOLIC BLOOD PRESSURE: 114 MMHG | DIASTOLIC BLOOD PRESSURE: 78 MMHG

## 2024-02-05 ENCOUNTER — HOSPITAL ENCOUNTER (OUTPATIENT)
Dept: RADIOLOGY | Facility: HOSPITAL | Age: 39
Discharge: HOME | End: 2024-02-05
Attending: PHYSICIAN ASSISTANT
Payer: COMMERCIAL

## 2024-02-05 ENCOUNTER — OFFICE VISIT (OUTPATIENT)
Dept: SPORTS MEDICINE | Facility: CLINIC | Age: 39
End: 2024-02-05
Payer: COMMERCIAL

## 2024-02-05 VITALS
HEART RATE: 76 BPM | SYSTOLIC BLOOD PRESSURE: 111 MMHG | DIASTOLIC BLOOD PRESSURE: 70 MMHG | WEIGHT: 223.44 LBS | HEIGHT: 66 IN | BODY MASS INDEX: 35.91 KG/M2

## 2024-02-05 DIAGNOSIS — M25.512 CHRONIC LEFT SHOULDER PAIN: Primary | ICD-10-CM

## 2024-02-05 DIAGNOSIS — G89.29 CHRONIC LEFT SHOULDER PAIN: Primary | ICD-10-CM

## 2024-02-05 DIAGNOSIS — M75.102 ROTATOR CUFF SYNDROME, LEFT: ICD-10-CM

## 2024-02-05 DIAGNOSIS — M25.512 ACUTE PAIN OF LEFT SHOULDER: ICD-10-CM

## 2024-02-05 PROCEDURE — 1159F MED LIST DOCD IN RCRD: CPT | Mod: CPTII,S$GLB,, | Performed by: PHYSICIAN ASSISTANT

## 2024-02-05 PROCEDURE — 3008F BODY MASS INDEX DOCD: CPT | Mod: CPTII,S$GLB,, | Performed by: PHYSICIAN ASSISTANT

## 2024-02-05 PROCEDURE — 3078F DIAST BP <80 MM HG: CPT | Mod: CPTII,S$GLB,, | Performed by: PHYSICIAN ASSISTANT

## 2024-02-05 PROCEDURE — 99204 OFFICE O/P NEW MOD 45 MIN: CPT | Mod: S$GLB,,, | Performed by: PHYSICIAN ASSISTANT

## 2024-02-05 PROCEDURE — 1160F RVW MEDS BY RX/DR IN RCRD: CPT | Mod: CPTII,S$GLB,, | Performed by: PHYSICIAN ASSISTANT

## 2024-02-05 PROCEDURE — 99999 PR PBB SHADOW E&M-EST. PATIENT-LVL V: CPT | Mod: PBBFAC,,, | Performed by: PHYSICIAN ASSISTANT

## 2024-02-05 PROCEDURE — 73030 X-RAY EXAM OF SHOULDER: CPT | Mod: 26,LT,, | Performed by: RADIOLOGY

## 2024-02-05 PROCEDURE — 3074F SYST BP LT 130 MM HG: CPT | Mod: CPTII,S$GLB,, | Performed by: PHYSICIAN ASSISTANT

## 2024-02-05 PROCEDURE — 73030 X-RAY EXAM OF SHOULDER: CPT | Mod: TC,LT

## 2024-02-05 RX ORDER — CELECOXIB 200 MG/1
200 CAPSULE ORAL 2 TIMES DAILY
Qty: 60 CAPSULE | Refills: 1 | Status: SHIPPED | OUTPATIENT
Start: 2024-02-05

## 2024-02-05 NOTE — PROGRESS NOTES
Subjective:       Patient ID: Syed Lyon is a 38 y.o. female.    Chief Complaint: Fatigue    HPI  She complains of fatigue.  Denies chest pain.  No shortness of breath.  Denies blood in stool.  She does complain of pain located in her left shoulder.  Pain has been present for over a week.  Denies any acute trauma.  No relief with Tylenol.  Described as an ache.  4/10 in intensity     Medications:  See medication list     Social history:  Does not smoke, does not drink alcohol       Review of Systems   Constitutional:  Negative for chills, fatigue, fever and unexpected weight change.   Respiratory:  Negative for chest tightness and shortness of breath.    Cardiovascular:  Negative for chest pain and palpitations.   Gastrointestinal:  Negative for abdominal pain and blood in stool.   Neurological:  Negative for dizziness, syncope, numbness and headaches.       Objective:      Physical Exam  HENT:      Right Ear: External ear normal.      Left Ear: External ear normal.      Nose: Nose normal.      Mouth/Throat:      Mouth: Mucous membranes are moist.      Pharynx: Oropharynx is clear.   Eyes:      Pupils: Pupils are equal, round, and reactive to light.   Cardiovascular:      Rate and Rhythm: Normal rate and regular rhythm.      Heart sounds: No murmur heard.  Pulmonary:      Breath sounds: Normal breath sounds.   Abdominal:      General: There is no distension.      Palpations: There is no hepatomegaly or splenomegaly.      Tenderness: There is no abdominal tenderness.   Musculoskeletal:      Cervical back: Normal range of motion.   Lymphadenopathy:      Cervical: No cervical adenopathy.      Upper Body:      Right upper body: No axillary adenopathy.      Left upper body: No axillary adenopathy.   Neurological:      Cranial Nerves: No cranial nerve deficit.      Sensory: No sensory deficit.      Motor: Motor function is intact.      Deep Tendon Reflexes: Reflexes are normal and symmetric.         Assessment/Plan        Assessment and plan:  1.  Shoulder pain:  Uncontrolled.  Continue Tylenol.  Schedule sports medicine appointment   2. Fatigue: Check CMP, CBC, ferritin, TSH
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
## 2024-02-23 ENCOUNTER — HOSPITAL ENCOUNTER (OUTPATIENT)
Dept: RADIOLOGY | Facility: HOSPITAL | Age: 39
Discharge: HOME | End: 2024-02-23
Attending: PHYSICIAN ASSISTANT
Payer: COMMERCIAL

## 2024-02-23 DIAGNOSIS — M25.512 CHRONIC LEFT SHOULDER PAIN: ICD-10-CM

## 2024-02-23 DIAGNOSIS — G89.29 CHRONIC LEFT SHOULDER PAIN: ICD-10-CM

## 2024-02-23 PROCEDURE — 25500020 PHARM REV CODE 255: Performed by: PHYSICIAN ASSISTANT

## 2024-02-23 PROCEDURE — 73222 MRI JOINT UPR EXTREM W/DYE: CPT | Mod: 26,LT,, | Performed by: RADIOLOGY

## 2024-02-23 PROCEDURE — 77002 NEEDLE LOCALIZATION BY XRAY: CPT | Mod: 26,LT,, | Performed by: INTERNAL MEDICINE

## 2024-02-23 PROCEDURE — 73222 MRI JOINT UPR EXTREM W/DYE: CPT | Mod: TC,LT

## 2024-02-23 PROCEDURE — A9585 GADOBUTROL INJECTION: HCPCS | Performed by: PHYSICIAN ASSISTANT

## 2024-02-23 PROCEDURE — 23350 INJECTION FOR SHOULDER X-RAY: CPT | Mod: LT,,, | Performed by: INTERNAL MEDICINE

## 2024-02-23 PROCEDURE — 25000003 PHARM REV CODE 250: Performed by: PHYSICIAN ASSISTANT

## 2024-02-23 RX ORDER — GADOBUTROL 604.72 MG/ML
6 INJECTION INTRAVENOUS
Status: COMPLETED | OUTPATIENT
Start: 2024-02-23 | End: 2024-02-23

## 2024-02-23 RX ORDER — LIDOCAINE HYDROCHLORIDE 10 MG/ML
2 INJECTION INFILTRATION; PERINEURAL ONCE
Status: COMPLETED | OUTPATIENT
Start: 2024-02-23 | End: 2024-02-23

## 2024-02-23 RX ADMIN — GADOBUTROL 6 ML: 604.72 INJECTION INTRAVENOUS at 03:02

## 2024-02-23 RX ADMIN — LIDOCAINE HYDROCHLORIDE 2 ML: 10 INJECTION, SOLUTION INFILTRATION; PERINEURAL at 03:02

## 2024-02-23 RX ADMIN — IOHEXOL 6 ML: 300 INJECTION, SOLUTION INTRAVENOUS at 03:02

## 2024-02-26 ENCOUNTER — OFFICE VISIT (OUTPATIENT)
Dept: SPORTS MEDICINE | Facility: CLINIC | Age: 39
End: 2024-02-26
Payer: COMMERCIAL

## 2024-02-26 VITALS
BODY MASS INDEX: 35.58 KG/M2 | WEIGHT: 220.44 LBS | HEART RATE: 76 BPM | SYSTOLIC BLOOD PRESSURE: 113 MMHG | DIASTOLIC BLOOD PRESSURE: 73 MMHG

## 2024-02-26 DIAGNOSIS — M25.512 CHRONIC LEFT SHOULDER PAIN: ICD-10-CM

## 2024-02-26 DIAGNOSIS — G89.29 CHRONIC LEFT SHOULDER PAIN: ICD-10-CM

## 2024-02-26 DIAGNOSIS — M75.22 BICEPS TENDONITIS, LEFT: ICD-10-CM

## 2024-02-26 DIAGNOSIS — S43.432A SUPERIOR LABRUM ANTERIOR-TO-POSTERIOR (SLAP) TEAR OF LEFT SHOULDER: Primary | ICD-10-CM

## 2024-02-26 PROCEDURE — 99214 OFFICE O/P EST MOD 30 MIN: CPT | Mod: S$GLB,,, | Performed by: PHYSICIAN ASSISTANT

## 2024-02-26 PROCEDURE — 3074F SYST BP LT 130 MM HG: CPT | Mod: CPTII,S$GLB,, | Performed by: PHYSICIAN ASSISTANT

## 2024-02-26 PROCEDURE — 3008F BODY MASS INDEX DOCD: CPT | Mod: CPTII,S$GLB,, | Performed by: PHYSICIAN ASSISTANT

## 2024-02-26 PROCEDURE — 1159F MED LIST DOCD IN RCRD: CPT | Mod: CPTII,S$GLB,, | Performed by: PHYSICIAN ASSISTANT

## 2024-02-26 PROCEDURE — 99999 PR PBB SHADOW E&M-EST. PATIENT-LVL IV: CPT | Mod: PBBFAC,,, | Performed by: PHYSICIAN ASSISTANT

## 2024-02-26 PROCEDURE — 3078F DIAST BP <80 MM HG: CPT | Mod: CPTII,S$GLB,, | Performed by: PHYSICIAN ASSISTANT

## 2024-02-26 PROCEDURE — 1160F RVW MEDS BY RX/DR IN RCRD: CPT | Mod: CPTII,S$GLB,, | Performed by: PHYSICIAN ASSISTANT
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
## 2024-02-28 ENCOUNTER — TELEPHONE (OUTPATIENT)
Dept: BARIATRICS | Facility: CLINIC | Age: 39
End: 2024-02-28
Payer: COMMERCIAL

## 2024-02-29 ENCOUNTER — OFFICE VISIT (OUTPATIENT)
Dept: BARIATRICS | Facility: CLINIC | Age: 39
End: 2024-02-29
Payer: COMMERCIAL

## 2024-02-29 VITALS
WEIGHT: 219.81 LBS | HEIGHT: 66 IN | OXYGEN SATURATION: 100 % | SYSTOLIC BLOOD PRESSURE: 110 MMHG | DIASTOLIC BLOOD PRESSURE: 63 MMHG | BODY MASS INDEX: 35.32 KG/M2 | HEART RATE: 95 BPM

## 2024-02-29 DIAGNOSIS — M25.512 CHRONIC LEFT SHOULDER PAIN: ICD-10-CM

## 2024-02-29 DIAGNOSIS — G89.29 CHRONIC LEFT SHOULDER PAIN: ICD-10-CM

## 2024-02-29 DIAGNOSIS — M25.562 CHRONIC PAIN OF BOTH KNEES: ICD-10-CM

## 2024-02-29 DIAGNOSIS — E66.9 OBESITY, CLASS II, BMI 35-39.9, NO COMORBIDITY: Primary | ICD-10-CM

## 2024-02-29 DIAGNOSIS — M25.561 CHRONIC PAIN OF BOTH KNEES: ICD-10-CM

## 2024-02-29 DIAGNOSIS — G89.29 CHRONIC PAIN OF BOTH KNEES: ICD-10-CM

## 2024-02-29 PROBLEM — E66.812 OBESITY, CLASS II, BMI 35-39.9, NO COMORBIDITY: Status: ACTIVE | Noted: 2024-02-29

## 2024-02-29 PROCEDURE — 1159F MED LIST DOCD IN RCRD: CPT | Mod: CPTII,S$GLB,, | Performed by: INTERNAL MEDICINE

## 2024-02-29 PROCEDURE — 3008F BODY MASS INDEX DOCD: CPT | Mod: CPTII,S$GLB,, | Performed by: INTERNAL MEDICINE

## 2024-02-29 PROCEDURE — 99215 OFFICE O/P EST HI 40 MIN: CPT | Mod: S$GLB,,, | Performed by: INTERNAL MEDICINE

## 2024-02-29 PROCEDURE — 1160F RVW MEDS BY RX/DR IN RCRD: CPT | Mod: CPTII,S$GLB,, | Performed by: INTERNAL MEDICINE

## 2024-02-29 PROCEDURE — 3074F SYST BP LT 130 MM HG: CPT | Mod: CPTII,S$GLB,, | Performed by: INTERNAL MEDICINE

## 2024-02-29 PROCEDURE — 99999 PR PBB SHADOW E&M-EST. PATIENT-LVL V: CPT | Mod: PBBFAC,,, | Performed by: INTERNAL MEDICINE

## 2024-02-29 PROCEDURE — 3078F DIAST BP <80 MM HG: CPT | Mod: CPTII,S$GLB,, | Performed by: INTERNAL MEDICINE

## 2024-02-29 RX ORDER — PHENTERMINE AND TOPIRAMATE 3.75; 23 MG/1; MG/1
1 CAPSULE, EXTENDED RELEASE ORAL DAILY
Qty: 14 CAPSULE | Refills: 0 | Status: SHIPPED | OUTPATIENT
Start: 2024-02-29 | End: 2024-03-14

## 2024-02-29 RX ORDER — PHENTERMINE AND TOPIRAMATE 7.5; 46 MG/1; MG/1
1 CAPSULE, EXTENDED RELEASE ORAL DAILY
Qty: 30 CAPSULE | Refills: 3 | Status: SHIPPED | OUTPATIENT
Start: 2024-02-29 | End: 2024-03-30

## 2024-02-29 NOTE — PATIENT INSTRUCTIONS
"Patient warned of common side effects of phentermine/topiraimate including anxiety, insomnia, palpitations and increased blood pressure.  She was informed of the potential side effects such as serious and possibly fatal rash in which case the medication should be discontinued immediately. Paresthesias, forgetfulness, fatigue, kidney stones, GI symptoms, and changes in lab values such as electrolytes, blood counts and kidney function.that stopping the medication without making lifestyle changes will result in regain of weight.  Patient states understanding.    Weight loss medications are controlled substances.  They require routine follow up. Prescription or pills that are lost or destroyed will not be replaced.       Www[employer] [employer] ([employer]) for coupon.       Increase low impact activity as cleared by PT.  Avoid high impact activity, very heavy lifting or other exercise regimens that may cause discomfort.                     1000 calorie  Meal Plan  STARCHES 80 CALORIES PER SERVING 15g CARB, 3g PROTEIN, 1g FAT  Servings per day   bread   tortilla   crackers   cooked cereals   dry cereals   pasta   rice   corn   popcorn   potato (small)   potato, mashed   sweet potato   squash, winter   cooked beans, peas, lentils (add 1 meat exchange)   1 slice   1 (6")   4-6 (3/4 oz)   1/2 cup   3/4 cup   1/2 cup   1/3 cup  1/2 cup   1 small light bag  1 (3 oz)   1/2 cup   1/3 cup   1 cup   1/2 cup Most starches are a good source of B vitamins   Choose whole grain foods such as 100% whole wheat bread and flour, brown rice, tortillas, etc. for nutrients and fiber.   Combine beans (starch & meat) with grains (starch) for their complimentary proteins and fiber   Combine grains (starch) with milk (milk) or cheese (meat) to compliment proteins     2   FRUIT 60 CALORIES PER SERVING 15g CARB    fresh fruit   banana  melon (cubes)   berries  canned fruit   dried fruit    1 small   1/2  ½ cup   ¾ cup  ½ cup   ¼ cup    Choose whole fruits for fiber "   No fruit juices   2   DAIRY  CALORIES PER SERVING 12g CARB, 8g PROTEIN, 0-8g FAT    milk   yogurt  Protein soy or almond milk 1 cup   1 cup   1 cup Use unsweetened almond or soy milk with added protein  Avoid chocolate or flavored milk  Avoid yogurt with more than 8 gms of sugar. Gms of protein should be higher than grams of sugar               1   MEAT AND SUBSTITUES  CALORIES PER SERVING 7g Protein, 0-13g FAT    Seafood, met and fish   cheese   cottage cheese   egg   peanut butter   tofu or tempeh  cooked beans, peas, lentils (add 1 starch)  Quinoa (add 1 starch)   Nuts and seeds (½ serving protein + 1 fat)  Nutritional yeast  Morning Star grillers 1 oz     1 oz  1/4 cup     1   1.5 Tbsp   4 oz (1/2 cup)   1/2 cup              ¼ cup            2 tablespoons    1 umu or ½ cup      Choose fish, seafood and lower fat cheeses  Limit frying or adding fat.      8   FATS 45 CALORIES PER SERVING     oil   mayonnaise   cream cheese   salad dressing   peanuts   avocado   butter or margarine    1 tsp   1 tsp   1 Tbsp   1 Tbsp   10   1/8   1 tsp Eat less fat.   Eat less saturated fat such as animal fat found in fatter meat, cheese, and butter. Also eat less hydrogenated fat.      2-3   VEGETABLES 25 CALORIES PER SERVING 5 g CARB, 2g PROTEIN    raw vegetables   cooked vegetables   tomato or vegetable juice 1 cup   1/2 cup     1/2 cup Choose dark green leafy and deep yellow vegetables such as spinach, zuchinni, squash, mushrooms, cauliflower ,broccoli, carrots, and peppers.   Unlimited       1000 CALORIE MEAL PLAN  Eat 3 small meals per day with 1-2 small snacks to keep you full throughout the day  Aim for at least 15 gms of protein at breakfast, at least 25 grams at lunch and at least 25 grams of protein at dinner.  Snacks should contain at least 5 grams of protein  Limit starches to 1 serving per meal or snack.  Keep your carbs whole grain or whole wheat  Limit your intake of refined sugar including sugary  beverages ie sweet tea, lemonade, fruit punch             Fruit Protein Dairy Starch Fats Calories   Breakfast 1 2    370   Lunch  3  1 1 290   Dinner 1 3  1 1 315   Snack   1   120   Total 2 8 1 2 2 1085     Sample breakfasts:  2 scrambled eggs (use spray), 1 cup Protein Silk soy milk, 1 slice whole wheat toast, 1 small orange  Omelet made with 1 egg, 1-ounce low fat cheese and non-starchy veggies, 1 low fat plain yogurt with ½ banana  Plain yogurt with ¾ cup unsweetened cold cereal and ½ cup fresh fruit salad, 2 hardboiled eggs  Sample lunches:  ½ whole wheat bagel topped with 3 ounces of low fat cheese melted in oven with a wild green salad with 1 TBSP dressing  ¾ cup cooked beans with 6 whole grain crackers, 10 roasted peanuts  ½ medium baked potato with 3 ounces of low fat cheddar cheese and 1 TBSP  sour cream  1 small wheat tortilla brushed with 1 tsp olive oil then brushed with pizza sauce and 4 ounces low fat cheese, sliced mushrooms and green peppers broiled until cheese is melted.  ½ cup chopped melon    Sample Dinners:  4 ounces of tuna pan seared in 1 tsp olive oil, ½ baked small sweet potato and 2 small plums  ½ cup chick peas, 1 cup cauliflower sauteed with 1 tbsp chopped onion, 1 tsp oil, and 2 tsp grider powder. Add low sodium vegetable stock to desired consistency. Serve with ½ cup brown rice  Red beans seasoned with onions and bell peppers served over cauliflower rice  1 cup cooked green or brown lentils served with ½ cup cooked quinoa and chopped tomatoes and cucumbers and 1 tbsp feta cheese  Sample Snacks:  1 cup of Protein Silk Soy milk with 3 squares marck crackers  3/4 ounce Triscuits with 1 ounce cubed cheese  Chobani Triple Zero yogurt with ¾ cup unsweetened cereal  ½ cup edamame (shelled)      Meal Ideas for Regular Bariatric Diet  *Recipes and products available at www.bariatriceating.com      Breakfast: (15-20g protein)    - Egg white omelet: 2 egg whites or ½ cup Egg Beaters. (Optional  proteins: cheese, shrimp, black beans, chicken, sliced turkey) (Optional veggies: tomatoes, salsa, spinach, mushrooms, onions, green peppers, or small slice avocado)     - Egg and sausage: 1 egg or ¼ cup Egg Beaters (any variety), with 1 umu or 2 links of Turkey sausage or Veggie breakfast sausage (Dereck Farms or Kaymu)    - Crust-less breakfast quiche: To make a glass pie dish, mix 4oz part skim Ricotta, 1 cup skim milk, and 2 eggs as your base. Add protein: shredded cheese, sliced lean ham or turkey, turkey ritter/sausage. Add veggies: tomato, onion, green onion, mushroom, green pepper, spinach, etc.    - Yogurt parfait: Mix 1 - 6oz container Dannon Light N Fit vanilla yogurt, with ¼ cup Kashi Go Lean cereal    - Cottage cheese and fruit: ½ cup part-skim cottage cheese or ricotta cheese topped with fresh fruit or sugar free preserves     - Jyoti Keyes's Vanilla Egg custard* (add 2 Tbsp instant coffee granules to make Cappuccino Custard*)    - Hi-Protein café latte (skim milk, decaf coffee, 1 scoop protein powder). Optional to add Sugar free syrup or extract flavoring.    Lunch: (20-30g protein)    - ½ cup Black bean soup (Homemade or Progresso), with ¼ cup shredded low-fat cheese. Top with chopped tomato or fresh salsa.     - Lean deli turkey breast and low-fat sliced cheese, mustard or light alvarez to moisten, rolled up together, or wrapped in a James lettuce leaf    - Chicken salad made from dinner leftovers, moisten with low-fat salad dressing or light alvarez. Also try leftover salmon, shrimp, tuna or boiled eggs. Serve ½ cup over dark green salad    - Fat-free canned refried beans, topped with ¼ cup shredded low-fat cheese. Top with chopped tomato or fresh salsa.     - Greek salad: Top mixed greens with 1-2oz grilled chicken, tomatoes, red onions, 2-3 kalamata olives, and sprinkle lightly with feta cheese. Spritz with Balsamic vinegar to taste.     - Crust-less lunch quiche: To make a glass pie dish, mix  4oz part skim Ricotta, 1 cup skim milk, and 2 eggs as your base. Add protein: shredded cheese, sliced lean ham or turkey, shrimp, chicken. Add veggies: tomato, onion, green onion, mushroom, green pepper, spinach, artichoke, broccoli, etc.    - Pizza bake: tomato sauce, low-fat shredded mozzarella and turkey pepperoni or Angolan ritter. Add any veggies.    - Cucumber crab bites: Spread ¼ cup crab dip (lump crabmeat + light cream cheese and green onions) over sliced cucumber.     - Chicken with light spinach and artichoke dip*: Puree in : 6oz cooked and drained spinach, 2 cloves garlic, 1 can cannelloni beans, ½ cup chopped green onions, 1 can drained artichoke hearts (not marinated in oil), lemon juice and basil. Mix in 2oz chopped up chicken.    Supper: (20-30g protein)    - Serve grilled fish over dark green salad tossed with low-fat dressing, served with grilled asparagus wan     - Rotisserie chicken salad: served with sliced strawberries, walnuts, fat-free feta cheese crumbles and 1 tbsp Glasgows Own Light Raspberry [place] Vinaigrette    - Shrimp cocktail: Dip cold boiled shrimp in homemade low-sugar cocktail sauce (1/2 cup Darrell One Carb ketchup, 2 tbsp horseradish, 1/4 tsp hot sauce, 1 tsp Worcestershire sauce, 1 tbsp freshly-squeezed lemon juice). Serve with dark green salad, walnuts, and crumbled blue cheese drizzled with olive oil and Balsamic vinegar    - Tuna Melt: Spread tuna salad onto 2 thick slices of tomato. Top with low-fat cheese and broil until cheese is melted. May also be made with chicken salad of shrimp salad. [place] with different types of cheeses.    - Homemade low-fat Chili using extra lean ground beef or ground turkey. Top with shredded cheese and salsa as desired. May add dollop fat-free sour cream if desired    - Dinner Omelet with shrimp or chicken and onion, green peppers and chives.    - No noodle lasagna: Use sliced zucchini or eggplant in place of noodles.   Layer with part skim ricotta cheese and low sugar meat sauce (use very lean ground beef or ground turkey).    - Mexican chicken bake: Bake chunks of chicken breast or thigh with taco seasoning, Pace brand enchilada sauce, green onions and low-fat cheese. Serve with ¼ cup black beans or fat free refried beans topped with chopped tomatoes or salsa.    - Lawrence frozen meatballs, simmered in Classico Marinara sauce. Different flavors of salsa or spaghetti sauce create different dishes! Sprinkle with parmesan cheese. Serve with grilled or steamed veggies, or a dark green salad.    - Simmer boneless skinless chicken thigh chunks in Classico Marinara sauce or roasted salsa until tender with chopped onion, bell pepper, garlic, mushrooms, spinach, etc.     - Hamburger, without the bun, dressed the way you like. Served with grilled or steamed veggies.    - Eggplant parmesan: Bake slices of eggplant at 350 degrees for 15 minutes. Layer tomato sauce, sliced eggplant and low-fat mozzarella cheese in a baking dish and cover with foil. Bake 30-40 more minutes or until bubbly. Uncover and bake at 400 degrees for about 15 more minutes, or until top is slightly crisp.    - Fish tacos: grilled/baked white fish, wrapped in James lettuce leaf, topped with salsa, shredded low-fat cheese, and light coleslaw.    Snacks: (100-200 calories; >5g protein)    - 1 low-fat cheese stick with 8 cherry tomatoes or 1 serving fresh fruit  - 4 thin slices fat-free turkey breast and 1 slice low-fat cheese  - 4 thin slices fat-free honey ham with wedge of melon  - 1/4 cup unsalted nuts with ½ cup fruit  - 6-oz container Dannon Light n Fit vanilla yogurt, topped with 1oz unsalted nuts         - apple, celery or baby carrots spread with 2 Tbsp natural peanut butter or almond butter   - apple slices with 1 oz slice low-fat cheese  - celery, cucumber, bell pepper or baby carrots dipped in ¼ cup hummus bean spread or light spinach and artichoke dip  (*recipe in lunch section)  - 100 calorie bag microwave light popcorn with 3 tbsp grated parmesan cheese  - Pankaj Links Beef Steak - 14g protein! (similar to beef jerky)  - 2 wedges Laughing Cow - Light Herb & Garlic Cheese with sliced cucumber or green bell pepper  - 1/2 cup low-fat cottage cheese with ¼ cup fruit or ¼ cup salsa  - RTD Protein drinks: Atkins, Low Carb Slim Fast, EAS light, Muscle Milk Light, etc.  - Homemade Protein drinks: GNC Soy95, Isopure, Nectar, UNJURY, Whey Gourmet, etc. Mix 1 scoop powder with 8oz skim/1% milk or light soymilk.  - Protein bars: Atkins, EAS, Pure Protein, Think Thin, Detour, etc. Must have 0-4 grams sugar - Read the label.    Takeout Options: No more than twice/week  Deli - Salads (no pasta or rice), meats, cheeses. Roasted chicken. Lox (salmon)    Mexican - Platters which don't include tortillas, chips, or rice. Go easy on the beans. Example: Fajitas without the tortillas. Ask the  not to bring chips to the table if they are too tempting.    Greek - Meat or fish and vegetable, but no bread or rice. Including hummus, baba ganoush, etc, is OK. Most sit-down Greek restaurants can provide you with cucumber slices for dipping instead of orville bread.    Fast Food (Avoid as much as possible) - Salads (no croutons and limit salad dressing to 2 tbsp), grilled chicken sandwich without the bun and ask for no alvarez. Caroles low fat chili or Taco Bell pintos and cheese.    BBQ - The meats are fine if you ask for sauces on the side, but most of the traditional side dishes are loaded with carbs. Oyu slaw, baked beans and BBQ sauce are typically made with sugar.    Chinese - Nothing deep-fried, no rice or noodles. Many Chinese sauces have starch and sugar in them, so you'll have to use your judgement. If you find that these sauces trigger cravings, or cause Dumping, you can ask for the sauce to be made without sugar or just use soy sauce.

## 2024-02-29 NOTE — ASSESSMENT & PLAN NOTE
Education provided on low calorie dietary changes. Encouraged increased daily exercise and PT. Will start daily Qsymia at 3.75-23mg daily. Increase dose to 7.5-46mg daily after two weeks. Pt instructed to stop Qsymia for IVF implantation 2/t associated teratogenic effects. Discussed adverse effects and addressed all questions/concerns. RTC 6 weeks.

## 2024-02-29 NOTE — PROGRESS NOTES
Subjective   Patient ID: .Ms Syed Lyon is a 38yoF  () presenting to Bariatric Surgery for concerns of persistent, elevated BMI.     Chief Complaint: Class II Obesity     Pt concerned about persistent, elevated BMI. Previously tried diet and exercise but has not been successful. Desk job as a . Previously tried going to the gym, , and dietary changes. Had to stop gym routine due to left shoulder injury. Will soon start PT on 3/04/24. Has previously tried phentermine but didn't feel like it was helpful, but rather increased appetite. Occasionally will purchase a chocolate bar or chocolate raisins while at the grocery store. Currently on PO contraceptive but has plans for IVF in the future.        New pt to me, referred by Self, Aaareferral  Patient Active Problem List   Diagnosis    Unspecified symptom associated with female genital organs    Uterine polyp    Hyperemesis affecting pregnancy, antepartum    Nausea & vomiting    Pregnancy resulting from assisted reproductive technology    Hyperemesis complicating pregnancy, antepartum    Hyperemesis gravidarum    Abdominal pain affecting pregnancy, antepartum    Microcytic anemia    Lower abdominal pain    Status post  delivery    Abdominal pain    Endometriosis determined by laparoscopy    Dysmenorrhea    Bronchitis    Anal fissure    Obesity, Class II, BMI 35-39.9, no comorbidity    Chronic pain of both knees    Chronic left shoulder pain         No HgBA1C on record  Lab Results       Component                Value               Date                       LDLCALC                  93.4                2022                 CREATININE               0.7                 2023               Wt Readings from Last 10 Encounters:   24 99.7 kg (219 lb 12.8 oz)   24 100 kg (220 lb 7.4 oz)   24 101.4 kg (223 lb 7 oz)   24 100 kg (220 lb 7.4 oz)   23 100.1 kg (220 lb 10.9 oz)    06/26/23 100.1 kg (220 lb 10.9 oz)   05/08/23 97.8 kg (215 lb 9.8 oz)   05/03/23 98.7 kg (217 lb 9.5 oz)   03/08/23 101.2 kg (223 lb)   03/01/23 101.4 kg (223 lb 8.7 oz)     Current weight: 99.7 kg (219 lb 12.8 oz) with BMI of 35.48    Current attempts at weight loss: multiple dietary/exercise modifications & trial of phentermine      Previous diet attempts:   -Keto diet but minimal weight loss     History of medication for loss:  checked today   Previously tried phentermine 37.5mg in 2021     Heaviest weight: 223lb     Lightest weight: 160lb in 2016 after daughters birth     Goal weight: 150-160lb     Last eye exam:  Jan 2024. No glaucoma                      Typical eating patterns:   breakfast: fresh fruit/vege smoothie made at home. no added sugars     lunch: 11am salmon & green beans     dinner:7:30pm salmon or chicken with a vegetable or salad. nothing after 8:30pm      snacks:grapes, cheese sticks, home made cookies (3-4/week)     beverages: tea/water (unsweetened). very rarely has soda      willingness to change: 10/10      Cardiac studies:   Last ECG on 3/09/2017 - NSR     BMR:1502     PBF: 47.4      Review of Systems   Constitutional:  Negative for chills and fever.   HENT:  Negative for congestion, hearing loss, sinus pain and sore throat.    Eyes:  Negative for blurred vision, photophobia and redness.   Respiratory:  Negative for cough, shortness of breath and wheezing.    Cardiovascular:  Negative for chest pain, palpitations and leg swelling.   Gastrointestinal:  Negative for blood in stool, constipation, diarrhea, nausea and vomiting.   Genitourinary:  Negative for dysuria and flank pain.   Musculoskeletal:  Positive for joint pain. Negative for falls and myalgias.   Skin:  Negative for itching and rash.   Neurological:  Negative for dizziness, tingling, weakness and headaches.   Psychiatric/Behavioral:  Negative for depression. The patient is not nervous/anxious.       Objective     Physical  Exam  Constitutional:       Appearance: She is obese.   HENT:      Head: Normocephalic and atraumatic.      Nose: No congestion.      Mouth/Throat:      Mouth: Mucous membranes are moist.      Pharynx: Oropharynx is clear. No oropharyngeal exudate.   Eyes:      Extraocular Movements: Extraocular movements intact.      Conjunctiva/sclera: Conjunctivae normal.   Neck:      Thyroid: No thyroid mass, thyromegaly or thyroid tenderness.   Cardiovascular:      Rate and Rhythm: Normal rate and regular rhythm.      Pulses: Normal pulses.      Heart sounds: Normal heart sounds. No murmur heard.  Pulmonary:      Effort: Pulmonary effort is normal. No respiratory distress.      Breath sounds: Normal breath sounds. No wheezing or rales.   Abdominal:      General: Bowel sounds are normal. There is no distension.      Palpations: Abdomen is soft.      Tenderness: There is no abdominal tenderness.   Musculoskeletal:         General: No swelling or tenderness.      Cervical back: Neck supple. No muscular tenderness.   Lymphadenopathy:      Cervical: No cervical adenopathy.   Skin:     General: Skin is warm.      Capillary Refill: Capillary refill takes less than 2 seconds.      Coloration: Skin is not jaundiced or pale.   Neurological:      General: No focal deficit present.      Mental Status: She is alert and oriented to person, place, and time.   Psychiatric:         Mood and Affect: Mood normal.         Behavior: Behavior normal.        Assessment and Plan   Ms Lyon is a 38yoF who presents to Bariatric Surgery for assistance in weight loss. No history of diabetes & currently on PO contraceptive. No success with prior changes to diet and exercise regimens. 10/10 willingness to make changes to improve weight. Oral medications preferred vs injectable. Recommend starting Qsymia. Education provided on dietary modifications. RTC in 6 weeks.     1. Obesity, Class II, BMI 35-39.9, no comorbidity  Assessment & Plan:  Education  provided on low calorie dietary changes. Encouraged increased daily exercise and PT. Will start daily Qsymia at 3.75-23mg daily. Increase dose to 7.5-46mg daily after two weeks. Pt instructed to stop Qsymia for IVF implantation 2/t associated teratogenic effects. Discussed adverse effects and addressed all questions/concerns. RTC 6 weeks.    Orders:  -     phentermine-topiramate (QSYMIA) 3.75-23 mg CM24; Take 1 capsule by mouth Daily. for 14 days  Dispense: 14 capsule; Refill: 0  -     phentermine-topiramate (QSYMIA) 7.5-46 mg CM24; Take 1 capsule by mouth once daily.  Dispense: 30 capsule; Refill: 3    2. Chronic pain of both knees    3. Chronic left shoulder pain    4. Microcytic anemia    RTC in 6weeks      Toan Remy MD  Internal Medicine, PGY-3  [facility]

## 2024-03-04 ENCOUNTER — CLINICAL SUPPORT (OUTPATIENT)
Dept: REHABILITATION | Facility: HOSPITAL | Age: 39
End: 2024-03-04
Payer: COMMERCIAL

## 2024-03-04 DIAGNOSIS — S43.432A SUPERIOR LABRUM ANTERIOR-TO-POSTERIOR (SLAP) TEAR OF LEFT SHOULDER: ICD-10-CM

## 2024-03-04 DIAGNOSIS — G89.29 CHRONIC LEFT SHOULDER PAIN: ICD-10-CM

## 2024-03-04 DIAGNOSIS — M25.512 CHRONIC LEFT SHOULDER PAIN: ICD-10-CM

## 2024-03-04 PROCEDURE — 97161 PT EVAL LOW COMPLEX 20 MIN: CPT

## 2024-03-04 PROCEDURE — 97110 THERAPEUTIC EXERCISES: CPT
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
## 2024-03-12 ENCOUNTER — CLINICAL SUPPORT (OUTPATIENT)
Dept: REHABILITATION | Facility: HOSPITAL | Age: 39
End: 2024-03-12
Payer: COMMERCIAL

## 2024-03-12 DIAGNOSIS — M25.512 CHRONIC LEFT SHOULDER PAIN: Primary | ICD-10-CM

## 2024-03-12 DIAGNOSIS — G89.29 CHRONIC LEFT SHOULDER PAIN: Primary | ICD-10-CM

## 2024-03-12 PROCEDURE — 97112 NEUROMUSCULAR REEDUCATION: CPT

## 2024-03-12 PROCEDURE — 97530 THERAPEUTIC ACTIVITIES: CPT

## 2024-03-12 NOTE — PROGRESS NOTES
[facility]   Physical Therapy Treatment Note      Name: Syed Lyon  Clinic Number: [id number]    Therapy Diagnosis: No diagnosis found.  Physician: Fatimah Moya PA-C    Visit Date: 3/12/2024    Physician Orders: PT Eval and Treat   Medical Diagnosis from Referral: Superior labrum anterior-to-posterior (SLAP) tear of left shoulderChronic left shoulder pain / Superior labrum anterior-to-posterior (SLAP) tear of left shoulder  Evaluation Date: 3/4/2024  Authorization Period Expiration: 02/26/2024 - 12/31/2024  Plan of Care Expiration: 7/4/24  Progress Note Due: 4/4/24  Visit # / Visits authorized: 1 / 20     FOTO: 63%  FOTO 2:   FOTO 3:  DC FOTO @: 72%    PTA Visit #: 0/5     Time In: 400 pm  Time Out: 455 pm  Total Billable Time: 55 minutes    Subjective     Pt reports: did her exercises - no pain. Unless she raises her arm above her head.  She was compliant with home exercise program.  Response to previous treatment: no change  Functional change: no change    Pain: 1/10  Location: L shoulder    Objective      Objective Measures updated at progress report unless specified.     Imaging:  XRAY (L shoulder): No acute or significant bony findings.  MRI (L shoulder):   ROTATOR CUFF: Supraspinatus, infraspinatus, subscapularis and teres minor tendons are intact.  Muscle bulk is preserved.     LABRUM: Contrast imbibition within the superior labrum that extends from anterior to posterior (2-9 o'clock). No paralabral cyst.  Remaining labral segments demonstrate normal morphology and signal intensity.     BICEPS: Thickening and increased signal intensity of the intra-articular biceps tendon.     BONES: No fractures.  No avascular necrosis.  No infiltrative process.     AC JOINT: Mild AC capsular thickening.  Flat morphology of the lateral acromion without undersurface spurring.     CARTILAGE: Intact without partial or full-thickness defects.     MISCELLANEOUS: Subacromial/subdeltoid bursa  "intact.  Superior, middle and inferior glenohumeral ligaments are normal.  Coracohumeral ligament is normal.  No axillary lymphadenopathy.     Impression:     1. Superior labral tear that extends from anterior to posterior and involves the biceps anchor.    Functional Shoulder ROM:     Right Left   ER overhead T4 C4   IR behind back T6 T12      Active Range of Motion:   Shoulder Right Left   Flexion 170 165   Abduction 180 155   ER at 0 85 70   ER at 90 95 55   IR 70 45       Treatment     Syed received the treatments listed below:      Syed participated in dynamic functional therapeutic activities to improve functional performance for 8  minutes, including:  OTD pulleys flexion/scaption 3 x 10    Syed participated in neuromuscular re-education activities to improve: Balance, Coordination, Kinesthetic, Sense, Proprioception and Posture for 47 minutes. The following activities were included:  L IR/ER OTB 3 x 10  Scap squeezes GTB 3 x 10  L IR/ER body blade 5 x 30"  L ball on wall circles CW/CCW 5 x 10  Prone Row 3 x 10   Prone scap set 3" 3 x 10  Prone scap set + ext 3" 3 x 10    Patient Education and Home Exercises       Education provided:   - continue HEP     Written Home Exercises Provided: Patient instructed to cont prior HEP. Exercises were reviewed and Syed was able to demonstrate them prior to the end of the session.  Syed demonstrated good  understanding of the education provided. See EMR under Patient Instructions for exercises provided during therapy sessions    Assessment     Pt tolerated today well. No increased symptoms. Added walkouts with band to her HEP    Syed Is progressing well towards her goals.   Pt prognosis is Good.     Pt will continue to benefit from skilled outpatient physical therapy to address the deficits listed in the problem list box on initial evaluation, provide pt/family education and to maximize pt's level of independence in the home and community environment.     Pt's " spiritual, cultural and educational needs considered and pt agreeable to plan of care and goals.     Anticipated barriers to physical therapy: none      Goals:  Short Term Goals:  4 weeks  1. Report decreased L shoulder pain < / =  8/10  to increase tolerance for ADLs  2. Increase PROM L shoulder WNL  3. Increased strength by 1/3 MMT grade in L shoulder to increase tolerance for ADL and work activities.  4. Pt to tolerate HEP to improve ROM and independence with ADL's     Long Term Goals: 8 weeks  1. Report decreased L shoulder pain  < / =  3 /10  to increase tolerance for ADLs  2. Increase AROM to WNL  3. Increase strength to >/= 4/5 in L shoulder/scapula to increase tolerance for ADL and work activities.  4. Pt goal: fasten bra pain-free  5. Pt will have improved gcode of CJ (20-40% limited) on FOTO shoulder in order to demonstrate true functional improvement.     Plan     Continue with PT ERNST Thompson, PT

## 2024-03-18 ENCOUNTER — PATIENT MESSAGE (OUTPATIENT)
Dept: BARIATRICS | Facility: CLINIC | Age: 39
End: 2024-03-18
Payer: COMMERCIAL

## 2024-03-25 ENCOUNTER — CLINICAL SUPPORT (OUTPATIENT)
Dept: REHABILITATION | Facility: HOSPITAL | Age: 39
End: 2024-03-25
Payer: COMMERCIAL

## 2024-03-25 ENCOUNTER — OFFICE VISIT (OUTPATIENT)
Dept: SPORTS MEDICINE | Facility: CLINIC | Age: 39
End: 2024-03-25
Payer: COMMERCIAL

## 2024-03-25 VITALS
BODY MASS INDEX: 35.91 KG/M2 | DIASTOLIC BLOOD PRESSURE: 70 MMHG | HEIGHT: 66 IN | WEIGHT: 223.44 LBS | SYSTOLIC BLOOD PRESSURE: 115 MMHG | HEART RATE: 80 BPM

## 2024-03-25 DIAGNOSIS — G89.29 CHRONIC LEFT SHOULDER PAIN: Primary | ICD-10-CM

## 2024-03-25 DIAGNOSIS — G89.29 CHRONIC LEFT SHOULDER PAIN: ICD-10-CM

## 2024-03-25 DIAGNOSIS — M25.512 CHRONIC LEFT SHOULDER PAIN: ICD-10-CM

## 2024-03-25 DIAGNOSIS — M25.512 CHRONIC LEFT SHOULDER PAIN: Primary | ICD-10-CM

## 2024-03-25 DIAGNOSIS — S43.432A SUPERIOR LABRUM ANTERIOR-TO-POSTERIOR (SLAP) TEAR OF LEFT SHOULDER: Primary | ICD-10-CM

## 2024-03-25 PROCEDURE — 99214 OFFICE O/P EST MOD 30 MIN: CPT | Mod: S$GLB,,, | Performed by: PHYSICIAN ASSISTANT

## 2024-03-25 PROCEDURE — 3078F DIAST BP <80 MM HG: CPT | Mod: CPTII,S$GLB,, | Performed by: PHYSICIAN ASSISTANT

## 2024-03-25 PROCEDURE — 99999 PR PBB SHADOW E&M-EST. PATIENT-LVL III: CPT | Mod: PBBFAC,,, | Performed by: PHYSICIAN ASSISTANT

## 2024-03-25 PROCEDURE — 3074F SYST BP LT 130 MM HG: CPT | Mod: CPTII,S$GLB,, | Performed by: PHYSICIAN ASSISTANT

## 2024-03-25 PROCEDURE — 1159F MED LIST DOCD IN RCRD: CPT | Mod: CPTII,S$GLB,, | Performed by: PHYSICIAN ASSISTANT

## 2024-03-25 PROCEDURE — 3008F BODY MASS INDEX DOCD: CPT | Mod: CPTII,S$GLB,, | Performed by: PHYSICIAN ASSISTANT

## 2024-03-25 PROCEDURE — 97530 THERAPEUTIC ACTIVITIES: CPT

## 2024-03-25 PROCEDURE — 97112 NEUROMUSCULAR REEDUCATION: CPT

## 2024-03-25 NOTE — PROGRESS NOTES
[facility]   Physical Therapy Treatment Note      Name: Syed Lyon  Clinic Number: [id number]    Therapy Diagnosis:   Encounter Diagnosis   Name Primary?    Chronic left shoulder pain Yes     Physician: Fatimah Moya PA-C    Visit Date: 3/25/2024    Physician Orders: PT Eval and Treat   Medical Diagnosis from Referral: Superior labrum anterior-to-posterior (SLAP) tear of left shoulderChronic left shoulder pain / Superior labrum anterior-to-posterior (SLAP) tear of left shoulder  Evaluation Date: 3/4/2024  Authorization Period Expiration: 02/26/2024 - 12/31/2024  Plan of Care Expiration: 7/4/24  Progress Note Due: 4/4/24  Visit # / Visits authorized: 2 / 20     FOTO: 63%  FOTO 2:   FOTO 3:  DC FOTO @: 72%    PTA Visit #: 0/5     Time In: 900 am  Time Out: 1000 am  Total Billable Time: 60 minutes    Subjective     Pt reports: just went to see PA who said she is to continue with PT, range was improved. Pt states she only has pain at night (7-8/10) or when she moves her arm in a certain direction during the day. Sore from her prior aptmt with PA but other than that no pain.    She was compliant with home exercise program.  Response to previous treatment: no change  Functional change: no change    Pain: 1/10  Location: L shoulder    Objective      Objective Measures updated at progress report unless specified.     Imaging:  XRAY (L shoulder): No acute or significant bony findings.  MRI (L shoulder):   ROTATOR CUFF: Supraspinatus, infraspinatus, subscapularis and teres minor tendons are intact.  Muscle bulk is preserved.     LABRUM: Contrast imbibition within the superior labrum that extends from anterior to posterior (2-9 o'clock). No paralabral cyst.  Remaining labral segments demonstrate normal morphology and signal intensity.     BICEPS: Thickening and increased signal intensity of the intra-articular biceps tendon.     BONES: No fractures.  No avascular necrosis.  No infiltrative  "process.     AC JOINT: Mild AC capsular thickening.  Flat morphology of the lateral acromion without undersurface spurring.     CARTILAGE: Intact without partial or full-thickness defects.     MISCELLANEOUS: Subacromial/subdeltoid bursa intact.  Superior, middle and inferior glenohumeral ligaments are normal.  Coracohumeral ligament is normal.  No axillary lymphadenopathy.     Impression:     1. Superior labral tear that extends from anterior to posterior and involves the biceps anchor.    Functional Shoulder ROM:     Right Left   ER overhead T4 C4   IR behind back T6 T12      Active Range of Motion:   Shoulder Right Left   Flexion 170 165   Abduction 180 155   ER at 0 85 70   ER at 90 95 55   IR 70 45       Treatment     Syed received the treatments listed below:      Syed participated in dynamic functional therapeutic activities to improve functional performance for 25  minutes, including:  OTD pulleys flexion/scaption 3 x 10  Table flexion walk backs 3 x 10  Wall slides 3 x 10    Syed participated in neuromuscular re-education activities to improve: Balance, Coordination, Kinesthetic, Sense, Proprioception and Posture for 35 minutes. The following activities were included:  L IR/ER OTB 3 x 10  Scap squeezes GTB 3 x 10  L IR/ER body blade 5 x 30"  L ball on wall circles CW/CCW 5 x 10  Prone Row 3 x 10   Prone scap set 3" 3 x 10  Prone scap set + ext 3" 3 x 10    Patient Education and Home Exercises       Education provided:   - continue HEP     Written Home Exercises Provided: Patient instructed to cont prior HEP. Exercises were reviewed and Syed was able to demonstrate them prior to the end of the session.  Syed demonstrated good  understanding of the education provided. See EMR under Patient Instructions for exercises provided during therapy sessions    Assessment     Pt tolerated today well. PA wants us to continue with PT. Pt still having pain at night that is pretty high level - pt did inform PA of this. " Will have to monitor that upon returning to PA    Syed Is progressing well towards her goals.   Pt prognosis is Good.     Pt will continue to benefit from skilled outpatient physical therapy to address the deficits listed in the problem list box on initial evaluation, provide pt/family education and to maximize pt's level of independence in the home and community environment.     Pt's spiritual, cultural and educational needs considered and pt agreeable to plan of care and goals.     Anticipated barriers to physical therapy: none      Goals:  Short Term Goals:  4 weeks  1. Report decreased L shoulder pain < / =  8/10  to increase tolerance for ADLs  2. Increase PROM L shoulder WNL  3. Increased strength by 1/3 MMT grade in L shoulder to increase tolerance for ADL and work activities.  4. Pt to tolerate HEP to improve ROM and independence with ADL's     Long Term Goals: 8 weeks  1. Report decreased L shoulder pain  < / =  3 /10  to increase tolerance for ADLs  2. Increase AROM to WNL  3. Increase strength to >/= 4/5 in L shoulder/scapula to increase tolerance for ADL and work activities.  4. Pt goal: fasten bra pain-free  5. Pt will have improved gcode of CJ (20-40% limited) on FOTO shoulder in order to demonstrate true functional improvement.     Plan     Continue with PT ERNST Thompson, PT
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
## 2024-04-01 ENCOUNTER — CLINICAL SUPPORT (OUTPATIENT)
Dept: REHABILITATION | Facility: HOSPITAL | Age: 39
End: 2024-04-01
Payer: COMMERCIAL

## 2024-04-01 DIAGNOSIS — G89.29 CHRONIC LEFT SHOULDER PAIN: Primary | ICD-10-CM

## 2024-04-01 DIAGNOSIS — M25.512 CHRONIC LEFT SHOULDER PAIN: Primary | ICD-10-CM

## 2024-04-01 PROCEDURE — 97530 THERAPEUTIC ACTIVITIES: CPT

## 2024-04-01 PROCEDURE — 97112 NEUROMUSCULAR REEDUCATION: CPT

## 2024-04-01 NOTE — PROGRESS NOTES
[facility]   Physical Therapy Treatment Note      Name: Syed Lyon  [facility] Number: [id number]    Therapy Diagnosis:   Encounter Diagnosis   Name Primary?    Chronic left shoulder pain Yes     Physician: Fatimah Moya PA-C    Visit Date: 4/1/2024    Physician Orders: PT Eval and Treat   Medical Diagnosis from Referral: Superior labrum anterior-to-posterior (SLAP) tear of left shoulderChronic left shoulder pain / Superior labrum anterior-to-posterior (SLAP) tear of left shoulder  Evaluation Date: 3/4/2024  Authorization Period Expiration: 02/26/2024 - 12/31/2024  Plan of Care Expiration: 7/4/24  Progress Note Due: 4/4/24  Visit # / Visits authorized: 3 / 20     FOTO: 63%  FOTO 2:   FOTO 3:  DC FOTO @: 72%    PTA Visit #: 0/5     Time In: 915 am  Time Out: 1005 am  Total Billable Time: 50 minutes    Subjective     Pt reports: still only having pain at night (7-8/10)     She was compliant with home exercise program.  Response to previous treatment: no change  Functional change: no change    Pain: 1/10  Location: L shoulder    Objective      Objective Measures updated at progress report unless specified.     Imaging:  XRAY (L shoulder): No acute or significant bony findings.  MRI (L shoulder):   ROTATOR CUFF: Supraspinatus, infraspinatus, subscapularis and teres minor tendons are intact.  Muscle bulk is preserved.     LABRUM: Contrast imbibition within the superior labrum that extends from anterior to posterior (2-9 o'clock). No paralabral cyst.  Remaining labral segments demonstrate normal morphology and signal intensity.     BICEPS: Thickening and increased signal intensity of the intra-articular biceps tendon.     BONES: No fractures.  No avascular necrosis.  No infiltrative process.     AC JOINT: Mild AC capsular thickening.  Flat morphology of the lateral acromion without undersurface spurring.     CARTILAGE: Intact without partial or full-thickness defects.     MISCELLANEOUS:  "Subacromial/subdeltoid bursa intact.  Superior, middle and inferior glenohumeral ligaments are normal.  Coracohumeral ligament is normal.  No axillary lymphadenopathy.     Impression:     1. Superior labral tear that extends from anterior to posterior and involves the biceps anchor.    Functional Shoulder ROM:     Right Left   ER overhead T4 C4   IR behind back T6 T12      Active Range of Motion:   Shoulder Right Left   Flexion 170 165   Abduction 180 155   ER at 0 85 70   ER at 90 95 55   IR 70 45       Treatment     Syed received the treatments listed below:      Syed participated in dynamic functional therapeutic activities to improve functional performance for 10 minutes, including:  Table flexion walk backs 3 x 10  Wall slides 3 x 10    Syed participated in neuromuscular re-education activities to improve: Balance, Coordination, Kinesthetic, Sense, Proprioception and Posture for 40 minutes. The following activities were included:  L IR/ER OTB 3 x 10  Scap squeezes GTB 3 x 10  L IR/ER body blade 5 x 30"  L ball on wall circles CW/CCW 5 x 10  Prone Row 3 x 10   Prone scap set 3" 3 x 10  Prone scap set + ext 3" 3 x 10  ER 3# 3 x 10  Horizontal Abd 2#  3 x10  Mod Deltoid 2# 3 x 10    Patient Education and Home Exercises       Education provided:   - continue HEP     Written Home Exercises Provided: Patient instructed to cont prior HEP. Exercises were reviewed and Syed was able to demonstrate them prior to the end of the session.  Syed demonstrated good  understanding of the education provided. See EMR under Patient Instructions for exercises provided during therapy sessions    Assessment     Pt still only has pain at night. She does have improved symptoms and Range of Motion. Continue with PT    Syed Is progressing well towards her goals.   Pt prognosis is Good.     Pt will continue to benefit from skilled outpatient physical therapy to address the deficits listed in the problem list box on initial " evaluation, provide pt/family education and to maximize pt's level of independence in the home and community environment.     Pt's spiritual, cultural and educational needs considered and pt agreeable to plan of care and goals.     Anticipated barriers to physical therapy: none      Goals:  Short Term Goals:  4 weeks  1. Report decreased L shoulder pain < / =  8/10  to increase tolerance for ADLs  2. Increase PROM L shoulder WNL  3. Increased strength by 1/3 MMT grade in L shoulder to increase tolerance for ADL and work activities.  4. Pt to tolerate HEP to improve ROM and independence with ADL's     Long Term Goals: 8 weeks  1. Report decreased L shoulder pain  < / =  3 /10  to increase tolerance for ADLs  2. Increase AROM to WNL  3. Increase strength to >/= 4/5 in L shoulder/scapula to increase tolerance for ADL and work activities.  4. Pt goal: fasten bra pain-free  5. Pt will have improved gcode of CJ (20-40% limited) on FOTO shoulder in order to demonstrate true functional improvement.     Plan     Continue with PT ERNST Thompson, PT

## 2024-04-12 ENCOUNTER — CLINICAL SUPPORT (OUTPATIENT)
Dept: REHABILITATION | Facility: HOSPITAL | Age: 39
End: 2024-04-12
Payer: COMMERCIAL

## 2024-04-12 DIAGNOSIS — G89.29 CHRONIC LEFT SHOULDER PAIN: Primary | ICD-10-CM

## 2024-04-12 DIAGNOSIS — M25.512 CHRONIC LEFT SHOULDER PAIN: Primary | ICD-10-CM

## 2024-04-12 PROCEDURE — 97112 NEUROMUSCULAR REEDUCATION: CPT

## 2024-04-12 PROCEDURE — 97530 THERAPEUTIC ACTIVITIES: CPT

## 2024-04-12 NOTE — PROGRESS NOTES
[facility]   Physical Therapy Treatment Note      Name: Syed Lyon  Clinic Number: [id number]    Therapy Diagnosis:   No diagnosis found.    Physician: Fatimah Moya PA-C    Visit Date: 4/12/2024    Physician Orders: PT Eval and Treat   Medical Diagnosis from Referral: Superior labrum anterior-to-posterior (SLAP) tear of left shoulderChronic left shoulder pain / Superior labrum anterior-to-posterior (SLAP) tear of left shoulder  Evaluation Date: 3/4/2024  Authorization Period Expiration: 02/26/2024 - 12/31/2024  Plan of Care Expiration: 7/4/24  Progress Note Due: 4/4/24  Visit # / Visits authorized: 4 / 20     FOTO: 63%  FOTO 2:   FOTO 3:  DC FOTO @: 72%    PTA Visit #: 0/5     Time In: 805 am  Time Out: 900 am  Total Billable Time: 55 minutes    Subjective     Pt reports: no adverse symptoms from last time. No pain today no loner has pain at night just fatigue with exercises    She was compliant with home exercise program.  Response to previous treatment: improved pain  Functional change: no change    Pain: 1/10  Location: L shoulder    Objective      Objective Measures updated at progress report unless specified.     Imaging:  XRAY (L shoulder): No acute or significant bony findings.  MRI (L shoulder):   ROTATOR CUFF: Supraspinatus, infraspinatus, subscapularis and teres minor tendons are intact.  Muscle bulk is preserved.     LABRUM: Contrast imbibition within the superior labrum that extends from anterior to posterior (2-9 o'clock). No paralabral cyst.  Remaining labral segments demonstrate normal morphology and signal intensity.     BICEPS: Thickening and increased signal intensity of the intra-articular biceps tendon.     BONES: No fractures.  No avascular necrosis.  No infiltrative process.     AC JOINT: Mild AC capsular thickening.  Flat morphology of the lateral acromion without undersurface spurring.     CARTILAGE: Intact without partial or full-thickness defects.    "  MISCELLANEOUS: Subacromial/subdeltoid bursa intact.  Superior, middle and inferior glenohumeral ligaments are normal.  Coracohumeral ligament is normal.  No axillary lymphadenopathy.     Impression:     1. Superior labral tear that extends from anterior to posterior and involves the biceps anchor.    Functional Shoulder ROM:     Right Left   ER overhead T4 C4   IR behind back T6 T12      Active Range of Motion:   Shoulder Right Left   Flexion 170 165   Abduction 180 155   ER at 0 85 70   ER at 90 95 55   IR 70 45       Treatment     Syed received the treatments listed below:      Syed participated in dynamic functional therapeutic activities to improve functional performance for 10 minutes, including:  Table flexion walk backs 3 x 10  Wall slides 3 x 10    Syed participated in neuromuscular re-education activities to improve: Balance, Coordination, Kinesthetic, Sense, Proprioception and Posture for 45 minutes. The following activities were included:  L IR/ER OTB 3 x 10  Scap squeezes GTB 3 x 10  L IR/ER body blade 5 x 30"  L ball on wall circles CW/CCW 5 x 10  Prone:  Row 3 x 10   scap set 3" 3 x 10  scap set + ext 3" 3 x 10  Sidelying:  ER 3# 3 x 10  Horizontal Abd 2#  3 x10  Mod Deltoid 2# 3 x 10    Patient Education and Home Exercises       Education provided:   - continue HEP     Written Home Exercises Provided: Patient instructed to cont prior HEP. Exercises were reviewed and Syed was able to demonstrate them prior to the end of the session.  Syed demonstrated good  understanding of the education provided. See EMR under Patient Instructions for exercises provided during therapy sessions    Assessment     Pt no loner having pain at night and not having to take ibuprofen anymore, both signs of improvement. She does have improved symptoms and Range of Motion. Continue with PT    Syed Is progressing well towards her goals.   Pt prognosis is Good.     Pt will continue to benefit from skilled outpatient " physical therapy to address the deficits listed in the problem list box on initial evaluation, provide pt/family education and to maximize pt's level of independence in the home and community environment.     Pt's spiritual, cultural and educational needs considered and pt agreeable to plan of care and goals.     Anticipated barriers to physical therapy: none      Goals:  Short Term Goals:  4 weeks  1. Report decreased L shoulder pain < / =  8/10  to increase tolerance for ADLs  2. Increase PROM L shoulder WNL  3. Increased strength by 1/3 MMT grade in L shoulder to increase tolerance for ADL and work activities.  4. Pt to tolerate HEP to improve ROM and independence with ADL's     Long Term Goals: 8 weeks  1. Report decreased L shoulder pain  < / =  3 /10  to increase tolerance for ADLs  2. Increase AROM to WNL  3. Increase strength to >/= 4/5 in L shoulder/scapula to increase tolerance for ADL and work activities.  4. Pt goal: fasten bra pain-free  5. Pt will have improved gcode of CJ (20-40% limited) on FOTO shoulder in order to demonstrate true functional improvement.     Plan     Continue with PT ERNST Thompson, PT

## 2024-04-18 ENCOUNTER — CLINICAL SUPPORT (OUTPATIENT)
Dept: REHABILITATION | Facility: HOSPITAL | Age: 39
End: 2024-04-18
Payer: COMMERCIAL

## 2024-04-18 DIAGNOSIS — M25.512 CHRONIC LEFT SHOULDER PAIN: Primary | ICD-10-CM

## 2024-04-18 DIAGNOSIS — G89.29 CHRONIC LEFT SHOULDER PAIN: Primary | ICD-10-CM

## 2024-04-18 PROCEDURE — 97112 NEUROMUSCULAR REEDUCATION: CPT

## 2024-04-18 PROCEDURE — 97110 THERAPEUTIC EXERCISES: CPT

## 2024-04-18 NOTE — PROGRESS NOTES
[facility]   Physical Therapy Treatment Note      Name: Syed Lyon  [facility] Number: [id number]    Therapy Diagnosis:   Encounter Diagnosis   Name Primary?    Chronic left shoulder pain Yes     Physician: Fatimah Moya PA-C    Visit Date: 4/18/2024    Physician Orders: PT Eval and Treat   Medical Diagnosis from Referral: Superior labrum anterior-to-posterior (SLAP) tear of left shoulderChronic left shoulder pain / Superior labrum anterior-to-posterior (SLAP) tear of left shoulder  Evaluation Date: 3/4/2024  Authorization Period Expiration: 02/26/2024 - 12/31/2024  Plan of Care Expiration: 7/4/24  Progress Note Due: 4/4/24  Visit # / Visits authorized: 5 / 20     FOTO: 63%  FOTO 2:   FOTO 3:  DC FOTO @: 72%    PTA Visit #: 0/5     Time In: 1012 am  Time Out: 1100 am  Total Billable Time: 48 minutes    Subjective     Pt reports: hurt a little at night after last time.     She was compliant with home exercise program.  Response to previous treatment: improved pain  Functional change: no change    Pain: 1/10  Location: L shoulder    Objective      Objective Measures updated at progress report unless specified.     Imaging:  XRAY (L shoulder): No acute or significant bony findings.  MRI (L shoulder):   ROTATOR CUFF: Supraspinatus, infraspinatus, subscapularis and teres minor tendons are intact.  Muscle bulk is preserved.     LABRUM: Contrast imbibition within the superior labrum that extends from anterior to posterior (2-9 o'clock). No paralabral cyst.  Remaining labral segments demonstrate normal morphology and signal intensity.     BICEPS: Thickening and increased signal intensity of the intra-articular biceps tendon.     BONES: No fractures.  No avascular necrosis.  No infiltrative process.     AC JOINT: Mild AC capsular thickening.  Flat morphology of the lateral acromion without undersurface spurring.     CARTILAGE: Intact without partial or full-thickness defects.    "  MISCELLANEOUS: Subacromial/subdeltoid bursa intact.  Superior, middle and inferior glenohumeral ligaments are normal.  Coracohumeral ligament is normal.  No axillary lymphadenopathy.     Impression:     1. Superior labral tear that extends from anterior to posterior and involves the biceps anchor.    Functional Shoulder ROM:     Right Left   ER overhead T4 C4   IR behind back T6 T12      Active Range of Motion:   Shoulder Right Left   Flexion 170 165   Abduction 180 155   ER at 0 85 70   ER at 90 95 55   IR 70 45       Treatment     Syed received the treatments listed below:      Syed participated in neuromuscular re-education activities to improve: Balance, Coordination, Kinesthetic, Sense, Proprioception and Posture for 25 minutes. The following activities were included:  L IR/ER GTB 3 x 10  Scap squeezes GTB 3 x 10  L IR/ER body blade 5 x 30"  L ball on wall circles CW/CCW 5 x 10  Prone:  Row 3 x 10   scap set 3" 3 x 10  scap set + ext 3" 3 x 10    Syed received therapeutic exercises to develop strength, endurance, ROM, flexibility, posture and core stabilization for 23 minutes including:     Sidelying:  ER 3# 3 x 10  Horizontal Abd 2#  3 x10  Mod Deltoid 2# 3 x 10    Patient Education and Home Exercises       Education provided:   - continue HEP     Written Home Exercises Provided: Patient instructed to cont prior HEP. Exercises were reviewed and Syed was able to demonstrate them prior to the end of the session.  Syed demonstrated good  understanding of the education provided. See EMR under Patient Instructions for exercises provided during therapy sessions    Assessment     Pt did have pain at night after last session. Feels fine today. Still pain-free with exercises.    Syed Is progressing well towards her goals.   Pt prognosis is Good.     Pt will continue to benefit from skilled outpatient physical therapy to address the deficits listed in the problem list box on initial evaluation, provide pt/family " education and to maximize pt's level of independence in the home and community environment.     Pt's spiritual, cultural and educational needs considered and pt agreeable to plan of care and goals.     Anticipated barriers to physical therapy: none      Goals:  Short Term Goals:  4 weeks  1. Report decreased L shoulder pain < / =  8/10  to increase tolerance for ADLs  2. Increase PROM L shoulder WNL  3. Increased strength by 1/3 MMT grade in L shoulder to increase tolerance for ADL and work activities.  4. Pt to tolerate HEP to improve ROM and independence with ADL's     Long Term Goals: 8 weeks  1. Report decreased L shoulder pain  < / =  3 /10  to increase tolerance for ADLs  2. Increase AROM to WNL  3. Increase strength to >/= 4/5 in L shoulder/scapula to increase tolerance for ADL and work activities.  4. Pt goal: fasten bra pain-free  5. Pt will have improved gcode of CJ (20-40% limited) on FOTO shoulder in order to demonstrate true functional improvement.     Plan     Continue with PT ERNST Thompson, PT

## 2024-04-23 ENCOUNTER — CLINICAL SUPPORT (OUTPATIENT)
Dept: REHABILITATION | Facility: HOSPITAL | Age: 39
End: 2024-04-23
Payer: COMMERCIAL

## 2024-04-23 DIAGNOSIS — M25.512 CHRONIC LEFT SHOULDER PAIN: Primary | ICD-10-CM

## 2024-04-23 DIAGNOSIS — G89.29 CHRONIC LEFT SHOULDER PAIN: Primary | ICD-10-CM

## 2024-04-23 PROCEDURE — 97112 NEUROMUSCULAR REEDUCATION: CPT

## 2024-04-23 PROCEDURE — 97110 THERAPEUTIC EXERCISES: CPT

## 2024-04-23 NOTE — PROGRESS NOTES
[facility]   Physical Therapy Treatment Note      Name: Syed Lyon  [facility] Number: [id number]    Therapy Diagnosis:   Encounter Diagnosis   Name Primary?    Chronic left shoulder pain Yes     Physician: Fatimah Moya PA-C    Visit Date: 4/23/2024    Physician Orders: PT Eval and Treat   Medical Diagnosis from Referral: Superior labrum anterior-to-posterior (SLAP) tear of left shoulderChronic left shoulder pain / Superior labrum anterior-to-posterior (SLAP) tear of left shoulder  Evaluation Date: 3/4/2024  Authorization Period Expiration: 02/26/2024 - 12/31/2024  Plan of Care Expiration: 7/4/24  Progress Note Due: 4/4/24  Visit # / Visits authorized: 6 / 20     FOTO: 63%  FOTO 2: 84%  FOTO 3:  DC FOTO @: 72%    PTA Visit #: 0/5     Time In: 302 pm  Time Out: 400 pm   Total Billable Time: 58 minutes    Subjective     Pt reports: hurt a little bit. Does have pain still but related to certain movements. Not currently in pain right now. Light pulling in shoulder     She was compliant with home exercise program.  Response to previous treatment: improved pain  Functional change: no change    Pain: 1/10  Location: L shoulder    Objective      Objective Measures updated at progress report unless specified.     Imaging:  XRAY (L shoulder): No acute or significant bony findings.  MRI (L shoulder):   ROTATOR CUFF: Supraspinatus, infraspinatus, subscapularis and teres minor tendons are intact.  Muscle bulk is preserved.     LABRUM: Contrast imbibition within the superior labrum that extends from anterior to posterior (2-9 o'clock). No paralabral cyst.  Remaining labral segments demonstrate normal morphology and signal intensity.     BICEPS: Thickening and increased signal intensity of the intra-articular biceps tendon.     BONES: No fractures.  No avascular necrosis.  No infiltrative process.     AC JOINT: Mild AC capsular thickening.  Flat morphology of the lateral acromion without  "undersurface spurring.     CARTILAGE: Intact without partial or full-thickness defects.     MISCELLANEOUS: Subacromial/subdeltoid bursa intact.  Superior, middle and inferior glenohumeral ligaments are normal.  Coracohumeral ligament is normal.  No axillary lymphadenopathy.     Impression:     1. Superior labral tear that extends from anterior to posterior and involves the biceps anchor.    Functional Shoulder ROM:     Right Left   ER overhead T4 C4   IR behind back T6 T12      Active Range of Motion:   Shoulder Right Left   Flexion 170 165   Abduction 180 155   ER at 0 85 70   ER at 90 95 55   IR 70 45       Treatment     Syed received the treatments listed below:      Syed participated in neuromuscular re-education activities to improve: Balance, Coordination, Kinesthetic, Sense, Proprioception and Posture for 35 minutes. The following activities were included:  L IR/ER GTB 3 x 10  L ER and punch out GTB 3 x 10  Serratus punch GTB 3 x 10  L IR/ER body blade 5 x 30"  L ball on wall circles CW/CCW 5 x 10  Prone:  Row 3 x 10   scap set 3" 3 x 10  scap set + ext 3" 3 x 10      Syed received therapeutic exercises to develop strength, endurance, ROM, flexibility, posture and core stabilization for 23 minutes including:     Sidelying:  ER 3# 3 x 10  Horizontal Abd 2#  3 x10  Mod Deltoid 2# 3 x 10    Patient Education and Home Exercises       Education provided:   - continue HEP     Written Home Exercises Provided: Patient instructed to cont prior HEP. Exercises were reviewed and Syed was able to demonstrate them prior to the end of the session.  Syed demonstrated good  understanding of the education provided. See EMR under Patient Instructions for exercises provided during therapy sessions    Assessment     Pt presents with c/o increased soreness but not at the moment. She is going to keep pain diary because cannot recall when the pain occurs / what causes it.    Syed Is progressing well towards her goals.   Pt " prognosis is Good.     Pt will continue to benefit from skilled outpatient physical therapy to address the deficits listed in the problem list box on initial evaluation, provide pt/family education and to maximize pt's level of independence in the home and community environment.     Pt's spiritual, cultural and educational needs considered and pt agreeable to plan of care and goals.     Anticipated barriers to physical therapy: none      Goals:  Short Term Goals:  4 weeks  1. Report decreased L shoulder pain < / =  8/10  to increase tolerance for ADLs  2. Increase PROM L shoulder WNL  3. Increased strength by 1/3 MMT grade in L shoulder to increase tolerance for ADL and work activities.  4. Pt to tolerate HEP to improve ROM and independence with ADL's     Long Term Goals: 8 weeks  1. Report decreased L shoulder pain  < / =  3 /10  to increase tolerance for ADLs  2. Increase AROM to WNL  3. Increase strength to >/= 4/5 in L shoulder/scapula to increase tolerance for ADL and work activities.  4. Pt goal: fasten bra pain-free  5. Pt will have improved gcode of CJ (20-40% limited) on FOTO shoulder in order to demonstrate true functional improvement.     Plan     Continue with PT ERNST Thompson, PT

## 2024-04-30 ENCOUNTER — CLINICAL SUPPORT (OUTPATIENT)
Dept: REHABILITATION | Facility: HOSPITAL | Age: 39
End: 2024-04-30
Payer: COMMERCIAL

## 2024-04-30 DIAGNOSIS — G89.29 CHRONIC LEFT SHOULDER PAIN: Primary | ICD-10-CM

## 2024-04-30 DIAGNOSIS — M25.512 CHRONIC LEFT SHOULDER PAIN: Primary | ICD-10-CM

## 2024-04-30 PROCEDURE — 97112 NEUROMUSCULAR REEDUCATION: CPT

## 2024-04-30 PROCEDURE — 97110 THERAPEUTIC EXERCISES: CPT

## 2024-04-30 NOTE — PROGRESS NOTES
[facility]   Physical Therapy Treatment Note      Name: Syed Lyon  [facility] Number: [id number]    Therapy Diagnosis:   Encounter Diagnosis   Name Primary?    Chronic left shoulder pain Yes     Physician: Fatimah Moya PA-C    Visit Date: 4/30/2024    Physician Orders: PT Eval and Treat   Medical Diagnosis from Referral: Superior labrum anterior-to-posterior (SLAP) tear of left shoulderChronic left shoulder pain / Superior labrum anterior-to-posterior (SLAP) tear of left shoulder  Evaluation Date: 3/4/2024  Authorization Period Expiration: 02/26/2024 - 12/31/2024  Plan of Care Expiration: 7/4/24  Progress Note Due: 4/4/24  Visit # / Visits authorized: 7 / 20     FOTO: 63%  FOTO 2: 84%  FOTO 3:  DC FOTO @: 72%    PTA Visit #: 0/5     Time In: 300 pm  Time Out: 358 pm   Total Billable Time: 58 minutes    Subjective     Pt reports: a little pain in L shoulder with certain movements.Did not have a good weekend with pain. Patient reports not having shoulder pain wake her throughout the night anymore. Pt feels she is getting better with PT and is wanting to continue.     She was compliant with home exercise program.  Response to previous treatment: improved pain  Functional change: no change    Pain: 1/10  Location: L shoulder    Objective      Objective Measures updated at progress report unless specified.     Imaging:  XRAY (L shoulder): No acute or significant bony findings.  MRI (L shoulder):   ROTATOR CUFF: Supraspinatus, infraspinatus, subscapularis and teres minor tendons are intact.  Muscle bulk is preserved.     LABRUM: Contrast imbibition within the superior labrum that extends from anterior to posterior (2-9 o'clock). No paralabral cyst.  Remaining labral segments demonstrate normal morphology and signal intensity.     BICEPS: Thickening and increased signal intensity of the intra-articular biceps tendon.     BONES: No fractures.  No avascular necrosis.  No infiltrative  "process.     AC JOINT: Mild AC capsular thickening.  Flat morphology of the lateral acromion without undersurface spurring.     CARTILAGE: Intact without partial or full-thickness defects.     MISCELLANEOUS: Subacromial/subdeltoid bursa intact.  Superior, middle and inferior glenohumeral ligaments are normal.  Coracohumeral ligament is normal.  No axillary lymphadenopathy.     Impression:     1. Superior labral tear that extends from anterior to posterior and involves the biceps anchor.    Functional Shoulder ROM:     Right Left   ER overhead T4 C4   IR behind back T6 T12      Active Range of Motion:   Shoulder Right Left   Flexion 170 165   Abduction 180 155   ER at 0 85 70   ER at 90 95 55   IR 70 45       Treatment     Syed received the treatments listed below:      Syed participated in neuromuscular re-education activities to improve: Balance, Coordination, Kinesthetic, Sense, Proprioception and Posture for 40 minutes. The following activities were included:  L IR/ER GTB 3 x 10  L ER and punch out GTB 3 x 10  Serratus punch 4# 3 x 10  L IR/ER body blade 5 x 30"  L ball on wall circles yellow med ball CW/CCW 5 x 10  Prone:  Row 4# 3 x 10   scap set + ext 3" 3 x 10  T 3 x 10    Syed received therapeutic exercises to develop strength, endurance, ROM, flexibility, posture and core stabilization for 18 minutes including:     Sidelying:  ER 3# 3 x 10  Horizontal Abd 2#  3 x10  Mod Deltoid 2# 3 x 10    Patient Education and Home Exercises       Education provided:   - continue HEP     Written Home Exercises Provided: Patient instructed to cont prior HEP. Exercises were reviewed and Syed was able to demonstrate them prior to the end of the session.  Syed demonstrated good  understanding of the education provided. See EMR under Patient Instructions for exercises provided during therapy sessions    Assessment     Pt continues to progress. Updated her HEP.     Syed Is progressing well towards her goals.   Pt " prognosis is Good.     Pt will continue to benefit from skilled outpatient physical therapy to address the deficits listed in the problem list box on initial evaluation, provide pt/family education and to maximize pt's level of independence in the home and community environment.     Pt's spiritual, cultural and educational needs considered and pt agreeable to plan of care and goals.     Anticipated barriers to physical therapy: none      Goals:  Short Term Goals:  4 weeks - MET   1. Report decreased L shoulder pain < / =  8/10  to increase tolerance for ADLs  2. Increase PROM L shoulder WNL  3. Increased strength by 1/3 MMT grade in L shoulder to increase tolerance for ADL and work activities.  4. Pt to tolerate HEP to improve ROM and independence with ADL's     Long Term Goals: 8 weeks  1. Report decreased L shoulder pain  < / =  3 /10  to increase tolerance for ADLs  2. Increase AROM to WNL  3. Increase strength to >/= 4/5 in L shoulder/scapula to increase tolerance for ADL and work activities.  4. Pt goal: fasten bra pain-free  5. Pt will have improved gcode of CJ (20-40% limited) on FOTO shoulder in order to demonstrate true functional improvement.     Plan     Continue with PT ERNST Thompson, PT

## 2024-05-01 ENCOUNTER — OFFICE VISIT (OUTPATIENT)
Dept: BARIATRICS | Facility: CLINIC | Age: 39
End: 2024-05-01
Payer: COMMERCIAL

## 2024-05-01 VITALS
OXYGEN SATURATION: 100 % | WEIGHT: 214 LBS | HEART RATE: 80 BPM | SYSTOLIC BLOOD PRESSURE: 114 MMHG | DIASTOLIC BLOOD PRESSURE: 65 MMHG | BODY MASS INDEX: 34.54 KG/M2

## 2024-05-01 DIAGNOSIS — E66.9 OBESITY, CLASS I, BMI 30.0-34.9 (SEE ACTUAL BMI): Primary | ICD-10-CM

## 2024-05-01 PROCEDURE — 99999 PR PBB SHADOW E&M-EST. PATIENT-LVL V: CPT | Mod: PBBFAC,,, | Performed by: INTERNAL MEDICINE

## 2024-05-01 PROCEDURE — 99213 OFFICE O/P EST LOW 20 MIN: CPT | Mod: S$GLB,,, | Performed by: INTERNAL MEDICINE

## 2024-05-01 PROCEDURE — 1160F RVW MEDS BY RX/DR IN RCRD: CPT | Mod: CPTII,S$GLB,, | Performed by: INTERNAL MEDICINE

## 2024-05-01 PROCEDURE — 1159F MED LIST DOCD IN RCRD: CPT | Mod: CPTII,S$GLB,, | Performed by: INTERNAL MEDICINE

## 2024-05-01 PROCEDURE — 3078F DIAST BP <80 MM HG: CPT | Mod: CPTII,S$GLB,, | Performed by: INTERNAL MEDICINE

## 2024-05-01 PROCEDURE — 3074F SYST BP LT 130 MM HG: CPT | Mod: CPTII,S$GLB,, | Performed by: INTERNAL MEDICINE

## 2024-05-01 PROCEDURE — 3008F BODY MASS INDEX DOCD: CPT | Mod: CPTII,S$GLB,, | Performed by: INTERNAL MEDICINE

## 2024-05-01 RX ORDER — PHENTERMINE AND TOPIRAMATE 11.25; 69 MG/1; MG/1
1 CAPSULE, EXTENDED RELEASE ORAL DAILY
Qty: 30 CAPSULE | Refills: 4 | Status: SHIPPED | OUTPATIENT
Start: 2024-05-01

## 2024-05-01 NOTE — PATIENT INSTRUCTIONS
"  Patient warned of common side effects of phentermine/topiraimate including anxiety, insomnia, palpitations and increased blood pressure.  She was informed of the potential side effects such as serious and possibly fatal rash in which case the medication should be discontinued immediately. Paresthesias, forgetfulness, fatigue, kidney stones, GI symptoms, and changes in lab values such as electrolytes, blood counts and kidney function.that stopping the medication without making lifestyle changes will result in regain of weight.  Patient states understanding.    Weight loss medications are controlled substances.  They require routine follow up. Prescription or pills that are lost or destroyed will not be replaced.             Increase low impact activity as cleared by PT.  Avoid high impact activity, very heavy lifting or other exercise regimens that may cause discomfort.     Add some type of resistance training 2-3 days a week. These can be body weight exercises, light weight or elastic bands. [employer] and [employer] are great sources for free work out plans and videos.       1000 omar pb meal planner, meal ideas given,        Spring Recipes:    [place] Shake:     Ingredients  ½ cup low fat cottage cheese  ¼ cup vanilla protein powder  1/8 tsp mint extract  2-3 packets of stevia or artificial sweetener of choice  5-10 ice cubes (more or less depending on how thick you would like it)  4 oz of water  2-3 drops of green food coloring OR a small handful of spinach to make it green    Put all ingredients in  and  until desired consistency.      "Unstuffed" Cabbage Rolls:    Ingredients:  1 1/2 to 2 pounds lean ground beef or turkey  1 large onion, chopped  1 clove garlic, minced  1 small cabbage, chopped  2 cans (14.5 ounces each) diced tomatoes  1 can (8 ounces) tomato sauce  ¼ - ½ cup water depending on desired consistency  1 teaspoon ground black pepper, salt to taste    Spray large skillet with nonstick " cookspray. Heat pan on medium heat. Sauté the onions until tender, and then add the ground beef (or turkey) and cook until the meat is browned.    Add the garlic, cook an additional minute before adding the remaining ingredients. Cover, reduce the heat and simmer about 25 minutes (or until the cabbage is  fork tender)        Not so Noguera's Pie:    Ingredients  2 (or more) pounds of lean ground beef, or turkey  2 heads of cauliflower or 3 bags of frozen cauliflower, chopped  1 bag of frozen mixed veggies          (NO Corn, Peas or Potatoes!)  1-2 onions  1 egg  1 teaspoon each of basil, garlic powder, pepper, oregano and a little cayenne  4-5 sprays of I cant believe its not butter spray  4 ounces of fat free cream cheese (optional)  Low fat Cheese to top (optional)    Roast cauliflower in oven on 350* F for about 15-20 minutes, until browned and fork tender. (begin weinberg meat while cauliflower is cooking). Place cooked cauliflower in . Add 4 ounces of fat free cream cheese, 4-5 sprays of I cant believe its not butter SPRAY, and spices and puree until creamy.     While cauliflower is roasting, brown meat in large skillet and season to taste. Set aside. Sauté diced onion in skillet until somewhat soft. Set aside with meat. Pour mixed veggies in the skillet to heat on low heat.     Mix the meat, onions, mixed veggies, raw egg and any additional seasonings and put in bottom of 9x13 baking dish. Spread mashed cauliflower mixture over it until smooth.    Bake at 350 for approximately 30 minutes. Add cheese and bake 5 additional minutes (optional). Serve warm (or reheat later).    Crock Pot Balsamic Pork Tenderloin:    Ingredients:  1 tsp dried thyme  1 tsp ground pepper  ½ tsp salt  3 tbsp dried minced onion  2 tsp dried basil  ¼ cup low sodium broth  ½ cup balsamic vinegar  2 cloves garlic, minced  2 lbs Pork Tenderloin    Mix first 5 ingredients together. Rub pork tenderloin with dry seasoning mixture.   In a large sauté pan, heat ¼ cup balsamic vinegar and garlic on medium heat. Add pork to sauté harris and sear, weinberg all sides. Add low sodium broth, 1 tbsp at a time to keep tenderloin from sticking or use nonstick cookspray.     Transfer meat to crock pot. Pour remaining balsamic vinegar into sauté pan and continue  to stir for a few minutes to deglaze the pan. Pour juices over the top of the tenderloin in crockpot. Cook on high for 3 hours or until meat thermometer says 170*. Let rest 5-10 minutes and then slice.       Side Dish: Steamed carrots w/ garlic and irwin    Ingredients:  2 garlic cloves, minced  1 lb baby carrots  5-6 sprays of I cant believe its not butter SPRAY  1 tsp minced peeled fresh irwin  1 tbsp chopped fresh cilantro  ½ tsp grated lime rind  1 tbsp fresh lime juice   ¼ tsp salt    Prepare garlic; let stand 10 minutes. Steam carrots, covered in pot, about 10 minutes or until tender.     In a nonstick skillet over medium heat, spray pan w/ I cant believe its not butter SPRAY. Add garlic and irwin and cook 1 minute, stirring constantly. Remove from  heat; stir in carrots, cilantro and remaining ingredients.         Side Dish: Green Bean Almondine    Ingredients:  1 pound fresh green beans, trimmed  1 tbsp johnathan vinegar  1 ½ tsp water  1 tsp Rufus Mustard  ¼ tsp salt  ¼ tsp freshly ground black pepper  1 tbsp sliced almonds, toasted    Cook green beans in boiling water for 4 minutes or until crisp-tender. Drain and plunge beans into ice water. Drain well. Pat beans dry w/ paper towel.     Combine vinegar, water, mustard, salt and pepper in a medium bowl. Add beans to vinegar mixture; toss to coat well. Sprinkle with toasted almonds.      How to Cook the Perfect Hard Boiled Egg:    Steam in water: Fill a large pot with 1 inch of water. Place steamer insert inside, cover, and bring to a boil over high heat. Add eggs to steamer basket, cover, and continue cooking 12 minute. If serving cold,  immediately place eggs in a bowl of ice water and allow to cool for at least 15 minutes before peeling under cool running water. Store in the refrigerator for up to 5 days.    OR    Purchase Dash Go Rapid Egg Boiler: available @ [employer], [facility], [employer], [employer] for ~$15-$20      Classic Egg Salad Recipe:    Ingredients:  8 hard cooked eggs, peeled and coarsely chopped  4-6 tbsp of low fat or fat free alvarez  2 tbsp celery, chopped  2 tsp jie mustard  Dash of cayenne pepper (for spice)  Black pepper, salt to taste    In a medium bowl, combine alvarez, celery, mustard and cayenne pepper with chopped eggs. Season w/ pepper and salt. Serve over Lettuce leaves.     Get Creative! Add chopped turkey ritter and tomato and serve on lettuce leaves for an egg-cellent BLT egg salad or mix lean diced ham, low fat cheddar and tomatoes for  egg salad    Tuna Deviled Eggs:    Ingredients:  12 hard boiled eggs  1 can of tuna packed in water  1 rib celery, diced  ¼ cup low fat alvarez  1 tsp mustard  Garlic powder, black pepper to taste  Dash of paprika (for finish)    Cut eggs in half lengthwise. Remove yolk and mash in bowl. In separate bowl, mix tuna, celery, low fat alvarez, mustard and seasonings.  Stir in egg yolks until blended. Stuff eggs and sprinkle dash of paprika      Ritter Jalapeno Deviled Eggs:    Ingredients:  12 hard boiled eggs, peeled  ½ cup low fat alvarez  1 ½ tsp rice vinegar  ¾ tsp ground mustard  ½ packet splenda  2 jalapenos, seeded and chopped, 6 pieces turkey ritter, cooked crisp and crumbled  Dash of paprika (for finish)    Cut eggs in half lengthwise. Remove yolk and mash in bowl. Add alvarez, vinegar, spices and Splenda until well combined. Mix in jalapenos and ritter. Stuff eggs and sprinkle w/ dash of paprika      Sugar-Free High Protein Brownie Recipe:    Ingredients:  2 cups of pureed zucchini  4 oz fat free cream cheese  1 large egg  2 scoops chocolate protein powder  1 tbsp pure vanilla  extract  1/3 cup unsweetened cocoa powder    ¼ tsp salt  2 tbsp chopped nuts  *8-9 packets of splenda or artificial sweetener of choice    Preheat oven to 350* F.     Line an 8x8 pan w/ parchment paper or spray with nonstick cookspray.     In a medium bowl, blend the fat free cream cheese with egg until creamy. Add chocolate protein powder and cocoa powder until creamy. Add vanilla extract. Stir in pureed zucchini and salt.     The batter will be thick, but not dry. If batter seems dry, add 1-2 tbsp water. Fold in Nuts. Pour batter in prepared pan.     Bake for 30 minutes. Allow to cool completely. Cut into squares and chill to semi-set.     *best if eaten within 2 days but may last 3-4 days in fridge.         Lemon Whip:    Ingredients:  Small box of sugar-free vanilla instant pudding mix  1 small packet of crystal light lemonade mix  2 cups skim milk or fat free fairlife milk  Sugar-free, fat free cool whip     Make sugar-free pudding using 2 cups skim milk according to package. Stir in 1 small packet of crystal light lemonade mix.  Fold in a dollop of sugar-free, fat free cool whip and stir to combine.     Home-made Sugar-free Pudding Pops:    Ingredients:  1 small pkg of sugar-free instant pudding mix (flavor of choice!)  2 cups fat free milk     Beat ingredients with whisk for 2 minutes. Pour into 6 paper or plastic cups or into a popsicle mold. Insert wooden pop stick in center of each cup.     Freeze for 5 hours or until firm. Peel off paper or pull out of popsicle mold.     Variations: add sugar-free syrups to change up the flavor, such as sugar-free chocolate pudding + sugar free raspberry syrup = chocolate raspberry fudgesicle.

## 2024-05-01 NOTE — PROGRESS NOTES
Subjective     Patient ID: Syed Lyon is a 38 y.o. female.    Chief Complaint: Follow-up    Pt here today for follow-up. Has lost 5.8 lbs (-1.2 lbs muscle. -4.7 lbs fat).   Was to start 1000 omar pb meal planner and started Qsymia 7.5-46 last filled 2/19/24. On SE has been some tingling.   Eating eggs and toast in the morning, salad for lunch. Will have 100 omar packs, etc when she wants sweets, but still having some cravings. Smoothies. Less red meat.   Has been walking and sit ups for exercise daily. Still going to PT once a week.     New BMR: 1491    New PBF: 46.5%    Initial:   BMR:1502     PBF: 47.4        History of medication for loss:  checked today   Previously tried phentermine 37.5mg in 2021           Review of Systems       Objective   /65   Pulse 80   Wt 97.1 kg (214 lb)   LMP  (LMP Unknown)   SpO2 100%   BMI 34.54 kg/m²    Physical Exam  Vitals reviewed.   Constitutional:       General: She is not in acute distress.     Appearance: She is well-developed.   HENT:      Head: Normocephalic and atraumatic.   Eyes:      General: No scleral icterus.     Pupils: Pupils are equal, round, and reactive to light.   Cardiovascular:      Rate and Rhythm: Normal rate.   Pulmonary:      Effort: Pulmonary effort is normal.   Musculoskeletal:         General: Normal range of motion.   Skin:     General: Skin is warm and dry.      Findings: No erythema.   Neurological:      Mental Status: She is alert and oriented to person, place, and time.      Cranial Nerves: No cranial nerve deficit.   Psychiatric:         Behavior: Behavior normal.         Judgment: Judgment normal.            Assessment and Plan     1. Obesity, Class I, BMI 30.0-34.9 (see actual BMI)  -     phentermine-topiramate (QSYMIA) 11.25-69 mg CM24; Take 1 capsule by mouth once daily.  Dispense: 30 capsule; Refill: 4      Syed was seen today for follow-up.    Diagnoses and all orders for this visit:    Obesity, Class I, BMI 30.0-34.9 (see  actual BMI)  -     phentermine-topiramate (QSYMIA) 11.25-69 mg CM24; Take 1 capsule by mouth once daily.        Patient warned of common side effects of phentermine/topiraimate including anxiety, insomnia, palpitations and increased blood pressure.  She was informed of the potential side effects such as serious and possibly fatal rash in which case the medication should be discontinued immediately. Paresthesias, forgetfulness, fatigue, kidney stones, GI symptoms, and changes in lab values such as electrolytes, blood counts and kidney function.that stopping the medication without making lifestyle changes will result in regain of weight.  Patient states understanding.    Weight loss medications are controlled substances.  They require routine follow up. Prescription or pills that are lost or destroyed will not be replaced.             Increase low impact activity as cleared by PT.  Avoid high impact activity, very heavy lifting or other exercise regimens that may cause discomfort.     Add some type of resistance training 2-3 days a week. These can be body weight exercises, light weight or elastic bands. [employer] and [employer] are great sources for free work out plans and videos.       1000 omar pb meal planner, meal ideas given,      No follow-ups on file.

## 2024-05-08 ENCOUNTER — CLINICAL SUPPORT (OUTPATIENT)
Dept: REHABILITATION | Facility: HOSPITAL | Age: 39
End: 2024-05-08
Payer: COMMERCIAL

## 2024-05-08 DIAGNOSIS — G89.29 CHRONIC LEFT SHOULDER PAIN: Primary | ICD-10-CM

## 2024-05-08 DIAGNOSIS — M25.512 CHRONIC LEFT SHOULDER PAIN: Primary | ICD-10-CM

## 2024-05-08 PROCEDURE — 97112 NEUROMUSCULAR REEDUCATION: CPT

## 2024-05-08 PROCEDURE — 97110 THERAPEUTIC EXERCISES: CPT

## 2024-05-08 NOTE — PROGRESS NOTES
[facility]   Physical Therapy Treatment Note      Name: Syde Lyon  [facility] Number: [id number]    Therapy Diagnosis:   Encounter Diagnosis   Name Primary?    Chronic left shoulder pain Yes     Physician: Fatimah Moya PA-C    Visit Date: 5/8/2024    Physician Orders: PT Eval and Treat   Medical Diagnosis from Referral: Superior labrum anterior-to-posterior (SLAP) tear of left shoulderChronic left shoulder pain / Superior labrum anterior-to-posterior (SLAP) tear of left shoulder  Evaluation Date: 3/4/2024  Authorization Period Expiration: 02/26/2024 - 12/31/2024  Plan of Care Expiration: 7/4/24  Progress Note Due: 4/4/24  Visit # / Visits authorized: 8 / 20     FOTO: 63%  FOTO 2: 84%  FOTO 3:  DC FOTO @: 72%    PTA Visit #: 0/5     Time In: 917 am  Time Out: 1003 am   Total Billable Time: 46 minutes    Subjective     Pt reports: a little pain in L shoulder with certain movements but starting IVF and is not interested in surgery. Agreeable to discharge with continuation of HEP.     She was compliant with home exercise program.  Response to previous treatment: improved pain  Functional change: no change    Pain: 1/10  Location: L shoulder    Objective      Objective Measures updated at progress report unless specified.     Imaging:  XRAY (L shoulder): No acute or significant bony findings.  MRI (L shoulder):   ROTATOR CUFF: Supraspinatus, infraspinatus, subscapularis and teres minor tendons are intact.  Muscle bulk is preserved.     LABRUM: Contrast imbibition within the superior labrum that extends from anterior to posterior (2-9 o'clock). No paralabral cyst.  Remaining labral segments demonstrate normal morphology and signal intensity.     BICEPS: Thickening and increased signal intensity of the intra-articular biceps tendon.     BONES: No fractures.  No avascular necrosis.  No infiltrative process.     AC JOINT: Mild AC capsular thickening.  Flat morphology of the lateral acromion  "without undersurface spurring.     CARTILAGE: Intact without partial or full-thickness defects.     MISCELLANEOUS: Subacromial/subdeltoid bursa intact.  Superior, middle and inferior glenohumeral ligaments are normal.  Coracohumeral ligament is normal.  No axillary lymphadenopathy.     Impression:     1. Superior labral tear that extends from anterior to posterior and involves the biceps anchor.    Functional Shoulder ROM:     Right Left   ER overhead T4 C4   IR behind back T6 T12      Active Range of Motion:   Shoulder Right Left   Flexion 170 165   Abduction 180 155   ER at 0 85 70   ER at 90 95 55   IR 70 45       Treatment     Syed received the treatments listed below:      Syed participated in neuromuscular re-education activities to improve: Balance, Coordination, Kinesthetic, Sense, Proprioception and Posture for 23 minutes. The following activities were included:  L IR/ER GTB 3 x 10  L horizontal abduction GTB 3 x 10  L ER and punch out GTB 3 x 10  Serratus punch 4# 3 x 10  L IR/ER body blade 5 x 30"  L ball on wall circles yellow med ball CW/CCW 5 x 10  Prone:  Row 4# 3 x 10   scap set + ext 3" 3 x 10  T 3 x 10    Syed received therapeutic exercises to develop strength, endurance, ROM, flexibility, posture and core stabilization for 23 minutes including:     Sidelying:  ER 3# 3 x 10  Horizontal Abd 2#  3 x10  Mod Deltoid 2# 3 x 10         Patient Education and Home Exercises       Education provided:   - continue HEP     Written Home Exercises Provided: Patient instructed to cont prior HEP. Exercises were reviewed and Syed was able to demonstrate them prior to the end of the session.  Syed demonstrated good  understanding of the education provided. See EMR under Patient Instructions for exercises provided during therapy sessions    Assessment     Pt may have achieved what she can as far as function and pain wise. She wants to discharge and continue HEP. Is starting IVF and plans on having another kid " so surgery isn't option at this point. Updated her HEP last visit and is independent with continuing. Therefore, patient will discharge.    Syed Is progressing well towards her goals.   Pt prognosis is Good.     Pt will continue to benefit from skilled outpatient physical therapy to address the deficits listed in the problem list box on initial evaluation, provide pt/family education and to maximize pt's level of independence in the home and community environment.     Pt's spiritual, cultural and educational needs considered and pt agreeable to plan of care and goals.     Anticipated barriers to physical therapy: none      Goals:  Short Term Goals:  4 weeks - MET   1. Report decreased L shoulder pain < / =  8/10  to increase tolerance for ADLs  2. Increase PROM L shoulder WNL  3. Increased strength by 1/3 MMT grade in L shoulder to increase tolerance for ADL and work activities.  4. Pt to tolerate HEP to improve ROM and independence with ADL's     Long Term Goals: 8 weeks - not met, progressing   1. Report decreased L shoulder pain  < / =  3 /10  to increase tolerance for ADLs  2. Increase AROM to WNL  3. Increase strength to >/= 4/5 in L shoulder/scapula to increase tolerance for ADL and work activities.  4. Pt goal: fasten bra pain-free  5. Pt will have improved gcode of CJ (20-40% limited) on FOTO shoulder in order to demonstrate true functional improvement.     Plan     Discharge from PT    Billie Thompson PT

## 2024-05-17 ENCOUNTER — OFFICE VISIT (OUTPATIENT)
Dept: SPORTS MEDICINE | Facility: CLINIC | Age: 39
End: 2024-05-17
Payer: COMMERCIAL

## 2024-05-17 VITALS
HEART RATE: 93 BPM | BODY MASS INDEX: 33.55 KG/M2 | DIASTOLIC BLOOD PRESSURE: 72 MMHG | SYSTOLIC BLOOD PRESSURE: 107 MMHG | WEIGHT: 208.75 LBS | HEIGHT: 66 IN

## 2024-05-17 DIAGNOSIS — G89.29 CHRONIC PAIN IN LEFT SHOULDER: ICD-10-CM

## 2024-05-17 DIAGNOSIS — S43.432A SUPERIOR LABRUM ANTERIOR-TO-POSTERIOR (SLAP) TEAR OF LEFT SHOULDER: Primary | ICD-10-CM

## 2024-05-17 DIAGNOSIS — M25.512 CHRONIC PAIN IN LEFT SHOULDER: ICD-10-CM

## 2024-05-17 PROCEDURE — 3078F DIAST BP <80 MM HG: CPT | Mod: CPTII,S$GLB,, | Performed by: PHYSICIAN ASSISTANT

## 2024-05-17 PROCEDURE — 99214 OFFICE O/P EST MOD 30 MIN: CPT | Mod: S$GLB,,, | Performed by: PHYSICIAN ASSISTANT

## 2024-05-17 PROCEDURE — 99999 PR PBB SHADOW E&M-EST. PATIENT-LVL III: CPT | Mod: PBBFAC,,, | Performed by: PHYSICIAN ASSISTANT

## 2024-05-17 PROCEDURE — 3074F SYST BP LT 130 MM HG: CPT | Mod: CPTII,S$GLB,, | Performed by: PHYSICIAN ASSISTANT

## 2024-05-17 PROCEDURE — 3008F BODY MASS INDEX DOCD: CPT | Mod: CPTII,S$GLB,, | Performed by: PHYSICIAN ASSISTANT

## 2024-05-17 PROCEDURE — 1159F MED LIST DOCD IN RCRD: CPT | Mod: CPTII,S$GLB,, | Performed by: PHYSICIAN ASSISTANT
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
## 2024-05-24 ENCOUNTER — HOSPITAL ENCOUNTER (EMERGENCY)
Facility: HOSPITAL | Age: 39
Discharge: HOME | End: 2024-05-24
Attending: EMERGENCY MEDICINE
Payer: COMMERCIAL

## 2024-05-24 VITALS
TEMPERATURE: 98 F | HEIGHT: 66 IN | HEART RATE: 87 BPM | SYSTOLIC BLOOD PRESSURE: 120 MMHG | OXYGEN SATURATION: 100 % | BODY MASS INDEX: 32.95 KG/M2 | RESPIRATION RATE: 20 BRPM | DIASTOLIC BLOOD PRESSURE: 69 MMHG | WEIGHT: 205 LBS

## 2024-05-24 DIAGNOSIS — L50.9 HIVES: ICD-10-CM

## 2024-05-24 DIAGNOSIS — T78.40XA ALLERGIC REACTION, INITIAL ENCOUNTER: Primary | ICD-10-CM

## 2024-05-24 LAB
B-HCG UR QL: NEGATIVE
CTP QC/QA: YES

## 2024-05-24 PROCEDURE — 63600175 PHARM REV CODE 636 W HCPCS: Performed by: PHYSICIAN ASSISTANT

## 2024-05-24 PROCEDURE — 99284 EMERGENCY DEPT VISIT MOD MDM: CPT | Mod: 25

## 2024-05-24 PROCEDURE — 81025 URINE PREGNANCY TEST: CPT | Performed by: PHYSICIAN ASSISTANT

## 2024-05-24 PROCEDURE — 96372 THER/PROPH/DIAG INJ SC/IM: CPT | Performed by: PHYSICIAN ASSISTANT

## 2024-05-24 RX ORDER — AZITHROMYCIN 250 MG/1
500 TABLET, FILM COATED ORAL DAILY
Qty: 6 TABLET | Refills: 0 | Status: SHIPPED | OUTPATIENT
Start: 2024-05-24 | End: 2024-05-27

## 2024-05-24 RX ORDER — FAMOTIDINE 20 MG/1
20 TABLET, FILM COATED ORAL 2 TIMES DAILY
Qty: 10 TABLET | Refills: 0 | Status: SHIPPED | OUTPATIENT
Start: 2024-05-24 | End: 2024-05-29

## 2024-05-24 RX ORDER — PREDNISONE 20 MG/1
40 TABLET ORAL DAILY
Qty: 10 TABLET | Refills: 0 | Status: SHIPPED | OUTPATIENT
Start: 2024-05-24 | End: 2024-05-29

## 2024-05-24 RX ORDER — CEPHALEXIN 500 MG/1
500 CAPSULE ORAL
COMMUNITY
Start: 2024-05-24 | End: 2024-06-03

## 2024-05-24 RX ORDER — FAMOTIDINE 10 MG/ML
20 INJECTION INTRAVENOUS
Status: DISCONTINUED | OUTPATIENT
Start: 2024-05-24 | End: 2024-05-24

## 2024-05-24 RX ORDER — DEXAMETHASONE SODIUM PHOSPHATE 4 MG/ML
6 INJECTION, SOLUTION INTRA-ARTICULAR; INTRALESIONAL; INTRAMUSCULAR; INTRAVENOUS; SOFT TISSUE
Status: COMPLETED | OUTPATIENT
Start: 2024-05-24 | End: 2024-05-24

## 2024-05-24 RX ORDER — DEXAMETHASONE SODIUM PHOSPHATE 4 MG/ML
6 INJECTION, SOLUTION INTRA-ARTICULAR; INTRALESIONAL; INTRAMUSCULAR; INTRAVENOUS; SOFT TISSUE
Status: DISCONTINUED | OUTPATIENT
Start: 2024-05-24 | End: 2024-05-24

## 2024-05-24 RX ORDER — DIPHENHYDRAMINE HYDROCHLORIDE 50 MG/ML
50 INJECTION INTRAMUSCULAR; INTRAVENOUS
Status: DISCONTINUED | OUTPATIENT
Start: 2024-05-24 | End: 2024-05-24

## 2024-05-24 RX ADMIN — DEXAMETHASONE SODIUM PHOSPHATE 6 MG: 4 INJECTION INTRA-ARTICULAR; INTRALESIONAL; INTRAMUSCULAR; INTRAVENOUS; SOFT TISSUE at 08:05
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
## 2024-05-25 NOTE — ED PROVIDER NOTES
Encounter Date: 5/24/2024    SCRIBE #1 NOTE: I, Nneka Moyer, am scribing for, and in the presence of,  Dorinda Thomason PA-C. I have scribed the following portions of the note - Other sections scribed: HPI, ROS.       History     Chief Complaint   Patient presents with    Allergic Reaction     Reports taking kefelx at 3pm today and began to have hives and itching all over body Reports on antibiotics for strep throat, denies sob. Reports taking benadryl at 1615.      Syed Lyon is a 38 y.o. female with no relevant PMHx that presents to the ED for hives that started after taking Keflex at approximately 3:15pm today. The patient notes itchy hives to her back, abdomen, chest, arms, and the right side of her face. The patient reports taking Benadryl at 4:15pm in attempt to alleviate symptoms. The patient reports taking Keflex for strep throat. She reports having nasal congestion, sore throat, and pain when swallowing for the past 2 weeks. She notes thinking that her symptoms were due to allergies at first, but reports that her daughter tested positive for strep yesterday. The patient notes having multiple allergies, but denies any recent exposure. Can tolerate PO. The patient denies chest pain, SOB, or any other complaints at this time     The history is provided by the patient. No  was used.     Review of patient's allergies indicates:   Allergen Reactions    Broccoli flower Hives, Itching and Swelling    Mold Hives, Itching and Swelling    Mustard Hives, Itching and Swelling    Shellfish containing products Hives, Itching and Swelling    Soy Hives, Itching and Swelling    Strawberry Hives, Itching and Swelling    Keflex [cephalexin] Hives and Rash     Past Medical History:   Diagnosis Date    Abnormal Pap smear of cervix     cryo yrs. ago, then 3-2017 hpv positive, pap normal, 2017 colpo ECC normal,     Endometriosis     per dx lap Bad endo per pt    HPV (human papilloma virus)  infection     Infertility, female         Oral contraceptive use     PID (pelvic inflammatory disease)      Past Surgical History:   Procedure Laterality Date     SECTION      COLONOSCOPY N/A 2017    Procedure: COLONOSCOPY;  Surgeon: Jose Zaidi MD;  Location: [facility] (4TH FLR);  Service: Endoscopy;  Laterality: N/A;    fallopian tube removal Bilateral     GYNECOLOGIC CRYOSURGERY      HYSTEROSCOPY      2015    In Vitro      Dr Anuja Santana    INJECTION OF BOTULINUM TOXIN TYPE A N/A 2022    Procedure: INJECTION, BOTULINUM TOXIN, TYPE A;  Surgeon: RADHA Bahena MD;  Location: [facility] OR 2ND FLR;  Service: Colon and Rectal;  Laterality: N/A;    PELVIC EXAMINATION UNDER ANESTHESIA N/A 2022    Procedure: EXAM UNDER ANESTHESIA, PELVIS;  Surgeon: RADHA Bahena MD;  [facility];  Service: Colon and Rectal;  Laterality: N/A;     Family History   Problem Relation Name Age of Onset    Heart disease Father      No Known Problems Mother      No Known Problems Daughter      Meningitis Brother      Breast cancer Neg Hx      Colon cancer Neg Hx      Ovarian cancer Neg Hx      Cancer Neg Hx      Colon polyps Neg Hx      Celiac disease Neg Hx      Esophageal cancer Neg Hx      Stomach cancer Neg Hx      Irritable bowel syndrome Neg Hx      Inflammatory bowel disease Neg Hx       Social History     Tobacco Use    Smoking status: Never    Smokeless tobacco: Never   Substance Use Topics    Alcohol use: Yes     Comment: occasional    Drug use: No     Review of Systems   Constitutional:  Negative for chills and fever.   HENT:  Positive for congestion, sore throat and trouble swallowing. Negative for ear pain, nosebleeds and rhinorrhea.    Eyes:  Negative for redness.   Respiratory:  Negative for cough, shortness of breath and stridor.    Cardiovascular:  Negative for chest pain.   Gastrointestinal:  Negative for abdominal pain, constipation, diarrhea, nausea and  vomiting.   Genitourinary:  Negative for decreased urine volume, dysuria, frequency, hematuria and urgency.   Musculoskeletal:  Negative for back pain and neck pain.   Skin:  Positive for rash. Negative for wound.        (+) itchiness   Neurological:  Negative for dizziness, speech difficulty, weakness, light-headedness, numbness and headaches.   Hematological:  Does not bruise/bleed easily.   Psychiatric/Behavioral:  Negative for confusion.        Physical Exam     Initial Vitals [05/24/24 1803]   BP Pulse Resp Temp SpO2   120/69 87 20 97.7 °F (36.5 °C) 100 %      MAP       --         Physical Exam    Nursing note and vitals reviewed.  Constitutional: She appears well-developed and well-nourished. No distress.   HENT:   Head: Normocephalic.   Right Ear: External ear normal.   Left Ear: External ear normal.   Mouth/Throat: Uvula is midline and mucous membranes are normal. No oropharyngeal exudate, posterior oropharyngeal edema or posterior oropharyngeal erythema.   Eyes: Conjunctivae are normal. Right eye exhibits no discharge. Left eye exhibits no discharge. No scleral icterus.   Neck: No tracheal deviation present.   Cardiovascular:  Normal rate and regular rhythm.     Exam reveals no gallop and no friction rub.       No murmur heard.  Pulmonary/Chest: Breath sounds normal. No stridor. No respiratory distress. She has no wheezes. She has no rhonchi. She has no rales.   Musculoskeletal:         General: Normal range of motion.     Neurological: She is alert.   Skin: Skin is warm and dry. No rash noted. No erythema.   Resolving hives to the back      Psychiatric: She has a normal mood and affect. Her behavior is normal. Judgment and thought content normal.         ED Course   Procedures  Labs Reviewed   POCT URINE PREGNANCY          Imaging Results    None          Medications   dexAMETHasone injection 6 mg (6 mg Intramuscular Given 5/24/24 2027)     Medical Decision Making  39 y/o F presenting for allergic reaction  after taking keflex.  Exam above. No cp sob abdominal pain nausea, vomiting diarrhea  No evidence of anaphylaxis, sjs, tens.  UPT negative. Decadron IM given. Will have her follow up with allergist.   Return to ER for worsening symptoms or as needed.   Follow up with primary care in 2 days. Will change keflex to azithro due to allergy    Amount and/or Complexity of Data Reviewed  Labs: ordered.    Risk  Prescription drug management.            Scribe Attestation:   Scribe #1: I performed the above scribed service and the documentation accurately describes the services I performed. I attest to the accuracy of the note.                         I, Dorinda Thomason PA-C , personally performed the services described in this documentation. All medical record entries made by the scribe were at my direction and in my presence. I have reviewed the chart and agree that the record reflects my personal performance and is accurate and complete.        Clinical Impression:  Final diagnoses:  [T78.40XA] Allergic reaction, initial encounter (Primary)  [L50.9] Hives          ED Disposition Condition    Discharge Stable          ED Prescriptions       Medication Sig Dispense Start Date End Date Auth. Provider    azithromycin (Z-NADER) 250 MG tablet Take 2 tablets (500 mg total) by mouth once daily. Take first 2 tablets together, then 1 every day until finished. for 3 days 6 tablet 5/24/2024 5/27/2024 Dorinda Thomason PA-C    predniSONE (DELTASONE) 20 MG tablet Take 2 tablets (40 mg total) by mouth once daily. for 5 days 10 tablet 5/24/2024 5/29/2024 Dorinda Thomason PA-C    famotidine (PEPCID) 20 MG tablet Take 1 tablet (20 mg total) by mouth 2 (two) times daily. for 5 days 10 tablet 5/24/2024 5/29/2024 Dorinda Thomason PA-C          Follow-up Information       Follow up With Specialties Details Why Contact Info    Mariah Livingston MD Internal Medicine Schedule an appointment as soon as possible for a visit in 2  days for follow up [address]  [facility] [address]  [phone]      [facility] - Emergency Dept Emergency Medicine Go to  As needed, If symptoms worsen [address]  [facility] [place] [id number] [phone]             Dorinda Thomason PA-C  05/24/24 4784

## 2024-05-25 NOTE — DISCHARGE INSTRUCTIONS

## 2024-05-30 ENCOUNTER — OFFICE VISIT (OUTPATIENT)
Dept: SPORTS MEDICINE | Facility: CLINIC | Age: 39
End: 2024-05-30
Payer: COMMERCIAL

## 2024-05-30 VITALS
DIASTOLIC BLOOD PRESSURE: 73 MMHG | HEIGHT: 66 IN | WEIGHT: 205.69 LBS | HEART RATE: 84 BPM | BODY MASS INDEX: 33.06 KG/M2 | SYSTOLIC BLOOD PRESSURE: 114 MMHG

## 2024-05-30 DIAGNOSIS — S43.432A SUPERIOR LABRUM ANTERIOR-TO-POSTERIOR (SLAP) TEAR OF LEFT SHOULDER: ICD-10-CM

## 2024-05-30 DIAGNOSIS — M67.80 TENDINOSIS: ICD-10-CM

## 2024-05-30 DIAGNOSIS — M25.512 CHRONIC PAIN IN LEFT SHOULDER: Primary | ICD-10-CM

## 2024-05-30 DIAGNOSIS — M67.912 DYSFUNCTION OF LEFT ROTATOR CUFF: ICD-10-CM

## 2024-05-30 DIAGNOSIS — S46.819S SPRAIN OF SUPRASPINATUS MUSCLE OR TENDON, UNSPECIFIED LATERALITY, SEQUELA: ICD-10-CM

## 2024-05-30 DIAGNOSIS — G89.29 CHRONIC PAIN IN LEFT SHOULDER: Primary | ICD-10-CM

## 2024-05-30 PROCEDURE — 99999 PR PBB SHADOW E&M-EST. PATIENT-LVL III: CPT | Mod: PBBFAC,,, | Performed by: FAMILY MEDICINE

## 2024-05-30 PROCEDURE — 3074F SYST BP LT 130 MM HG: CPT | Mod: CPTII,S$GLB,, | Performed by: FAMILY MEDICINE

## 2024-05-30 PROCEDURE — 1160F RVW MEDS BY RX/DR IN RCRD: CPT | Mod: CPTII,S$GLB,, | Performed by: FAMILY MEDICINE

## 2024-05-30 PROCEDURE — 3008F BODY MASS INDEX DOCD: CPT | Mod: CPTII,S$GLB,, | Performed by: FAMILY MEDICINE

## 2024-05-30 PROCEDURE — 3078F DIAST BP <80 MM HG: CPT | Mod: CPTII,S$GLB,, | Performed by: FAMILY MEDICINE

## 2024-05-30 PROCEDURE — 1159F MED LIST DOCD IN RCRD: CPT | Mod: CPTII,S$GLB,, | Performed by: FAMILY MEDICINE

## 2024-05-30 PROCEDURE — 20611 DRAIN/INJ JOINT/BURSA W/US: CPT | Mod: LT,S$GLB,, | Performed by: FAMILY MEDICINE

## 2024-05-30 PROCEDURE — 99214 OFFICE O/P EST MOD 30 MIN: CPT | Mod: 25,S$GLB,, | Performed by: FAMILY MEDICINE

## 2024-05-30 RX ORDER — TRIAMCINOLONE ACETONIDE 40 MG/ML
40 INJECTION, SUSPENSION INTRA-ARTICULAR; INTRAMUSCULAR
Status: DISCONTINUED | OUTPATIENT
Start: 2024-05-30 | End: 2024-05-30 | Stop reason: HOSPADM

## 2024-05-30 RX ADMIN — TRIAMCINOLONE ACETONIDE 40 MG: 40 INJECTION, SUSPENSION INTRA-ARTICULAR; INTRAMUSCULAR at 10:05

## 2024-05-30 NOTE — PROCEDURES
"Tendon Origin    Date/Time: 5/30/2024 10:45 AM    Performed by: Brayan Gramajo MD  Authorized by: Brayan Gramajo MD    Consent Done?:  Yes (Verbal)  Timeout: prior to procedure the correct patient, procedure, and site was verified    Indications:  Pain  Site marked: the procedure site was marked    Timeout: prior to procedure the correct patient, procedure, and site was verified    Location: shoulder.  Prep: patient was prepped and draped in usual sterile fashion    Ultrasonic Guidance for Needle Placement?: Yes    Needle size:  25 G  Approach:  Posterolateral  Medications:  40 mg triamcinolone acetonide 40 mg/mL  Patient tolerance:  Patient tolerated the procedure well with no immediate complications   Supraspinatus tendon and tendon insertion injection LEFT    Description of ultrasound utilization for needle guidance:   Ultrasound guidance used for needle localization. Images saved and stored for documentation. The supraspinatus muscle, myotendinous junction, and tendon insertion on the greater tuberosity of the humeral head were visualized. Dynamic visualization of the 22g x 1.5" needle was continuous throughout the procedure.    "

## 2024-05-30 NOTE — PROCEDURES
"Large Joint Aspiration/Injection: L glenohumeral    Date/Time: 5/30/2024 10:45 AM    Performed by: Brayan Gramajo MD  Authorized by: Brayan Gramajo MD    Consent Done?:  Yes (Verbal)  Indications:  Pain  Site marked: the procedure site was marked    Timeout: prior to procedure the correct patient, procedure, and site was verified    Prep: patient was prepped and draped in usual sterile fashion      Details:  Needle Size:  22 G  Ultrasonic Guidance for needle placement?: Yes    Images are saved and documented.  Approach:  Posterior  Location:  Shoulder  Site:  L glenohumeral  Medications:  40 mg triamcinolone acetonide 40 mg/mL  Patient tolerance:  Patient tolerated the procedure well with no immediate complications     Description of ultrasound utilization for needle guidance:   Ultrasound guidance used for needle localization. Images saved and stored for documentation. The glenohumeral joint was visualized. Dynamic visualization of the 22g x 3.5" needle was continuous throughout the procedure.     "

## 2024-05-31 ENCOUNTER — PATIENT MESSAGE (OUTPATIENT)
Dept: BARIATRICS | Facility: CLINIC | Age: 39
End: 2024-05-31
Payer: COMMERCIAL

## 2024-06-10 ENCOUNTER — PATIENT MESSAGE (OUTPATIENT)
Dept: INTERNAL MEDICINE | Facility: CLINIC | Age: 39
End: 2024-06-10
Payer: COMMERCIAL

## 2024-07-07 ENCOUNTER — NURSE TRIAGE (OUTPATIENT)
Dept: ADMINISTRATIVE | Facility: CLINIC | Age: 39
End: 2024-07-07
Payer: COMMERCIAL

## 2024-07-07 ENCOUNTER — HOSPITAL ENCOUNTER (EMERGENCY)
Facility: HOSPITAL | Age: 39
Discharge: HOME | End: 2024-07-07
Attending: EMERGENCY MEDICINE
Payer: COMMERCIAL

## 2024-07-07 VITALS
HEIGHT: 66 IN | WEIGHT: 220 LBS | DIASTOLIC BLOOD PRESSURE: 69 MMHG | RESPIRATION RATE: 18 BRPM | HEART RATE: 91 BPM | BODY MASS INDEX: 35.36 KG/M2 | TEMPERATURE: 98 F | SYSTOLIC BLOOD PRESSURE: 106 MMHG | OXYGEN SATURATION: 100 %

## 2024-07-07 DIAGNOSIS — T14.8XXA SKIN AVULSION: Primary | ICD-10-CM

## 2024-07-07 PROCEDURE — 25000003 PHARM REV CODE 250: Performed by: EMERGENCY MEDICINE

## 2024-07-07 PROCEDURE — 90471 IMMUNIZATION ADMIN: CPT | Performed by: EMERGENCY MEDICINE

## 2024-07-07 PROCEDURE — 63600175 PHARM REV CODE 636 W HCPCS: Performed by: EMERGENCY MEDICINE

## 2024-07-07 PROCEDURE — 90715 TDAP VACCINE 7 YRS/> IM: CPT | Performed by: EMERGENCY MEDICINE

## 2024-07-07 PROCEDURE — 99284 EMERGENCY DEPT VISIT MOD MDM: CPT | Mod: 25

## 2024-07-07 RX ORDER — ACETAMINOPHEN 500 MG
1000 TABLET ORAL
Status: COMPLETED | OUTPATIENT
Start: 2024-07-07 | End: 2024-07-07

## 2024-07-07 RX ORDER — BACITRACIN ZINC 500 UNIT/G
OINTMENT (GRAM) TOPICAL DAILY
Qty: 113 G | Refills: 0 | Status: SHIPPED | OUTPATIENT
Start: 2024-07-07

## 2024-07-07 RX ADMIN — TETANUS TOXOID, REDUCED DIPHTHERIA TOXOID AND ACELLULAR PERTUSSIS VACCINE, ADSORBED 0.5 ML: 5; 2.5; 8; 8; 2.5 SUSPENSION INTRAMUSCULAR at 06:07

## 2024-07-07 RX ADMIN — BACITRACIN ZINC, NEOMYCIN, POLYMYXIN B 1 EACH: 400; 3.5; 5 OINTMENT TOPICAL at 07:07

## 2024-07-07 RX ADMIN — Medication: at 06:07

## 2024-07-07 RX ADMIN — ACETAMINOPHEN 1000 MG: 500 TABLET ORAL at 06:07

## 2024-07-07 NOTE — TELEPHONE ENCOUNTER
Apple corer laceration to left hand thumb. Pt was slicing an apple when laceration occurred. States she believes it is sliced very deep, some skin was taken off which pt has with her and is on the way to the ED during call. Wound is still actively bleeding, but not spurting. She covered with dressing prior to call and is putting direct pressure correctly during call. Advised pt doing the correct thing by going to nearest ED and she should continue on her way. She VU.  Reason for Disposition   Skin is split open or gaping (or length > 1/2 inch or 12 mm)    Additional Information   Negative: [1] Major bleeding (e.g., spurting blood) AND [2] can't be stopped   Negative: Amputated finger    Protocols used: Finger Injury-A-AH

## 2024-07-07 NOTE — ED PROVIDER NOTES
Encounter Date: 2024       History     Chief Complaint   Patient presents with    Finger Injury     Skin off L thumb from apple core remover     HPI  38-year-old female with past medical history as noted below coming in with a thumb avulsion to her left hand.  She was preparing a meal for her daughter when she was trying to core and apple with a new kitchen stool she accidentally clipped her left thumb causing part of her skin of the left thumb to be removed from the thumb.    This happened about 1.5 hours prior to coming to the emergency department.  She says she is having significant pain to the thumb and he was having bleeding at home.  She denies any other injuries.    Was in her usual state of health prior to the injury.    Review of patient's allergies indicates:   Allergen Reactions    Broccoli flower Hives, Itching and Swelling    Mold Hives, Itching and Swelling    Mustard Hives, Itching and Swelling    Shellfish containing products Hives, Itching and Swelling    Soy Hives, Itching and Swelling    Strawberry Hives, Itching and Swelling    Keflex [cephalexin] Hives and Rash     Past Medical History:   Diagnosis Date    Abnormal Pap smear of cervix     cryo yrs. ago, then 3- hpv positive, pap normal, 2017 colpo ECC normal,     Endometriosis     per dx lap Bad endo per pt    HPV (human papilloma virus) infection     Infertility, female         Oral contraceptive use     PID (pelvic inflammatory disease)      Past Surgical History:   Procedure Laterality Date     SECTION      COLONOSCOPY N/A 2017    Procedure: COLONOSCOPY;  Surgeon: Jose Zaidi MD;  Location: [facility] ([address] [place] [address]);  Service: Endoscopy;  Laterality: N/A;    fallopian tube removal Bilateral     GYNECOLOGIC CRYOSURGERY      HYSTEROSCOPY      2015    In Vitro      Dr Anuja Santana    INJECTION OF BOTULINUM TOXIN TYPE A N/A 2022    Procedure: INJECTION, BOTULINUM TOXIN, TYPE A;  Surgeon: RADHA Arreguin  MD Shruti;  Location: [facility] OR [address] [place] [address];  Service: Colon and Rectal;  Laterality: N/A;    PELVIC EXAMINATION UNDER ANESTHESIA N/A 12/7/2022    Procedure: EXAM UNDER ANESTHESIA, PELVIS;  Surgeon: RADHA Bahena MD;  Location: [facility] OR [address] [place] [address];  Service: Colon and Rectal;  Laterality: N/A;     Family History   Problem Relation Name Age of Onset    Heart disease Father      No Known Problems Mother      No Known Problems Daughter      Meningitis Brother      Breast cancer Neg Hx      Colon cancer Neg Hx      Ovarian cancer Neg Hx      Cancer Neg Hx      Colon polyps Neg Hx      Celiac disease Neg Hx      Esophageal cancer Neg Hx      Stomach cancer Neg Hx      Irritable bowel syndrome Neg Hx      Inflammatory bowel disease Neg Hx       Social History     Tobacco Use    Smoking status: Never    Smokeless tobacco: Never   Substance Use Topics    Alcohol use: Yes     Comment: occasional    Drug use: No     Review of Systems    Physical Exam     Initial Vitals [07/07/24 1636]   BP Pulse Resp Temp SpO2   (!) 110/57 98 18 98.5 °F (36.9 °C) 99 %      MAP       --         Physical Exam    Physical Exam:  CONSTITUTIONAL: Well developed, well nourished, in no acute distress, calm  HENT: Normocephalic, atraumatic    EYES: Sclerae anicteric   NECK: Supple  RESPIRATORY: Breathing comfortably. Speaking in full sentences   NEUROLOGIC: Alert, interacting normally. No facial droop.   MSK: Moving all four extremities. No visible deformities.  Normal flexion and extension throughout the thumb.  SKIN:  There is a 1 cm x 1.5 cm avulsion to her lateral left thumb which is oozing blood.  No exposed bone, relatively superficial nail bed is intact.  Rest of fingers intact neurovascularly as well as no bony tenderness.  No visible rash on exposed areas of skin.    PSYCH:  Very anxious, intermittently tearful.          ED Course   Procedures  Labs Reviewed - No data to display       Imaging Results    None          Medications   LETS  (LIDOcaine-TETRAcaine-EPINEPHrine) gel solution ( Topical (Top) Given 7/7/24 1800)   Tdap (BOOSTRIX) vaccine injection 0.5 mL (0.5 mLs Intramuscular Given 7/7/24 1823)   acetaminophen tablet 1,000 mg (1,000 mg Oral Given 7/7/24 1823)   neomycin-bacitracnZn-polymyxnB packet (1 each Topical (Top) Given 7/7/24 1952)     Medical Decision Making  Risk  OTC drugs.  Prescription drug management.      38-year-old female with past history as noted with left thumb skin avulsion.  Lateral left thumb.  No signs of bony injury, no signs of ligamentous injury, no signs of concerning nerve injury, small oozing but no signs of arterial bleeding.  No nail bed injury.    Given her pain, anxiety will place let on the wound for numbing.  Tylenol for pain control.  She is on aware of when her last tetanus was will update with Tdap.    Once wound is numb will cleaned with tap water.  There is no laceration, clear avulsion, there is nothing to suture or re-attached this point.  Will heal by secondary intention.    Plan for discharge with wound care, antibiotic ointment, outpatient follow-up and ED return precautions.    Update:  Pain likely improved with LET.  Wound irrigated, antibiotic ointment placed and bandaged.  Pictured as above, there is no laceration to close.  Recommend daily topical antibiotics at home, keep wound covered and follow up with primary care doctor for continued management for continued minor symptoms.  Given her lack of risk factors, this wound should heal well no indication for antibiotics at this time.    OTC medications as needed for pain control.    Findings of ED work up and return precautions verbally explained to patient. Patient agrees with discharge plan, return instructions and verbalizes understanding of return precautions.                                       Clinical Impression:  Final diagnoses:  [T14.8XXA] Skin avulsion (Primary)          ED Disposition Condition    Discharge Stable          ED  Prescriptions       Medication Sig Dispense Start Date End Date Auth. Provider    bacitracin 500 unit/gram Oint Apply topically once daily. Apply with bandage changes. 113 g 7/7/2024 -- Ede Sahni MD          Follow-up Information    None          Ede Sahni MD  07/08/24 1112

## 2024-07-07 NOTE — ED NOTES
LOC: The patient is awake, alert, and oriented to self, place, time, and situation. Pt is calm and cooperative. Affect is appropriate.  Speech is appropriate and clear.     APPEARANCE: Patient resting uncomfortably, laceration left thumb, in no acute distress.  Patient is clean and well groomed.    SKIN: The skin is warm and dry; color consistent with ethnicity.  Patient has normal skin turgor and moist mucus membranes.  Laceration to left thumb. Bleeding controlled.     MUSCULOSKELETAL: Patient moving upper and lower extremities without difficulty; denies pain in the extremities or back.  Denies weakness.     RESPIRATORY: Airway is open and patent. Respirations spontaneous, even, easy, and non-labored.  Patient has a normal effort and rate.  No accessory muscle use noted. Denies cough.     CARDIAC: No peripheral edema noted. No complaints of chest pain.     ABDOMEN: Soft and non tender to palpation.  No distention noted. Pt denies abdominal pain; denies nausea, vomiting, diarrhea, or constipation.    NEUROLOGIC: Eyes open spontaneously.  Behavior appropriate to situation.  Follows commands; facial expression symmetrical.  Purposeful motor response noted; normal sensation in all extremities. Pt denies headache; denies lightheadedness or dizziness; denies visual disturbances; denies loss of balance; denies unilateral weakness.

## 2024-07-08 NOTE — DISCHARGE INSTRUCTIONS
Please keep the area covered for the next week.  Undertake daily dressing changes and apply bacitracin ointment at that time.  A prescription for bacitracin was given to you.    Once the skin starts to dry and regrowth bandages can be discontinued.    If you develop spreading redness, increased pain, uncontrolled bleeding, purulent/white discharge, or any other new, worsening worrisome symptoms please return emergency department for re-evaluation.    Otherwise for any continued minor symptoms please follow-up with your primary care doctor within 1 week.

## 2024-07-09 ENCOUNTER — TELEPHONE (OUTPATIENT)
Dept: INTERNAL MEDICINE | Facility: CLINIC | Age: 39
End: 2024-07-09
Payer: COMMERCIAL

## 2024-07-09 NOTE — TELEPHONE ENCOUNTER
----- Message from Alissa Lacey MA sent at 7/9/2024  8:21 AM CDT -----    ----- Message -----  From: Radha Ramirez  Sent: 7/9/2024   8:17 AM CDT  To: [facility] Staff    Type:   Appointment Request      Name of Caller:pt portal  When is the first available appointment?n/a  Symptoms:Follow up from ER visit.  Best Call Back Number: [phone]  Additional Information: With Provider: Mariah Livingston [[facility]]    Preferred Date Range: 7/15/2024 - 7/22/2024    Preferred Times: Any Time

## 2024-07-09 NOTE — TELEPHONE ENCOUNTER
I scheduled her for 7-11-24 at 10:30. Please inform her and let me know if she cannot make the appt

## 2024-07-11 ENCOUNTER — OFFICE VISIT (OUTPATIENT)
Dept: INTERNAL MEDICINE | Facility: CLINIC | Age: 39
End: 2024-07-11
Payer: COMMERCIAL

## 2024-07-11 DIAGNOSIS — S66.829D: Primary | ICD-10-CM

## 2024-07-11 PROCEDURE — 3008F BODY MASS INDEX DOCD: CPT | Mod: CPTII,S$GLB,, | Performed by: INTERNAL MEDICINE

## 2024-07-11 PROCEDURE — 99213 OFFICE O/P EST LOW 20 MIN: CPT | Mod: S$GLB,,, | Performed by: INTERNAL MEDICINE

## 2024-07-11 PROCEDURE — 3078F DIAST BP <80 MM HG: CPT | Mod: CPTII,S$GLB,, | Performed by: INTERNAL MEDICINE

## 2024-07-11 PROCEDURE — 99999 PR PBB SHADOW E&M-EST. PATIENT-LVL V: CPT | Mod: PBBFAC,,, | Performed by: INTERNAL MEDICINE

## 2024-07-11 PROCEDURE — 3074F SYST BP LT 130 MM HG: CPT | Mod: CPTII,S$GLB,, | Performed by: INTERNAL MEDICINE

## 2024-07-11 RX ORDER — ACETAMINOPHEN AND CODEINE PHOSPHATE 300; 30 MG/1; MG/1
1 TABLET ORAL EVERY 4 HOURS PRN
Qty: 28 TABLET | Refills: 0 | Status: SHIPPED | OUTPATIENT
Start: 2024-07-11 | End: 2024-07-14 | Stop reason: ALTCHOICE

## 2024-07-12 ENCOUNTER — E-VISIT (OUTPATIENT)
Dept: INTERNAL MEDICINE | Facility: CLINIC | Age: 39
End: 2024-07-12
Payer: COMMERCIAL

## 2024-07-12 DIAGNOSIS — M79.646 THUMB PAIN, UNSPECIFIED LATERALITY: Primary | ICD-10-CM

## 2024-07-13 ENCOUNTER — HOSPITAL ENCOUNTER (EMERGENCY)
Facility: HOSPITAL | Age: 39
Discharge: HOME | End: 2024-07-14
Attending: EMERGENCY MEDICINE
Payer: COMMERCIAL

## 2024-07-13 DIAGNOSIS — S61.012D LACERATION OF LEFT THUMB WITHOUT FOREIGN BODY WITHOUT DAMAGE TO NAIL, SUBSEQUENT ENCOUNTER: Primary | ICD-10-CM

## 2024-07-13 DIAGNOSIS — M79.89 HAND SWELLING: ICD-10-CM

## 2024-07-13 PROCEDURE — 99283 EMERGENCY DEPT VISIT LOW MDM: CPT

## 2024-07-13 RX ORDER — ACETAMINOPHEN 500 MG
1000 TABLET ORAL
Status: COMPLETED | OUTPATIENT
Start: 2024-07-14 | End: 2024-07-14

## 2024-07-14 VITALS
SYSTOLIC BLOOD PRESSURE: 114 MMHG | DIASTOLIC BLOOD PRESSURE: 72 MMHG | OXYGEN SATURATION: 96 % | HEART RATE: 87 BPM | TEMPERATURE: 99 F | WEIGHT: 209 LBS | BODY MASS INDEX: 33.59 KG/M2 | HEIGHT: 66 IN

## 2024-07-14 VITALS
TEMPERATURE: 98 F | DIASTOLIC BLOOD PRESSURE: 80 MMHG | HEART RATE: 70 BPM | SYSTOLIC BLOOD PRESSURE: 126 MMHG | BODY MASS INDEX: 33.59 KG/M2 | RESPIRATION RATE: 17 BRPM | OXYGEN SATURATION: 99 % | WEIGHT: 209 LBS | HEIGHT: 66 IN

## 2024-07-14 PROCEDURE — 25000003 PHARM REV CODE 250: Performed by: EMERGENCY MEDICINE

## 2024-07-14 RX ORDER — BACITRACIN ZINC 500 [USP'U]/G
1 OINTMENT TOPICAL
Status: COMPLETED | OUTPATIENT
Start: 2024-07-14 | End: 2024-07-14

## 2024-07-14 RX ORDER — OXYCODONE HYDROCHLORIDE 5 MG/1
5 TABLET ORAL EVERY 4 HOURS PRN
Qty: 5 TABLET | Refills: 0 | Status: SHIPPED | OUTPATIENT
Start: 2024-07-14 | End: 2024-07-16

## 2024-07-14 RX ORDER — OXYCODONE HYDROCHLORIDE 5 MG/1
5 TABLET ORAL EVERY 4 HOURS PRN
Qty: 5 TABLET | Refills: 0 | OUTPATIENT
Start: 2024-07-14 | End: 2024-07-14

## 2024-07-14 RX ADMIN — BACITRACIN 1 EACH: 500 OINTMENT TOPICAL at 01:07

## 2024-07-14 RX ADMIN — Medication: at 12:07

## 2024-07-14 RX ADMIN — ACETAMINOPHEN 1000 MG: 500 TABLET ORAL at 12:07

## 2024-07-14 NOTE — ED PROVIDER NOTES
"Encounter Date: 2024       History     Chief Complaint   Patient presents with    Hand Pain     Pt was seen in ER on  for thumb laceration and says " my hand is swelling"     LARISSA Lynch is a 38-year-old female with past medical history endometriosis who presents with complaint of left thumb pain and swelling.  She had a skin avulsion of that thumb on the  due to a coring device.  She was seen here in our emergency department at the time, it was washed out and is being allowed to heal by secondary intention.  She has been applying bacitracin and nonstick gauze.  She states she is just still having severe pain and noted some swelling in the right hand today.  No erythema or fever.  No discharge of pus noted. No other complaints.    Review of patient's allergies indicates:   Allergen Reactions    Broccoli flower Hives, Itching and Swelling    Mold Hives, Itching and Swelling    Mustard Hives, Itching and Swelling    Shellfish containing products Hives, Itching and Swelling    Soy Hives, Itching and Swelling    Strawberry Hives, Itching and Swelling    Keflex [cephalexin] Hives and Rash     Past Medical History:   Diagnosis Date    Abnormal Pap smear of cervix     cryo yrs. ago, then 3- hpv positive, pap normal, 2017 colpo ECC normal,     Endometriosis     per dx lap Bad endo per pt    HPV (human papilloma virus) infection     Infertility, female         Oral contraceptive use     PID (pelvic inflammatory disease)      Past Surgical History:   Procedure Laterality Date     SECTION      COLONOSCOPY N/A 2017    Procedure: COLONOSCOPY;  Surgeon: Jose Zaidi MD;  Location: [address]);  Service: Endoscopy;  Laterality: N/A;    fallopian tube removal Bilateral     GYNECOLOGIC CRYOSURGERY      HYSTEROSCOPY      2015    In Vitro      Dr Anuja Santana    INJECTION OF BOTULINUM TOXIN TYPE A N/A 2022    Procedure: INJECTION, BOTULINUM TOXIN, TYPE A;  Surgeon: " RADHA Bahena MD;  Location: [facility] OR [address] [place] [address];  Service: Colon and Rectal;  Laterality: N/A;    PELVIC EXAMINATION UNDER ANESTHESIA N/A 12/7/2022    Procedure: EXAM UNDER ANESTHESIA, PELVIS;  Surgeon: RADHA Bahena MD;  [facility];  Service: Colon and Rectal;  Laterality: N/A;     Family History   Problem Relation Name Age of Onset    Heart disease Father      No Known Problems Mother      No Known Problems Daughter      Meningitis Brother      Breast cancer Neg Hx      Colon cancer Neg Hx      Ovarian cancer Neg Hx      Cancer Neg Hx      Colon polyps Neg Hx      Celiac disease Neg Hx      Esophageal cancer Neg Hx      Stomach cancer Neg Hx      Irritable bowel syndrome Neg Hx      Inflammatory bowel disease Neg Hx       Social History     Tobacco Use    Smoking status: Never    Smokeless tobacco: Never   Substance Use Topics    Alcohol use: Yes     Comment: occasional    Drug use: No     Review of Systems    Physical Exam     Initial Vitals [07/13/24 2154]   BP Pulse Resp Temp SpO2   108/68 78 17 98.2 °F (36.8 °C) 100 %      MAP       --         Physical Exam  General: Awake and alert, well-nourished  HENT: moist mucous membranes  Eyes: No conjunctival injection  Pulm: CTAB, no increased work of breathing  CV: Regular rate and rhythm, no murmur noted  Abdomen: Nondistended, non-tender to palpation  MSK: L thumb skin avulsion site appears clean, beefy red healing tissue, no surrounding erythema, minimal surrounding swelling.  Normal flexion and extension of thumb PIP, no tenderness along the flexor tendon.  Skin: No rash noted  Neuro: No facial asymmetry, grossly normal movements of arms and legs  Psychiatric: Cooperative    ED Course   Procedures  Labs Reviewed - No data to display         Imaging Results    None          Medications   acetaminophen tablet 1,000 mg (1,000 mg Oral Given 7/14/24 0013)   LETS (LIDOcaine-TETRAcaine-EPINEPHrine) gel solution ( Topical (Top) Given 7/14/24  0017)   bacitracin zinc ointment 1 each (1 each Topical (Top) Given 7/14/24 0132)     Medical Decision Making  Patient overall well-appearing.  She was given Tylenol and LET gel.  Exam after LET gel is benign, looks like it is healing well and no significant signs of cellulitis or tenosynovitis.  Placed bacitracin and vaseline gauze dressing.  Placed referral to wound care.  Return precautions given.    Risk  OTC drugs.  Prescription drug management.                                      Clinical Impression:  Final diagnoses:  [S61.012D] Laceration of left thumb without foreign body without damage to nail, subsequent encounter (Primary)  [M79.89] Hand swelling          ED Disposition Condition    Discharge Stable          ED Prescriptions       Medication Sig Dispense Start Date End Date Auth. Provider    oxyCODONE (ROXICODONE) 5 MG immediate release tablet  (Status: Discontinued) Take 1 tablet (5 mg total) by mouth every 4 (four) hours as needed for Pain. 5 tablet 7/14/2024 7/14/2024 Brennen Aguilar MD    oxyCODONE (ROXICODONE) 5 MG immediate release tablet Take 1 tablet (5 mg total) by mouth every 4 (four) hours as needed for Pain. 5 tablet 7/14/2024 7/16/2024 Brennen Aguilar MD          Follow-up Information       Follow up With Specialties Details Why Contact Info    Mariah Livingston MD Internal Medicine  As needed [address]  [place] [address]  [phone]               Brennen Aguilar MD  07/15/24 7045

## 2024-07-14 NOTE — PROGRESS NOTES
Subjective:       Patient ID: Syed Lyon is a 38 y.o. female.    Chief Complaint: Follow-up    HPI  She is here for emergency room follow-up.  Please see emergency department records     Past medical history: Iron-deficiency anemia     Medications: Birth control pills     Allergies: Keflex       Review of Systems   Constitutional:  Negative for chills, fatigue, fever and unexpected weight change.   Respiratory:  Negative for chest tightness and shortness of breath.    Cardiovascular:  Negative for chest pain and palpitations.   Gastrointestinal:  Negative for abdominal pain and blood in stool.   Neurological:  Negative for dizziness, syncope, numbness and headaches.       Objective:      Physical Exam  HENT:      Right Ear: External ear normal.      Left Ear: External ear normal.      Nose: Nose normal.      Mouth/Throat:      Mouth: Mucous membranes are moist.      Pharynx: Oropharynx is clear.   Eyes:      Pupils: Pupils are equal, round, and reactive to light.   Cardiovascular:      Rate and Rhythm: Normal rate and regular rhythm.      Heart sounds: No murmur heard.  Pulmonary:      Breath sounds: Normal breath sounds.   Abdominal:      General: There is no distension.      Palpations: There is no hepatomegaly or splenomegaly.      Tenderness: There is no abdominal tenderness.   Musculoskeletal:      Cervical back: Normal range of motion.   Lymphadenopathy:      Cervical: No cervical adenopathy.      Upper Body:      Right upper body: No axillary adenopathy.      Left upper body: No axillary adenopathy.   Neurological:      Cranial Nerves: No cranial nerve deficit.      Sensory: No sensory deficit.      Motor: Motor function is intact.      Deep Tendon Reflexes: Reflexes are normal and symmetric.       Left forearm laceration without redness or tenderness, no discharge  Assessment/Plan       Assessment and plan: Laceration: Continue topical treatment.  She was here for urgent care only appointment.  She will  return to clinic for a physical

## 2024-07-14 NOTE — DISCHARGE INSTRUCTIONS
Do dressing changes twice a day and clean it with at least water once a day.  For dressing changes apply the bacitracin and then apply Vaseline gauze over top.  You can put regular gauze over top of that.  Return to the emergency department if there is severely increasing pain, significantly increasing swelling, spreading redness, discharge of pus, fever or other new concerning symptoms.

## 2024-07-17 ENCOUNTER — HOSPITAL ENCOUNTER (OUTPATIENT)
Dept: WOUND CARE | Facility: HOSPITAL | Age: 39
Discharge: HOME | End: 2024-07-17
Attending: SURGERY
Payer: COMMERCIAL

## 2024-07-17 VITALS
DIASTOLIC BLOOD PRESSURE: 56 MMHG | WEIGHT: 208.56 LBS | SYSTOLIC BLOOD PRESSURE: 120 MMHG | TEMPERATURE: 99 F | HEART RATE: 81 BPM | BODY MASS INDEX: 33.52 KG/M2 | HEIGHT: 66 IN

## 2024-07-17 DIAGNOSIS — S61.012D LACERATION OF LEFT THUMB WITHOUT FOREIGN BODY WITHOUT DAMAGE TO NAIL, SUBSEQUENT ENCOUNTER: ICD-10-CM

## 2024-07-17 PROBLEM — S61.012A LACERATION OF LEFT THUMB WITHOUT FOREIGN BODY WITHOUT DAMAGE TO NAIL: Status: ACTIVE | Noted: 2024-07-17

## 2024-07-17 PROCEDURE — 99213 OFFICE O/P EST LOW 20 MIN: CPT | Mod: 25

## 2024-07-17 PROCEDURE — 17250 CHEM CAUT OF GRANLTJ TISSUE: CPT

## 2024-07-17 NOTE — PROGRESS NOTES
Wound Care & Hyperbaric Medicine    Subjective:       Patient ID: Syed Lyon is a 38 y.o. female.    Chief Complaint: Non-healing Wound    Admit to wound care with left thumb wound. Reports cutting thumb with apple slicer 2 weeks ago. Apply Bacitracin Ointment and Vaseline gauze 2 x day. Wound hyper granulated and wet upon dressing removal. Denies fever or signs of infection. No loss of range of motion. 2+ pulse left hand.  Reports pain 4/10. Silver Nitrate x 1 applied hypergranulation tissue. Plan of care initiated reassess 1 week.     Review of Systems   Constitutional: Negative.    HENT: Negative.     Eyes: Negative.    Respiratory: Negative.     Cardiovascular: Negative.    Gastrointestinal: Negative.    Genitourinary: Negative.    Musculoskeletal: Negative.    Skin: Negative.    Neurological: Negative.    Psychiatric/Behavioral: Negative.         Objective:     Vitals:    07/17/24 0908   BP: (!) 120/56   Pulse: 81   Temp: 98.6 °F (37 °C)         Physical Exam  Constitutional:       Appearance: She is well-developed.   HENT:      Head: Normocephalic.   Eyes:      Conjunctiva/sclera: Conjunctivae normal.      Pupils: Pupils are equal, round, and reactive to light.   Cardiovascular:      Rate and Rhythm: Normal rate and regular rhythm.      Heart sounds: Normal heart sounds.   Pulmonary:      Effort: Pulmonary effort is normal.      Breath sounds: Normal breath sounds.   Abdominal:      General: Bowel sounds are normal.      Palpations: Abdomen is soft.   Musculoskeletal:         General: Normal range of motion.      Cervical back: Normal range of motion and neck supple.   Skin:     General: Skin is warm and dry.   Neurological:      Mental Status: She is alert and oriented to person, place, and time.      Deep Tendon Reflexes: Reflexes are normal and symmetric.        Wound 07/17/24 0855 Traumatic Left medial Thumb (Active)   07/17/24 0855   Present on Original Admission: Y   Primary  Wound Type: Traumatic   Side: Left   Orientation: medial   Location: Thumb   Wound Approximate Age at First Assessment (Weeks):    Wound Number:    Is this injury device related?:    Incision Type:    Closure Method:    Wound Description (Comments):    Type:    Additional Comments:    Ankle-Brachial Index:    Pulses:    Removal Indication and Assessment:    Wound Outcome:    Wound Image   07/17/24 0911   Dressing Appearance Intact;Moist drainage 07/17/24 0911   Drainage Amount Moderate 07/17/24 0911   Drainage Characteristics/Odor Serous 07/17/24 0911   Appearance Red;Moist;Hypergranulation 07/17/24 0911   Tissue loss description Full thickness 07/17/24 0911   Red (%), Wound Tissue Color 100 % 07/17/24 0911   Periwound Area Moist;Macerated 07/17/24 0911   Wound Edges Open;Irregular 07/17/24 0911   Wound Length (cm) 1.3 cm 07/17/24 0911   Wound Width (cm) 1.2 cm 07/17/24 0911   Wound Depth (cm) 0.2 cm 07/17/24 0911   Wound Volume (cm^3) 0.312 cm^3 07/17/24 0911   Wound Surface Area (cm^2) 1.56 cm^2 07/17/24 0911   Care Cleansed with:;Sterile normal saline 07/17/24 0911   Dressing Applied;Island/border;Hydrofiber;Silver 07/17/24 0911   Periwound Care Absorptive dressing applied;Dry periwound area maintained 07/17/24 0911   Dressing Change Due 07/19/24 07/17/24 0911         Assessment/Plan:         ICD-10-CM ICD-9-CM   1. Laceration of left thumb without foreign body without damage to nail, subsequent encounter  S61.012D V58.89     883.0           Tissue pathology and/or culture taken:  [] Yes [x] No   Sharp debridement performed:   [] Yes [x] No   Labs ordered this visit:   [] Yes [x] No   Imaging ordered this visit:   [] Yes [x] No           Orders Placed This Encounter   Procedures    Ambulatory referral/consult to Wound Clinic     Standing Status:   Standing     Number of Occurrences:   1     Referral Priority:   Routine     Referral Type:   Consultation     Referral Reason:   Specialty Services Required      Requested Specialty:   Wound Care     Number of Visits Requested:   1    Change dressing     Left Thumb   Cleanse wound with:Normal Saline  Lidocaine:PRN  Silver nitrate:PRN  Periwound care:Maintain dry angelito wound  Primary dressing:Thin layer Bactroban Ointment, Hydrofera Ready   Secondary dressing:Conform, mefix tape   Frequency:Every other Day and Prn     Follow-up:1 week      Other Orders:Rx given for Keflex PO  and Bactroban Ointment 7/17/24        Follow up in about 1 week (around 7/24/2024) for .            This includes face to face time and non-face to face time preparing to see the patient (eg, review of tests), obtaining and/or reviewing separately obtained history, documenting clinical information in the electronic or other health record, independently interpreting results and communicating results to the patient/family/caregiver, or care coordinator.

## 2024-07-19 ENCOUNTER — PATIENT MESSAGE (OUTPATIENT)
Dept: ADMINISTRATIVE | Facility: OTHER | Age: 39
End: 2024-07-19
Payer: COMMERCIAL

## 2024-07-19 ENCOUNTER — PATIENT OUTREACH (OUTPATIENT)
Dept: EMERGENCY MEDICINE | Facility: HOSPITAL | Age: 39
End: 2024-07-19
Payer: COMMERCIAL

## 2024-07-24 ENCOUNTER — HOSPITAL ENCOUNTER (OUTPATIENT)
Dept: WOUND CARE | Facility: HOSPITAL | Age: 39
Discharge: HOME | End: 2024-07-24
Attending: SURGERY
Payer: COMMERCIAL

## 2024-07-24 VITALS
HEART RATE: 79 BPM | WEIGHT: 208 LBS | TEMPERATURE: 98 F | BODY MASS INDEX: 33.43 KG/M2 | DIASTOLIC BLOOD PRESSURE: 66 MMHG | SYSTOLIC BLOOD PRESSURE: 114 MMHG | HEIGHT: 66 IN

## 2024-07-24 DIAGNOSIS — R10.30 LOWER ABDOMINAL PAIN: ICD-10-CM

## 2024-07-24 DIAGNOSIS — N94.9 FEMALE GENITAL SYMPTOMS: ICD-10-CM

## 2024-07-24 DIAGNOSIS — O21.0 HYPEREMESIS GRAVIDARUM: ICD-10-CM

## 2024-07-24 DIAGNOSIS — S61.012D LACERATION OF LEFT THUMB WITHOUT FOREIGN BODY WITHOUT DAMAGE TO NAIL, SUBSEQUENT ENCOUNTER: Primary | ICD-10-CM

## 2024-07-24 PROCEDURE — 11042 DBRDMT SUBQ TIS 1ST 20SQCM/<: CPT

## 2024-07-24 NOTE — PROGRESS NOTES
Wound Care & Hyperbaric Medicine    Subjective:       Patient ID: Syed Lyon is a 38 y.o. female.    Chief Complaint: Non-healing Wound Follow Up    Follow up left thumb wound. Wound hemanth,debrided tolerated well continued plan of care.   Review of Systems   Constitutional: Negative.    HENT: Negative.     Eyes: Negative.    Respiratory: Negative.     Cardiovascular: Negative.    Gastrointestinal: Negative.    Genitourinary: Negative.    Musculoskeletal: Negative.    Skin: Negative.    Neurological: Negative.    Psychiatric/Behavioral: Negative.         Objective:     Vitals:    07/24/24 1103   BP: 114/66   Pulse: 79   Temp: 98.3 °F (36.8 °C)         Physical Exam  Constitutional:       Appearance: She is well-developed.   HENT:      Head: Normocephalic.   Eyes:      Conjunctiva/sclera: Conjunctivae normal.      Pupils: Pupils are equal, round, and reactive to light.   Cardiovascular:      Rate and Rhythm: Normal rate and regular rhythm.      Heart sounds: Normal heart sounds.   Pulmonary:      Effort: Pulmonary effort is normal.      Breath sounds: Normal breath sounds.   Abdominal:      General: Bowel sounds are normal.      Palpations: Abdomen is soft.   Musculoskeletal:         General: Normal range of motion.      Cervical back: Normal range of motion and neck supple.   Skin:     General: Skin is warm and dry.   Neurological:      Mental Status: She is alert and oriented to person, place, and time.      Deep Tendon Reflexes: Reflexes are normal and symmetric.        Wound 07/17/24 0855 Traumatic Left medial Thumb (Active)   07/17/24 0855   Present on Original Admission: Y   Primary Wound Type: Traumatic   Side: Left   Orientation: medial   Location: Thumb   Wound Approximate Age at First Assessment (Weeks):    Wound Number:    Is this injury device related?:    Incision Type:    Closure Method:    Wound Description (Comments):    Type:    Additional Comments:    Ankle-Brachial  Index:    Pulses:    Removal Indication and Assessment:    Wound Outcome:    Wound Image    07/24/24 1109   Dressing Appearance Intact;Dry 07/24/24 1109   Drainage Amount None 07/24/24 1109   Appearance Dry;Other (see comments) 07/24/24 1109   Periwound Area Intact;Dry 07/24/24 1109   Wound Edges Undefined 07/24/24 1109   Wound Length (cm) 1.2 cm 07/24/24 1109   Wound Width (cm) 1 cm 07/24/24 1109   Wound Depth (cm) 0.2 cm 07/24/24 1109   Wound Volume (cm^3) 0.24 cm^3 07/24/24 1109   Wound Surface Area (cm^2) 1.2 cm^2 07/24/24 1109   Care Cleansed with:;Sterile normal saline;Debrided 07/24/24 1109   Dressing Applied;Hydrofiber;Rolled gauze 07/24/24 1109   Periwound Care Dry periwound area maintained;Absorptive dressing applied 07/24/24 1109   Dressing Change Due 07/26/24 07/24/24 1109         Assessment/Plan:         ICD-10-CM ICD-9-CM   1. Laceration of left thumb without foreign body without damage to nail, subsequent encounter  S61.012D V58.89     883.0           Tissue pathology and/or culture taken:  [] Yes [x] No   Sharp debridement performed:   [x] Yes [] No   Labs ordered this visit:   [] Yes [x] No   Imaging ordered this visit:   [] Yes [x] No           Orders Placed This Encounter   Procedures    Change dressing     Left Thumb   Cleanse wound with:Normal Saline   Lidocaine:PRN   Silver nitrate:PRN   Periwound care:Maintain dry angelito wound   Primary dressing:Thin layer Bactroban Ointment, Hydrofera Ready   Secondary dressing:Conform, mefix tape   Frequency:Every other Day and Prn     Follow-up:1 week    Other Orders:Continue Keflex PO until finished        Follow up in about 2 weeks (around 8/7/2024) for  .            This includes face to face time and non-face to face time preparing to see the patient (eg, review of tests), obtaining and/or reviewing separately obtained history, documenting clinical information in the electronic or other health record, independently interpreting  results and communicating results to the patient/family/caregiver, or care coordinator.

## 2024-07-24 NOTE — PROCEDURES
"Debridement    Date/Time: 7/24/2024 10:56 AM    Performed by: Jaida Messina MD  Authorized by: Jaida Messina MD    Time out: Immediately prior to procedure a "time out" was called to verify the correct patient, procedure, equipment, support staff and site/side marked as required.    Consent Done?:  Yes (Verbal)  Local anesthesia used?: Yes    Local anesthetic:  Topical anesthetic    Wound Details:    Location:  Left hand    Type of Debridement:  Excisional       Length (cm):  1.2       Area (sq cm):  1.2       Width (cm):  1       Percent Debrided (%):  100       Depth (cm):  0.2       Total Area Debrided (sq cm):  1.2    Depth of debridement:  Subcutaneous tissue    Tissue debrided:  Subcutaneous    Devitalized tissue debrided:  Biofilm and Slough    Instruments:  Forceps and Scissors  Bleeding:  None  Patient tolerance:  Patient tolerated the procedure well with no immediate complications  1st Wound Pain Assessment: 2  "

## 2024-08-07 ENCOUNTER — HOSPITAL ENCOUNTER (OUTPATIENT)
Dept: WOUND CARE | Facility: HOSPITAL | Age: 39
Discharge: HOME | End: 2024-08-07
Attending: SURGERY
Payer: COMMERCIAL

## 2024-08-07 VITALS
BODY MASS INDEX: 33.41 KG/M2 | HEIGHT: 66 IN | HEART RATE: 83 BPM | DIASTOLIC BLOOD PRESSURE: 65 MMHG | TEMPERATURE: 99 F | SYSTOLIC BLOOD PRESSURE: 116 MMHG | WEIGHT: 207.88 LBS

## 2024-08-07 DIAGNOSIS — N94.9 FEMALE GENITAL SYMPTOMS: ICD-10-CM

## 2024-08-07 DIAGNOSIS — O21.0 HYPEREMESIS AFFECTING PREGNANCY, ANTEPARTUM: ICD-10-CM

## 2024-08-07 DIAGNOSIS — S61.012D LACERATION OF LEFT THUMB WITHOUT FOREIGN BODY WITHOUT DAMAGE TO NAIL, SUBSEQUENT ENCOUNTER: Primary | ICD-10-CM

## 2024-08-07 PROCEDURE — 97597 DBRDMT OPN WND 1ST 20 CM/<: CPT

## 2024-08-08 ENCOUNTER — TELEPHONE (OUTPATIENT)
Dept: SPORTS MEDICINE | Facility: CLINIC | Age: 39
End: 2024-08-08
Payer: COMMERCIAL

## 2024-08-09 ENCOUNTER — HOSPITAL ENCOUNTER (OUTPATIENT)
Dept: RADIOLOGY | Facility: HOSPITAL | Age: 39
Discharge: HOME | End: 2024-08-09
Attending: INTERNAL MEDICINE
Payer: COMMERCIAL

## 2024-08-09 ENCOUNTER — OFFICE VISIT (OUTPATIENT)
Dept: INTERNAL MEDICINE | Facility: CLINIC | Age: 39
End: 2024-08-09
Payer: COMMERCIAL

## 2024-08-09 ENCOUNTER — PATIENT MESSAGE (OUTPATIENT)
Dept: ADMINISTRATIVE | Facility: OTHER | Age: 39
End: 2024-08-09
Payer: COMMERCIAL

## 2024-08-09 DIAGNOSIS — L98.9 SKIN LESION: ICD-10-CM

## 2024-08-09 DIAGNOSIS — D64.9 ANEMIA, UNSPECIFIED TYPE: ICD-10-CM

## 2024-08-09 DIAGNOSIS — Z00.00 PREVENTATIVE HEALTH CARE: ICD-10-CM

## 2024-08-09 DIAGNOSIS — Z01.419 WELL WOMAN EXAM: Primary | ICD-10-CM

## 2024-08-09 DIAGNOSIS — M25.529 ELBOW PAIN, UNSPECIFIED LATERALITY: ICD-10-CM

## 2024-08-09 PROCEDURE — 73080 X-RAY EXAM OF ELBOW: CPT | Mod: 26,RT,, | Performed by: RADIOLOGY

## 2024-08-09 PROCEDURE — 3074F SYST BP LT 130 MM HG: CPT | Mod: CPTII,S$GLB,, | Performed by: INTERNAL MEDICINE

## 2024-08-09 PROCEDURE — 3008F BODY MASS INDEX DOCD: CPT | Mod: CPTII,S$GLB,, | Performed by: INTERNAL MEDICINE

## 2024-08-09 PROCEDURE — 3078F DIAST BP <80 MM HG: CPT | Mod: CPTII,S$GLB,, | Performed by: INTERNAL MEDICINE

## 2024-08-09 PROCEDURE — 99999 PR PBB SHADOW E&M-EST. PATIENT-LVL V: CPT | Mod: PBBFAC,,, | Performed by: INTERNAL MEDICINE

## 2024-08-09 PROCEDURE — 1159F MED LIST DOCD IN RCRD: CPT | Mod: CPTII,S$GLB,, | Performed by: INTERNAL MEDICINE

## 2024-08-09 PROCEDURE — 99395 PREV VISIT EST AGE 18-39: CPT | Mod: S$GLB,,, | Performed by: INTERNAL MEDICINE

## 2024-08-09 PROCEDURE — 73080 X-RAY EXAM OF ELBOW: CPT | Mod: TC,RT

## 2024-08-09 RX ORDER — ESTRADIOL 2 MG/1
TABLET ORAL
COMMUNITY
Start: 2024-08-08

## 2024-08-12 ENCOUNTER — LAB VISIT (OUTPATIENT)
Dept: LAB | Facility: HOSPITAL | Age: 39
End: 2024-08-12
Attending: INTERNAL MEDICINE
Payer: COMMERCIAL

## 2024-08-12 DIAGNOSIS — D64.9 ANEMIA, UNSPECIFIED TYPE: ICD-10-CM

## 2024-08-12 LAB
ALBUMIN SERPL BCP-MCNC: 3.6 G/DL (ref 3.5–5.2)
ALP SERPL-CCNC: 48 U/L (ref 55–135)
ALT SERPL W/O P-5'-P-CCNC: 13 U/L (ref 10–44)
ANION GAP SERPL CALC-SCNC: 8 MMOL/L (ref 8–16)
AST SERPL-CCNC: 17 U/L (ref 10–40)
BASOPHILS # BLD AUTO: 0.02 K/UL (ref 0–0.2)
BASOPHILS NFR BLD: 0.4 % (ref 0–1.9)
BILIRUB SERPL-MCNC: 0.5 MG/DL (ref 0.1–1)
BUN SERPL-MCNC: 14 MG/DL (ref 6–20)
CALCIUM SERPL-MCNC: 9.6 MG/DL (ref 8.7–10.5)
CHLORIDE SERPL-SCNC: 108 MMOL/L (ref 95–110)
CHOLEST SERPL-MCNC: 161 MG/DL (ref 120–199)
CHOLEST/HDLC SERPL: 2.6 {RATIO} (ref 2–5)
CO2 SERPL-SCNC: 23 MMOL/L (ref 23–29)
CREAT SERPL-MCNC: 0.8 MG/DL (ref 0.5–1.4)
DIFFERENTIAL METHOD BLD: ABNORMAL
EOSINOPHIL # BLD AUTO: 0.3 K/UL (ref 0–0.5)
EOSINOPHIL NFR BLD: 5 % (ref 0–8)
ERYTHROCYTE [DISTWIDTH] IN BLOOD BY AUTOMATED COUNT: 15.9 % (ref 11.5–14.5)
EST. GFR  (NO RACE VARIABLE): >60 ML/MIN/1.73 M^2
GLUCOSE SERPL-MCNC: 93 MG/DL (ref 70–110)
HCT VFR BLD AUTO: 37.6 % (ref 37–48.5)
HDLC SERPL-MCNC: 62 MG/DL (ref 40–75)
HDLC SERPL: 38.5 % (ref 20–50)
HGB BLD-MCNC: 11.5 G/DL (ref 12–16)
IMM GRANULOCYTES # BLD AUTO: 0.01 K/UL (ref 0–0.04)
IMM GRANULOCYTES NFR BLD AUTO: 0.2 % (ref 0–0.5)
LDLC SERPL CALC-MCNC: 81.4 MG/DL (ref 63–159)
LYMPHOCYTES # BLD AUTO: 1.5 K/UL (ref 1–4.8)
LYMPHOCYTES NFR BLD: 28.2 % (ref 18–48)
MCH RBC QN AUTO: 22.9 PG (ref 27–31)
MCHC RBC AUTO-ENTMCNC: 30.6 G/DL (ref 32–36)
MCV RBC AUTO: 75 FL (ref 82–98)
MONOCYTES # BLD AUTO: 0.6 K/UL (ref 0.3–1)
MONOCYTES NFR BLD: 11.1 % (ref 4–15)
NEUTROPHILS # BLD AUTO: 3 K/UL (ref 1.8–7.7)
NEUTROPHILS NFR BLD: 55.1 % (ref 38–73)
NONHDLC SERPL-MCNC: 99 MG/DL
NRBC BLD-RTO: 0 /100 WBC
PLATELET # BLD AUTO: 180 K/UL (ref 150–450)
PMV BLD AUTO: 12.2 FL (ref 9.2–12.9)
POTASSIUM SERPL-SCNC: 4.4 MMOL/L (ref 3.5–5.1)
PROT SERPL-MCNC: 6.9 G/DL (ref 6–8.4)
RBC # BLD AUTO: 5.03 M/UL (ref 4–5.4)
SODIUM SERPL-SCNC: 139 MMOL/L (ref 136–145)
TRIGL SERPL-MCNC: 88 MG/DL (ref 30–150)
TSH SERPL DL<=0.005 MIU/L-ACNC: 0.87 UIU/ML (ref 0.4–4)
WBC # BLD AUTO: 5.42 K/UL (ref 3.9–12.7)

## 2024-08-12 PROCEDURE — 80053 COMPREHEN METABOLIC PANEL: CPT | Performed by: INTERNAL MEDICINE

## 2024-08-12 PROCEDURE — 80061 LIPID PANEL: CPT | Performed by: INTERNAL MEDICINE

## 2024-08-12 PROCEDURE — 85025 COMPLETE CBC W/AUTO DIFF WBC: CPT | Performed by: INTERNAL MEDICINE

## 2024-08-12 PROCEDURE — 36415 COLL VENOUS BLD VENIPUNCTURE: CPT | Performed by: INTERNAL MEDICINE

## 2024-08-12 PROCEDURE — 84443 ASSAY THYROID STIM HORMONE: CPT | Performed by: INTERNAL MEDICINE

## 2024-08-14 VITALS
DIASTOLIC BLOOD PRESSURE: 64 MMHG | HEIGHT: 66 IN | TEMPERATURE: 99 F | SYSTOLIC BLOOD PRESSURE: 114 MMHG | BODY MASS INDEX: 34.22 KG/M2 | WEIGHT: 212.94 LBS | OXYGEN SATURATION: 98 % | HEART RATE: 96 BPM

## 2024-08-14 NOTE — PROGRESS NOTES
Subjective:       Patient ID: Syed Lyon is a 38 y.o. female.    Chief Complaint: Annual Exam    HPI  She is here for annual exam  Review of Systems   Constitutional:  Negative for chills, fatigue, fever and unexpected weight change.   Respiratory:  Negative for chest tightness and shortness of breath.    Cardiovascular:  Negative for chest pain and palpitations.   Gastrointestinal:  Negative for abdominal pain and blood in stool.   Neurological:  Negative for dizziness, syncope, numbness and headaches.       Objective:      Physical Exam  HENT:      Right Ear: External ear normal.      Left Ear: External ear normal.      Nose: Nose normal.      Mouth/Throat:      Mouth: Mucous membranes are moist.      Pharynx: Oropharynx is clear.   Eyes:      Pupils: Pupils are equal, round, and reactive to light.   Cardiovascular:      Rate and Rhythm: Normal rate and regular rhythm.      Heart sounds: No murmur heard.  Pulmonary:      Breath sounds: Normal breath sounds.   Abdominal:      General: There is no distension.      Palpations: There is no hepatomegaly or splenomegaly.      Tenderness: There is no abdominal tenderness.   Musculoskeletal:      Cervical back: Normal range of motion.   Lymphadenopathy:      Cervical: No cervical adenopathy.      Upper Body:      Right upper body: No axillary adenopathy.      Left upper body: No axillary adenopathy.   Neurological:      Cranial Nerves: No cranial nerve deficit.      Sensory: No sensory deficit.      Motor: Motor function is intact.      Deep Tendon Reflexes: Reflexes are normal and symmetric.         Assessment/Plan       Annual exam: Check CMP, lipid panel, CBC, TSH

## 2024-08-20 ENCOUNTER — TELEPHONE (OUTPATIENT)
Dept: SPORTS MEDICINE | Facility: CLINIC | Age: 39
End: 2024-08-20
Payer: COMMERCIAL

## 2024-08-20 NOTE — TELEPHONE ENCOUNTER
Spoke to the patient in regard to their 8/30 appt with Fatimah Moya PA-C. While on the call I informed the patient that her provider as a training to attend on 8/30 and offered an earlier appt. The patient accepted an appt on 8/30 at 8am as a virtual appt with Fatimah Moya PA-C.

## 2024-08-29 PROBLEM — S60.322A: Status: ACTIVE | Noted: 2024-08-29

## 2024-10-09 ENCOUNTER — HOSPITAL ENCOUNTER (EMERGENCY)
Facility: HOSPITAL | Age: 39
Discharge: HOME | End: 2024-10-09
Attending: EMERGENCY MEDICINE
Payer: COMMERCIAL

## 2024-10-09 VITALS
TEMPERATURE: 99 F | OXYGEN SATURATION: 100 % | HEIGHT: 66 IN | WEIGHT: 205 LBS | SYSTOLIC BLOOD PRESSURE: 133 MMHG | BODY MASS INDEX: 32.95 KG/M2 | DIASTOLIC BLOOD PRESSURE: 68 MMHG | HEART RATE: 77 BPM | RESPIRATION RATE: 16 BRPM

## 2024-10-09 DIAGNOSIS — S62.356A CLOSED NONDISPLACED FRACTURE OF SHAFT OF FIFTH METACARPAL BONE OF RIGHT HAND, INITIAL ENCOUNTER: Primary | ICD-10-CM

## 2024-10-09 PROCEDURE — 99283 EMERGENCY DEPT VISIT LOW MDM: CPT | Mod: 25

## 2024-10-09 PROCEDURE — 29125 APPL SHORT ARM SPLINT STATIC: CPT | Mod: RT
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
## 2024-10-09 NOTE — DISCHARGE INSTRUCTIONS
You presented to the emergency department with complaints of fracture in the right hand.  Your splint was removed and replaced.    You may take Tylenol for pain management no more than 2000 mg a day.  Please do splint wet.  Please refrain from heavy activity with a splinted hand.    Please follow-up in the clinic with orthopedics/hand surgery for further evaluation and management.  Please return back to the emergency department for symptoms begin to worsen.  
Home
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
## 2024-10-10 ENCOUNTER — TELEPHONE (OUTPATIENT)
Dept: ORTHOPEDICS | Facility: CLINIC | Age: 39
End: 2024-10-10
Payer: COMMERCIAL

## 2024-10-10 ENCOUNTER — PATIENT MESSAGE (OUTPATIENT)
Dept: ORTHOPEDICS | Facility: CLINIC | Age: 39
End: 2024-10-10
Payer: COMMERCIAL

## 2024-10-10 ENCOUNTER — OFFICE VISIT (OUTPATIENT)
Dept: ORTHOPEDICS | Facility: CLINIC | Age: 39
End: 2024-10-10
Payer: COMMERCIAL

## 2024-10-10 DIAGNOSIS — S62.356A CLOSED NONDISPLACED FRACTURE OF SHAFT OF FIFTH METACARPAL BONE OF RIGHT HAND, INITIAL ENCOUNTER: ICD-10-CM

## 2024-10-10 DIAGNOSIS — S62.306A UNSPECIFIED FRACTURE OF FIFTH METACARPAL BONE, RIGHT HAND, INITIAL ENCOUNTER FOR CLOSED FRACTURE: Primary | ICD-10-CM

## 2024-10-10 PROCEDURE — 99204 OFFICE O/P NEW MOD 45 MIN: CPT | Mod: S$GLB,,, | Performed by: ORTHOPAEDIC SURGERY

## 2024-10-10 PROCEDURE — 1159F MED LIST DOCD IN RCRD: CPT | Mod: CPTII,S$GLB,, | Performed by: ORTHOPAEDIC SURGERY

## 2024-10-10 PROCEDURE — 99999 PR PBB SHADOW E&M-EST. PATIENT-LVL III: CPT | Mod: PBBFAC,,, | Performed by: ORTHOPAEDIC SURGERY

## 2024-10-10 PROCEDURE — 1160F RVW MEDS BY RX/DR IN RCRD: CPT | Mod: CPTII,S$GLB,, | Performed by: ORTHOPAEDIC SURGERY

## 2024-10-10 RX ORDER — ACETAMINOPHEN 500 MG
500 TABLET ORAL EVERY 6 HOURS
Qty: 56 TABLET | Refills: 0 | Status: SHIPPED | OUTPATIENT
Start: 2024-10-10 | End: 2024-10-24

## 2024-10-10 RX ORDER — OXYCODONE HYDROCHLORIDE 5 MG/1
5 TABLET ORAL EVERY 8 HOURS PRN
Qty: 15 TABLET | Refills: 0 | Status: SHIPPED | OUTPATIENT
Start: 2024-10-10
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
## 2024-10-11 DIAGNOSIS — S62.306A UNSPECIFIED FRACTURE OF FIFTH METACARPAL BONE, RIGHT HAND, INITIAL ENCOUNTER FOR CLOSED FRACTURE: Primary | ICD-10-CM

## 2024-10-14 ENCOUNTER — OFFICE VISIT (OUTPATIENT)
Dept: ORTHOPEDICS | Facility: CLINIC | Age: 39
End: 2024-10-14
Payer: COMMERCIAL

## 2024-10-14 DIAGNOSIS — S62.326G CLOSED DISPLACED FRACTURE OF SHAFT OF FIFTH METACARPAL BONE OF RIGHT HAND WITH DELAYED HEALING, SUBSEQUENT ENCOUNTER: Primary | ICD-10-CM

## 2024-10-14 PROCEDURE — 99999 PR PBB SHADOW E&M-EST. PATIENT-LVL III: CPT | Mod: PBBFAC,,,

## 2024-10-14 PROCEDURE — 99204 OFFICE O/P NEW MOD 45 MIN: CPT | Mod: S$GLB,,,

## 2024-10-14 PROCEDURE — 1159F MED LIST DOCD IN RCRD: CPT | Mod: CPTII,S$GLB,,
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
## 2024-10-22 ENCOUNTER — PATIENT MESSAGE (OUTPATIENT)
Dept: ORTHOPEDICS | Facility: CLINIC | Age: 39
End: 2024-10-22
Payer: COMMERCIAL

## 2024-10-23 ENCOUNTER — OFFICE VISIT (OUTPATIENT)
Dept: ORTHOPEDICS | Facility: CLINIC | Age: 39
End: 2024-10-23
Payer: COMMERCIAL

## 2024-10-23 ENCOUNTER — HOSPITAL ENCOUNTER (OUTPATIENT)
Dept: RADIOLOGY | Facility: HOSPITAL | Age: 39
Discharge: HOME | End: 2024-10-23
Payer: COMMERCIAL

## 2024-10-23 DIAGNOSIS — M79.641 RIGHT HAND PAIN: ICD-10-CM

## 2024-10-23 DIAGNOSIS — M79.641 RIGHT HAND PAIN: Primary | ICD-10-CM

## 2024-10-23 PROCEDURE — 73130 X-RAY EXAM OF HAND: CPT | Mod: 26,RT,, | Performed by: RADIOLOGY

## 2024-10-23 PROCEDURE — 99999 PR PBB SHADOW E&M-EST. PATIENT-LVL III: CPT | Mod: PBBFAC,,,

## 2024-10-23 PROCEDURE — 73130 X-RAY EXAM OF HAND: CPT | Mod: TC,RT
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
## 2024-10-27 RX ORDER — DROSPIRENONE 4 MG/1
1 TABLET, FILM COATED ORAL
Qty: 28 TABLET | Refills: 11 | Status: SHIPPED | OUTPATIENT
Start: 2024-10-27

## 2024-10-29 PROBLEM — L91.0 KELOID: Status: ACTIVE | Noted: 2024-10-29
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
## 2024-11-06 ENCOUNTER — HOSPITAL ENCOUNTER (OUTPATIENT)
Dept: RADIOLOGY | Facility: HOSPITAL | Age: 39
Discharge: HOME | End: 2024-11-06
Payer: COMMERCIAL

## 2024-11-06 ENCOUNTER — OFFICE VISIT (OUTPATIENT)
Dept: ORTHOPEDICS | Facility: CLINIC | Age: 39
End: 2024-11-06
Payer: COMMERCIAL

## 2024-11-06 DIAGNOSIS — M79.641 RIGHT HAND PAIN: ICD-10-CM

## 2024-11-06 DIAGNOSIS — S62.326G CLOSED DISPLACED FRACTURE OF SHAFT OF FIFTH METACARPAL BONE OF RIGHT HAND WITH DELAYED HEALING, SUBSEQUENT ENCOUNTER: Primary | ICD-10-CM

## 2024-11-06 PROCEDURE — 73130 X-RAY EXAM OF HAND: CPT | Mod: 26,RT,, | Performed by: RADIOLOGY

## 2024-11-06 PROCEDURE — 99999 PR PBB SHADOW E&M-EST. PATIENT-LVL III: CPT | Mod: PBBFAC,,,

## 2024-11-06 PROCEDURE — 73130 X-RAY EXAM OF HAND: CPT | Mod: TC,RT

## 2024-11-06 RX ORDER — MELOXICAM 15 MG/1
15 TABLET ORAL DAILY
Qty: 30 TABLET | Refills: 0 | Status: SHIPPED | OUTPATIENT
Start: 2024-11-06

## 2024-11-08 DIAGNOSIS — M79.641 RIGHT HAND PAIN: Primary | ICD-10-CM

## 2024-11-25 ENCOUNTER — HOSPITAL ENCOUNTER (OUTPATIENT)
Dept: RADIOLOGY | Facility: OTHER | Age: 39
Discharge: HOME | End: 2024-11-25
Payer: COMMERCIAL

## 2024-11-25 ENCOUNTER — OFFICE VISIT (OUTPATIENT)
Dept: ORTHOPEDICS | Facility: CLINIC | Age: 39
End: 2024-11-25
Payer: COMMERCIAL

## 2024-11-25 DIAGNOSIS — M79.641 RIGHT HAND PAIN: ICD-10-CM

## 2024-11-25 DIAGNOSIS — S62.326G CLOSED DISPLACED FRACTURE OF SHAFT OF FIFTH METACARPAL BONE OF RIGHT HAND WITH DELAYED HEALING, SUBSEQUENT ENCOUNTER: Primary | ICD-10-CM

## 2024-11-25 PROCEDURE — 73130 X-RAY EXAM OF HAND: CPT | Mod: 26,RT,, | Performed by: RADIOLOGY

## 2024-11-25 PROCEDURE — 1159F MED LIST DOCD IN RCRD: CPT | Mod: CPTII,S$GLB,,

## 2024-11-25 PROCEDURE — 73130 X-RAY EXAM OF HAND: CPT | Mod: TC,FY,RT

## 2024-11-25 PROCEDURE — 99214 OFFICE O/P EST MOD 30 MIN: CPT | Mod: S$GLB,,,

## 2024-11-25 PROCEDURE — 1160F RVW MEDS BY RX/DR IN RCRD: CPT | Mod: CPTII,S$GLB,,

## 2024-11-25 PROCEDURE — 99999 PR PBB SHADOW E&M-EST. PATIENT-LVL IV: CPT | Mod: PBBFAC,,,

## 2024-11-25 RX ORDER — TRAMADOL HYDROCHLORIDE 50 MG/1
50 TABLET ORAL EVERY 6 HOURS
Qty: 10 TABLET | Refills: 0 | Status: SHIPPED | OUTPATIENT
Start: 2024-11-25
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
## 2024-11-27 DIAGNOSIS — M79.641 RIGHT HAND PAIN: Primary | ICD-10-CM

## 2024-12-02 ENCOUNTER — OFFICE VISIT (OUTPATIENT)
Dept: DERMATOLOGY | Facility: CLINIC | Age: 39
End: 2024-12-02
Payer: COMMERCIAL

## 2024-12-02 DIAGNOSIS — L72.0 EPIDERMAL INCLUSION CYST: Primary | ICD-10-CM

## 2024-12-02 DIAGNOSIS — L70.0 ACNE VULGARIS: ICD-10-CM

## 2024-12-02 PROCEDURE — 99999 PR PBB SHADOW E&M-EST. PATIENT-LVL IV: CPT | Mod: PBBFAC,,, | Performed by: STUDENT IN AN ORGANIZED HEALTH CARE EDUCATION/TRAINING PROGRAM

## 2024-12-02 PROCEDURE — 1160F RVW MEDS BY RX/DR IN RCRD: CPT | Mod: CPTII,S$GLB,, | Performed by: STUDENT IN AN ORGANIZED HEALTH CARE EDUCATION/TRAINING PROGRAM

## 2024-12-02 PROCEDURE — 99204 OFFICE O/P NEW MOD 45 MIN: CPT | Mod: S$GLB,,, | Performed by: STUDENT IN AN ORGANIZED HEALTH CARE EDUCATION/TRAINING PROGRAM

## 2024-12-02 PROCEDURE — 1159F MED LIST DOCD IN RCRD: CPT | Mod: CPTII,S$GLB,, | Performed by: STUDENT IN AN ORGANIZED HEALTH CARE EDUCATION/TRAINING PROGRAM

## 2024-12-02 RX ORDER — TRETINOIN 0.25 MG/G
CREAM TOPICAL
Qty: 45 G | Refills: 2 | Status: SHIPPED | OUTPATIENT
Start: 2024-12-02

## 2024-12-02 NOTE — PROGRESS NOTES
Subjective:      Patient ID:  Syed Lyon is a 39 y.o. female who presents for No chief complaint on file.    Syed Lyon is a 39 y.o. female who presents for: evaluation of skin lesions.    New patient    The patient has the following lesions of concern:  Location: right thigh   Duration: years   Symptoms: not currently tender   Relieving factors/Previous treatments: none     Pertinent history:  History of blistering sunburns: No  History of tanning bed use: No  Family history of melanoma: No  Personal history of mole removal: No  Personal history of skin cancer: No      Review of Systems   Skin:  Negative for daily sunscreen use, activity-related sunscreen use and recent sunburn.   Hematologic/Lymphatic: Does not bruise/bleed easily.       Objective:   Physical Exam   Skin:   Areas Examined (abnormalities noted in diagram):   Head / Face Inspection Performed            Diagram Legend     Erythematous scaling macule/papule c/w actinic keratosis       Vascular papule c/w angioma      Pigmented verrucoid papule/plaque c/w seborrheic keratosis      Yellow umbilicated papule c/w sebaceous hyperplasia      Irregularly shaped tan macule c/w lentigo     1-2 mm smooth white papules consistent with Milia      Movable subcutaneous cyst with punctum c/w epidermal inclusion cyst      Subcutaneous movable cyst c/w pilar cyst      Firm pink to brown papule c/w dermatofibroma      Pedunculated fleshy papule(s) c/w skin tag(s)      Evenly pigmented macule c/w junctional nevus     Mildly variegated pigmented, slightly irregular-bordered macule c/w mildly atypical nevus      Flesh colored to evenly pigmented papule c/w intradermal nevus       Pink pearly papule/plaque c/w basal cell carcinoma      Erythematous hyperkeratotic cursted plaque c/w SCC      Surgical scar with no sign of skin cancer recurrence      Open and closed comedones      Inflammatory papules and pustules      Verrucoid papule consistent consistent with  wart     Erythematous eczematous patches and plaques     Dystrophic onycholytic nail with subungual debris c/w onychomycosis     Umbilicated papule    Erythematous-base heme-crusted tan verrucoid plaque consistent with inflamed seborrheic keratosis     Erythematous Silvery Scaling Plaque c/w Psoriasis     See annotation      Assessment / Plan:        Epidermal inclusion cyst  8 mm mobile subcutaneous papule central punctum  Reassurance benign  Offered punch excision- pt declines    Acne vulgaris  Combination skin (oily and dry) with comedonal acne of lower face  Pt currently using only neutrogena orange fash wash  Recommend skin care regimen as below    AM:  Sunscreen SPF 30+ mineral tinted daily (I.e. elta MD UV Elements, La Roche Posay mineral tinted, etc)    PM:  Wash face gentle cleanser (I.e. CeraVe, Cetaphil)  Start topical retinoid tretinoin 0.025% cream  Start topical retinoid every other night and increase to nightly as tolerated. Apply pea-sized amount to dry face at night. Possible side effects of topical retinoid including dryness, peeling/scaling, erythema, pruritus. If side effects occur, decrease frequency of use or use moisturizer before use. Always use moisturizer after use. May have initial worsening of acne and results take 2-3 months to see benefit, so consistent use is important. This medication is contraindicated in pregnancy.  Apply moisturizing cream after    -     tretinoin (RETIN-A) 0.025 % cream; Apply pea-sized amount to face nightly  Dispense: 45 g; Refill: 2    RTC prn if condition worsens or fails to improve

## 2024-12-02 NOTE — PATIENT INSTRUCTIONS
AM:  Sunscreen SPF 30+ mineral tinted daily (I.e. elta MD UV Elements, La Roche Posay mineral tinted, etc)    PM:  Wash face gentle cleanser (I.e. CeraVe, Cetaphil)  Start topical retinoid tretinoin 0.025% cream  Start topical retinoid every other night and increase to nightly as tolerated. Apply pea-sized amount to dry face at night. Possible side effects of topical retinoid including dryness, peeling/scaling, erythema, pruritus. If side effects occur, decrease frequency of use or use moisturizer before use. Always use moisturizer after use. May have initial worsening of acne and results take 2-3 months to see benefit, so consistent use is important. This medication is contraindicated in pregnancy.  Apply moisturizing cream after

## 2024-12-04 ENCOUNTER — CLINICAL SUPPORT (OUTPATIENT)
Dept: REHABILITATION | Facility: HOSPITAL | Age: 39
End: 2024-12-04
Payer: COMMERCIAL

## 2024-12-04 DIAGNOSIS — S62.326G CLOSED DISPLACED FRACTURE OF SHAFT OF FIFTH METACARPAL BONE OF RIGHT HAND WITH DELAYED HEALING, SUBSEQUENT ENCOUNTER: ICD-10-CM

## 2024-12-04 PROCEDURE — 97110 THERAPEUTIC EXERCISES: CPT

## 2024-12-04 PROCEDURE — 97165 OT EVAL LOW COMPLEX 30 MIN: CPT

## 2024-12-04 PROCEDURE — L3808 WHFO, RIGID W/O JOINTS: HCPCS
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
## 2024-12-04 NOTE — PATIENT INSTRUCTIONS
[facility], OCCUPATIONAL THERAPY  HOME EXERCISE PROGRAM               Complete massage 2-3 minutes 4 times a day:        Complete 10 repetitions of each exercise 4-6 times a day:                                     Copyright © I. All rights reserved.     Therapist: LORENA Wharton CHT       [facility] Occupational Therapy  Orthosis Instructions and Proof of Delivery        Date: 12/4/2024  Name: Syed Lyon  MRN: [id number]  YOB: 1985  Referring Provider: Gina Alcantar PA-C    [facility] Level II Code and Description  - Forearm based ulnar gutter orthosis     Orthosis Information  custom fabricated  Right  Static    Orthosis Instructions  Wear the orthosis at all times    Cleaning and Maintenance  Keep your orthosis away from any heat sources. Do not leave in your car.  Keep your orthosis away from pets.  You may clean your orthosis with cool water and soap or a mild cleanser.  Your orthosis may need adjustments due to changes in your medical condition (swelling, dressing size, additional surgery, etc.)  Monitor your skin color and integrity.  Do not try to adjust the orthosis on your own.     If you have any questions or concerns, please contact the therapy office at [phone].     I, Syed Lyon , have personally received the above described orthosis along with instructions on the wear, care, and precautions related to this orthosis. I understand that I am responsible for payment to [facility] of my deductible, coinsurance, or other portion of my charges not paid in full by my insurance plans, including any item that is not covered under the insurance plan.     Signature: _________________________________ Date: __________________    Therapist: TANGELA Wharton CHT

## 2024-12-05 NOTE — PROGRESS NOTES
"  Occupational Therapy Daily Treatment Note     Name: Syed Lyon  Clinic Number: [id number]    Therapy Diagnosis:   Encounter Diagnoses   Name Primary?    Right hand pain Yes    Stiffness of finger joint of right hand     Muscle weakness      Physician: Gina Alcantar PA-C    Visit Date: 12/9/2024  Physician Orders: Eval and Treat and Custom Ulnar Gutter Orthosis  Medical Diagnosis: S62.356A (ICD-10-CM) - Closed nondisplaced fracture of shaft of fifth metacarpal bone of right hand, initial encounter   Surgical Procedure and Date: Not for this condition  Evaluation Date: 12/4/2024  Insurance Authorization Period Expiration: 11/25/2025  Plan of Care Certification Period: 2/26/2025  Date of Return to MD:  12/23/2024  Visit # / Visits authorized: 2 / 20  FOTO Function Score:   38% 12/4/2024     Precautions:  Standard and Weightbearing  Time In: 8:00 am  Time Out: 8:45 am  Total Billable Time: 45 minutes    Subjective     Pt reports: "It's pretty stiff when I take the brace off, but feels better when I get some warm water on it."  she was compliant with home exercise program given last session.   Response to previous treatment: mild stiffness, but increased ROM, decreased pain   Functional change: has a little difficulty typing at work 2/2 bulky brace    Pain: 4/10  Location: right hand    Objective   Observation/Appearance:  Patient presents with Incision(s) healthy in appearance , Edema ring and small fingers and wrist, Joint stiffness, With protective splint in place        Edema. Measured in centimeters.    12/4/2024 12/4/2024     Right  Left    2in. Above elbow       2in. Below elbow       Proximal Wrist Crease 17 16.3   Figure of 8       MCPs          Edema. Measured in centimeters.    12/4/2024       Right     Index:         P1        PIP       P2        DIP       P3       Long:         P1        PIP       P2        DIP       P3       Ring:         P1            7 cm      PIP            6.9 cm     P2        "      5.9 cm      DIP       P3       Small:   6.6 cm      P1           6.3 cm       PIP        5.4 cm     P2               DIP       P3       Thumb:       Prox. Phalanx       IP       Distal Phalanx          Hand ROM. Measured in degrees.    12/4/2024 12/4/2024     Right Left        Full fist WNL   Index: MP  41                PIP     104                DIP 10                HOWELL               Long:  MP 38                PIP 92                DIP 11                HOWELL               Ring:   MP 35                PIP 48                DIP 15                HOWELL               Small:  MP 29                 PIP 40                 DIP 5                HOWELL               Thumb: MP 42                  IP 51         Rad ADD/ABD           Pal ADD/ABD              Opposition Tip of Sf         AROM  12/4/2024 12/4/2024     Right Left   Wrist Extension  36 67   Wrist Flexion 45 85   Radial Deviation 15 25   Ulnar Deviation 35 50   Supination WNL WNL   Pronation WNL WNL   Elbow Extension WNL WNL   Elbow Flexion WNL WNL         Sensation:     Pt reports paresthesias and/or numbness in ring and small - dorsally  To be assessed as appropriate.        CMS Impairment/Limitation/Restriction for FOTO Hand Survey     Therapist reviewed FOTO scores for Syed Lyon on 12/4/2024.   FOTO documents entered into [employer] - see Media section.     Function Score: 38%  Category: Self Care            Treatment      Syed received the following supervised modalities after being cleared for contradictions for 10 minutes:   -Patient tolerates paraffin w/ MHP to R hand for 10 min, pre-tx to decrease pain & increase tissue extensibility concurrent with HEP review and assessment of deficits.      Syed received the following manual therapy techniques for 10 minutes:   -Gentle STM to R hand in order to increase soft tissue extensibility, decrease pain, and tenderness/hypersensitivity in the stated area, and promote scar tissue remodeling.      Syed received  therapeutic exercises for 20 minutes including:  - TGEs 2/10  - joint blocking 2/10  - finger spreads 2/10  - finger lifts 2/10  - opposition 2/10  - wrist flex/ext 2/10     Syed participated in dynamic functional therapeutic activities to improve functional performance for 15  minutes, including:  - finger<>palm translation with yellow foam blocks 1 container   - isospheres palm down x 2 min   - towel walks x 2 min     Home Exercises and Education Provided     Education provided:   - HEP in place   - Progress towards goals     Written Home Exercises Provided: Patient instructed to cont prior HEP.  Exercises were reviewed and Syed was able to demonstrate them prior to the end of the session.  Syed demonstrated good  understanding of the HEP provided.   .   See EMR under Patient Instructions for exercises provided  at initial evaluation .        Assessment     Upon arrival, patient states that she is doing well, but having a good bit of stiffness in the R hand when removing her brace. She has been wearing her custom ulnar gutter brace which was fabricated for her at her IE. I encourage her to continue HEP multiple x per day to promote carryover of ROM gains. Today, we added some joint blocking and in-hand manip activities. She did have a little difficulty 2/2 the stiffness, but overall, ROM is already improving. Pt would continue to benefit from skilled OT. Continue skilled OT POC and progress per protocol as tolerated.      Syed is progressing well towards her goals and there are no updates to goals at this time. Pt prognosis is Good.     Pt will continue to benefit from skilled outpatient occupational therapy to address the deficits listed in the problem list on initial evaluation provide pt/family education and to maximize pt's level of independence in the home and community environment.     Anticipated barriers to occupational therapy: n/a    Pt's spiritual, cultural and educational needs considered and pt  agreeable to plan of care and goals.    Goals:  The following goals were discussed with the patient and patient is in agreement with them as to be addressed in the treatment plan.       Short Term Goals (STGs); to be met within 6 weeks 1/8/2025  STG #1: Pt will report a pain level of 2 out of 10 with use in light ADLs. Not met 12/9/2024, continue progressing   STG #2: Pt will demo improved FOTO score by 20 points.  Not met 12/9/2024, continue progressing   STG #3: Pt will reports independence in light ADL's with use of the involved Hand/wrist/UE Not met 12/9/2024, continue progressing   STG #4: Pt will demonstrate independence with issued HEP. Met, 12/9/2024  STG #5: Pt will demo ability to form a loose fist Not met 12/9/2024, continue progressing   STG #6: Pt will demo 30 degree increase in wrist flex/extension combined. Not met 12/9/2024, continue progressing     Long Term Goals (LTGs); to be met by discharge.  LTG #1: Pt will report a pain level of 2 out of 10 with ADLs/IADLs Not met 12/9/2024, continue progressing          LTG #2: Pt will demo improved FOTO score by 40 points. Not met 12/9/2024, continue progressing   LTG #3: Pt will return to prior level of function for ADLs /IADLs Not met 12/9/2024, continue progressing       Plan   Continue skilled OT POC and progress per protocol as tolerated.     Stacey Barker, OT

## 2024-12-09 ENCOUNTER — CLINICAL SUPPORT (OUTPATIENT)
Dept: REHABILITATION | Facility: HOSPITAL | Age: 39
End: 2024-12-09
Payer: COMMERCIAL

## 2024-12-09 DIAGNOSIS — M79.641 RIGHT HAND PAIN: Primary | ICD-10-CM

## 2024-12-09 DIAGNOSIS — M25.641 STIFFNESS OF FINGER JOINT OF RIGHT HAND: ICD-10-CM

## 2024-12-09 DIAGNOSIS — M62.81 MUSCLE WEAKNESS: ICD-10-CM

## 2024-12-09 PROCEDURE — 97140 MANUAL THERAPY 1/> REGIONS: CPT

## 2024-12-09 PROCEDURE — 97018 PARAFFIN BATH THERAPY: CPT

## 2024-12-09 PROCEDURE — 97530 THERAPEUTIC ACTIVITIES: CPT

## 2024-12-09 PROCEDURE — 97110 THERAPEUTIC EXERCISES: CPT

## 2024-12-09 NOTE — PATIENT INSTRUCTIONS
"[facility] - OCCUPATIONAL THERAPY  HOME EXERCISE PROGRAM     Complete the following massages for 5-10 minutes each, 3x/day.   Heat Pack PRN 5-10 min for pain and stiffness    Complete the following exercises with 10 repetitions each, 4-6x/day.     AROM: DIP Flexion / Extension  Pinch middle knuckle to prevent bending. Bend end knuckle until stretch is felt.   Hold 3 seconds. Relax. Straighten finger as far as possible.    AROM: PIP Flexion / Extension  Pinch bottom knuckle  to prevent bending. Actively bend middle knuckle until stretch is felt.   Hold 3 seconds. Relax. Straighten finger as far as possible.    AROM: Isolated PIP Flexion  Bend only middle joint of your finger, keeping other fingers straight with other hand.    AROM: Isolated MCP Flexion / Extension ("Wave")   Bend only your large, bottom knuckles. Hold 3 seconds. Keep the tips of your fingers straight. Straighten fingers.    AROM: Isolated IPJ Flexion / Extension ("Hook")  Bend only your middle and end knuckles. Hold 3 seconds.   Straighten your fingers.     AROM: MCP and PIP Flexion / Extension ("Straight Fist")  Bend your bottom and middle knuckles, keeping the tips of your fingers straight. Try to touch the pads of your fingers on your palm. Hold 3 seconds. Straighten your fingers.         AROM: Composte Extension ("Finger Lifts")  Lift your finger off of the table one at a time. Hold 3 seconds. Relax your finger.        Therapist: Stacey Barker, [place], OTR/L   "

## 2024-12-10 NOTE — PROGRESS NOTES
"  Occupational Therapy Daily Treatment Note     Name: Syed Lyon  Clinic Number: [id number]    Therapy Diagnosis:   Encounter Diagnoses   Name Primary?    Right hand pain Yes    Stiffness of finger joint of right hand     Muscle weakness        Physician: Gina Alcantar PA-C    Visit Date: 12/11/2024  Physician Orders: Eval and Treat and Custom Ulnar Gutter Orthosis  Medical Diagnosis: S62.356A (ICD-10-CM) - Closed nondisplaced fracture of shaft of fifth metacarpal bone of right hand, initial encounter   Surgical Procedure and Date: Not for this condition  Evaluation Date: 12/4/2024  Insurance Authorization Period Expiration: 11/25/2025  Plan of Care Certification Period: 2/26/2025  Date of Return to MD:  12/23/2024  Visit # / Visits authorized: 3 / 20  FOTO Function Score:   38% 12/4/2024     Precautions:  Standard and Weightbearing  Time In: 4:00 pm  Time Out: 4:50 pm  Total Billable Time: 50 minutes    Subjective     Pt reports: "I did my exercises a few times before I came today, so it's a little sore."  she was compliant with home exercise program given last session.   Response to previous treatment: mild stiffness, but increased ROM, decreased pain   Functional change: has a little difficulty typing at work 2/2 bulky brace    Pain: 3/10  Location: right hand    Objective   Observation/Appearance:  Patient presents with Incision(s) healthy in appearance , Edema ring and small fingers and wrist, Joint stiffness, With protective splint in place     Edema. Measured in centimeters.    12/4/2024 12/4/2024     Right  Left    2in. Above elbow       2in. Below elbow       Proximal Wrist Crease 17 16.3   Figure of 8       MCPs          Edema. Measured in centimeters.    12/4/2024       Right     Ring:         P1            7 cm      PIP            6.9 cm     P2             5.9 cm      DIP       P3       Small:   6.6 cm      P1           6.3 cm       PIP        5.4 cm     P2               DIP       P3        "   Hand ROM. Measured in degrees.    12/4/2024 12/4/2024      Right Left         Full fist WNL    Index: MP  41                 PIP     104                 DIP 10                 HOWELL                 Long:  MP 38                 PIP 92                 DIP 11                 HOWELL                 Ring:   MP 35                 PIP 48                 DIP 15                 HOWELL                 Small:  MP 29                  PIP 40                  DIP 5                 HOWELL                 Thumb: MP 42                   IP 51          Rad ADD/ABD            Pal ADD/ABD               Opposition Tip of Sf          AROM  12/4/2024 12/4/2024     Right Left   Wrist Extension  36 67   Wrist Flexion 45 85   Radial Deviation 15 25   Ulnar Deviation 35 50   Supination WNL WNL   Pronation WNL WNL   Elbow Extension WNL WNL   Elbow Flexion WNL WNL         Sensation:     Pt reports paresthesias and/or numbness in ring and small - dorsally  To be assessed as appropriate.        CMS Impairment/Limitation/Restriction for FOTO Hand Survey     Therapist reviewed FOTO scores for Syed Lyon on 12/4/2024.   FOTO documents entered into [employer] - see Media section.     Function Score: 38%  Category: Self Care            Treatment      Syed received the following supervised modalities after being cleared for contradictions for 10 minutes:   -Patient tolerates paraffin w/ MHP to R hand for 10 min, pre-tx to decrease pain & increase tissue extensibility concurrent with HEP review and assessment of deficits.      Syed received the following manual therapy techniques for 10 minutes:   -Gentle STM to R hand in order to increase soft tissue extensibility, decrease pain, and tenderness/hypersensitivity in the stated area, and promote scar tissue remodeling.      Syed received therapeutic exercises for 20 minutes including:  - TGEs 2/10  - joint blocking 2/10  - finger spreads 2/10  - finger lifts 2/10  - opposition 2/10  - wrist flex/ext/RD 0# dowel  2/10   - targets x 10 each size   - hand pumps with foam ADD squeeze 2/10  - dowel rolls hook<>fist 2/10    Syed participated in dynamic functional therapeutic activities to improve functional performance for 15  minutes, including:  - large pom ulnar scoops x 20   - isospheres palm down x 2 min   - towel walks x 3 min     Home Exercises and Education Provided     Education provided:   - HEP in place   - Progress towards goals     Written Home Exercises Provided: Patient instructed to cont prior HEP.  Exercises were reviewed and Syed was able to demonstrate them prior to the end of the session.  Syed demonstrated good  understanding of the HEP provided.   .   See EMR under Patient Instructions for exercises provided  at initial evaluation .        Assessment   Patient is 2+ months post injury. She states that her hand can still get a little sore especially after exercises and with the colder weather coming. Still presenting very guarded 2/2 pain, which limits her fxnlly regarding ROM. Patient tolerates P/AA/AROM exs to per protocol to increase functional and available ROM of R hand. Patient demos increase in ROM post exs and reports no true pain. I instruct patient on specific HEP to promote carryover of ROM gains. Patient would continue to benefit from skilled OT services. Continue POC and progress as tolerated per protocol.      Syed is progressing well towards her goals and there are no updates to goals at this time. Pt prognosis is Good.     Pt will continue to benefit from skilled outpatient occupational therapy to address the deficits listed in the problem list on initial evaluation provide pt/family education and to maximize pt's level of independence in the home and community environment.     Anticipated barriers to occupational therapy: n/a    Pt's spiritual, cultural and educational needs considered and pt agreeable to plan of care and goals.    Goals:  The following goals were discussed with the  patient and patient is in agreement with them as to be addressed in the treatment plan.       Short Term Goals (STGs); to be met within 6 weeks 1/8/2025  STG #1: Pt will report a pain level of 2 out of 10 with use in light ADLs. Not met 12/11/2024, continue progressing   STG #2: Pt will demo improved FOTO score by 20 points.  Not met 12/11/2024, continue progressing   STG #3: Pt will reports independence in light ADL's with use of the involved Hand/wrist/UE Not met 12/11/2024, continue progressing   STG #4: Pt will demonstrate independence with issued HEP. Met, 12/11/2024  STG #5: Pt will demo ability to form a loose fist. Not met 12/11/2024, continue progressing   STG #6: Pt will demo 30 degree increase in wrist flex/extension combined. Not met 12/11/2024, continue progressing     Long Term Goals (LTGs); to be met by discharge.  LTG #1: Pt will report a pain level of 2 out of 10 with ADLs/IADLs Not met 12/11/2024, continue progressing          LTG #2: Pt will demo improved FOTO score by 40 points. Not met 12/11/2024, continue progressing   LTG #3: Pt will return to prior level of function for ADLs /IADLs Not met 12/11/2024, continue progressing       Plan   Continue skilled OT POC and progress per protocol as tolerated.     Stacey Barker, OT

## 2024-12-11 ENCOUNTER — CLINICAL SUPPORT (OUTPATIENT)
Dept: REHABILITATION | Facility: HOSPITAL | Age: 39
End: 2024-12-11
Payer: COMMERCIAL

## 2024-12-11 DIAGNOSIS — M62.81 MUSCLE WEAKNESS: ICD-10-CM

## 2024-12-11 DIAGNOSIS — M25.641 STIFFNESS OF FINGER JOINT OF RIGHT HAND: ICD-10-CM

## 2024-12-11 DIAGNOSIS — M79.641 RIGHT HAND PAIN: Primary | ICD-10-CM

## 2024-12-11 PROCEDURE — 97018 PARAFFIN BATH THERAPY: CPT

## 2024-12-11 PROCEDURE — 97530 THERAPEUTIC ACTIVITIES: CPT

## 2024-12-11 PROCEDURE — 97140 MANUAL THERAPY 1/> REGIONS: CPT

## 2024-12-11 PROCEDURE — 97110 THERAPEUTIC EXERCISES: CPT

## 2024-12-11 NOTE — PROGRESS NOTES
"  Occupational Therapy Daily Treatment Note     Name: Syed Lyon  Clinic Number: [id number]    Therapy Diagnosis:   Encounter Diagnoses   Name Primary?    Right hand pain Yes    Stiffness of finger joint of right hand     Muscle weakness      Physician: Gina Alcantar PA-C    Visit Date: 12/12/2024  Physician Orders: Eval and Treat and Custom Ulnar Gutter Orthosis  Medical Diagnosis: S62.356A (ICD-10-CM) - Closed nondisplaced fracture of shaft of fifth metacarpal bone of right hand, initial encounter   Surgical Procedure and Date: Not for this condition  Evaluation Date: 12/4/2024  Insurance Authorization Period Expiration: 11/25/2025  Plan of Care Certification Period: 2/26/2025  Date of Return to MD:  12/23/2024  Visit # / Visits authorized: 3 / 20  FOTO Function Score:   38% 12/4/2024     Precautions:  Standard and Weightbearing  Time In: 3:05 pm  Time Out: 4:00 pm  Total Billable Time: 55 minutes    Subjective     Pt reports: "If the pain would just go away, I'd be good."  she was compliant with home exercise program given last session.   Response to previous treatment: mild stiffness, but increased ROM, decreased pain   Functional change: has a little difficulty typing at work 2/2 bulky brace    Pain: 8/10  Location: right hand    Objective   Observation/Appearance:  Patient presents with Incision(s) healthy in appearance , Edema ring and small fingers and wrist, Joint stiffness, With protective splint in place        Edema. Measured in centimeters.    12/4/2024 12/4/2024     Right  Left    2in. Above elbow       2in. Below elbow       Proximal Wrist Crease 17 16.3   Figure of 8       MCPs          Edema. Measured in centimeters.    12/4/2024       Right     Index:         P1        PIP       P2        DIP       P3       Long:         P1        PIP       P2        DIP       P3       Ring:         P1            7 cm      PIP            6.9 cm     P2             5.9 cm      DIP       P3       Small:   6.6 " cm      P1           6.3 cm       PIP        5.4 cm     P2               DIP       P3       Thumb:       Prox. Phalanx       IP       Distal Phalanx          Hand ROM. Measured in degrees.    12/4/2024 12/4/2024 12/12/2024     Right Left  Right       Full fist WNL Post fluido   Index: MP  41   67              PIP     104   105              DIP 10   25              HOWELL                 Long:  MP 38   75              PIP 92   105              DIP 11   30              HOWELL                 Ring:   MP 35   70              PIP 48   85              DIP 15   30              HOWELL                 Small:  MP 29   35               PIP 40   70               DIP 5   35              HOWELL                 Thumb: MP 42   45                IP 51   75       Rad ADD/ABD            Pal ADD/ABD               Opposition Tip of Sf    Base of SF      AROM  12/4/2024 12/4/2024     Right Left   Wrist Extension  36 67   Wrist Flexion 45 85   Radial Deviation 15 25   Ulnar Deviation 35 50   Supination WNL WNL   Pronation WNL WNL   Elbow Extension WNL WNL   Elbow Flexion WNL WNL         Sensation:     Pt reports paresthesias and/or numbness in ring and small - dorsally  To be assessed as appropriate.        CMS Impairment/Limitation/Restriction for FOTO Hand Survey     Therapist reviewed FOTO scores for Syed Lyon on 12/4/2024.   FOTO documents entered into [employer] - see Media section.     Function Score: 38%  Category: Self Care            Treatment      Syed received the following supervised modalities after being cleared for contradictions for 10 minutes:   -Patient tolerates tx of therapeutic fluidotherapy in order to decrease hypersensitivity, decrease pain, and/or increase soft tissue extensibility during AROM.      Direct contact modalities after being cleared for contraindications: x 10 min  Therapeutic ultrasound to R dorsal hand in order to decrease pain in the affected area, increase soft tissue extensibility, and promote ROM tolerance.  Paremeters: 0.8 W/cm2; 3.3 MHz    Manual therapy techniques: n/a    Syed received therapeutic exercises for 25 minutes including:  - ROM in fluido  - TGEs 2/10 w  - joint blocking 2/10  - finger spreads 2/10  - finger lifts 2/10  - opposition 2/10  - wrist flex/ext/RD 0# dowel 2/10   - targets x 10 each size   - hand pumps with foam ADD squeeze 2/10  - dowel rolls hook<>fist 2/10    Syed participated in dynamic functional therapeutic activities to improve functional performance for 20 minutes, including:  - finger<>palm translation with yellow foam blocks 1 container   - isospheres palm down x 2 min   - towel walks x 2 min     Home Exercises and Education Provided     Education provided:   - HEP in place   - Progress towards goals     Written Home Exercises Provided: Patient instructed to cont prior HEP.  Exercises were reviewed and Syed was able to demonstrate them prior to the end of the session.  Syed demonstrated good  understanding of the HEP provided.   .   See EMR under Patient Instructions for exercises provided  at initial evaluation .        Assessment   Patient continues to be limited primarily by pain and stiffness in the R hand. This causes her to guard the hand with her ROM activities. She has been wearing the edema glove which seems to be helping with edema. Per assessment, ROM in improving, but continues to have pain with ROM, mostly in the IPJs. Pt would continue to benefit from skilled OT. Continue skilled OT POC and progress per protocol as tolerated.      Syed is progressing well towards her goals and there are no updates to goals at this time. Pt prognosis is Good.     Pt will continue to benefit from skilled outpatient occupational therapy to address the deficits listed in the problem list on initial evaluation provide pt/family education and to maximize pt's level of independence in the home and community environment.     Anticipated barriers to occupational therapy: n/a    Pt's  spiritual, cultural and educational needs considered and pt agreeable to plan of care and goals.    Goals:  The following goals were discussed with the patient and patient is in agreement with them as to be addressed in the treatment plan.       Short Term Goals (STGs); to be met within 6 weeks 1/8/2025  STG #1: Pt will report a pain level of 2 out of 10 with use in light ADLs. Not met 12/12/2024, continue progressing   STG #2: Pt will demo improved FOTO score by 20 points.  Not met 12/12/2024, continue progressing   STG #3: Pt will reports independence in light ADL's with use of the involved Hand/wrist/UE Not met 12/12/2024, continue progressing   STG #4: Pt will demonstrate independence with issued HEP. Met, 12/12/2024  STG #5: Pt will demo ability to form a loose fist Not met 12/12/2024, continue progressing   STG #6: Pt will demo 30 degree increase in wrist flex/extension combined. Not met 12/12/2024, continue progressing     Long Term Goals (LTGs); to be met by discharge.  LTG #1: Pt will report a pain level of 2 out of 10 with ADLs/IADLs Not met 12/12/2024, continue progressing          LTG #2: Pt will demo improved FOTO score by 40 points. Not met 12/12/2024, continue progressing   LTG #3: Pt will return to prior level of function for ADLs /IADLs Not met 12/12/2024, continue progressing       Plan   Continue skilled OT POC and progress per protocol as tolerated.     Stacey Barker, OT

## 2024-12-12 ENCOUNTER — CLINICAL SUPPORT (OUTPATIENT)
Dept: REHABILITATION | Facility: HOSPITAL | Age: 39
End: 2024-12-12
Payer: COMMERCIAL

## 2024-12-12 DIAGNOSIS — M25.641 STIFFNESS OF FINGER JOINT OF RIGHT HAND: ICD-10-CM

## 2024-12-12 DIAGNOSIS — M62.81 MUSCLE WEAKNESS: ICD-10-CM

## 2024-12-12 DIAGNOSIS — M79.641 RIGHT HAND PAIN: Primary | ICD-10-CM

## 2024-12-12 PROCEDURE — 97035 APP MDLTY 1+ULTRASOUND EA 15: CPT

## 2024-12-12 PROCEDURE — 97530 THERAPEUTIC ACTIVITIES: CPT

## 2024-12-12 PROCEDURE — 97110 THERAPEUTIC EXERCISES: CPT

## 2024-12-12 PROCEDURE — 97022 WHIRLPOOL THERAPY: CPT

## 2024-12-13 ENCOUNTER — PATIENT MESSAGE (OUTPATIENT)
Dept: DERMATOLOGY | Facility: CLINIC | Age: 39
End: 2024-12-13
Payer: COMMERCIAL

## 2024-12-16 ENCOUNTER — CLINICAL SUPPORT (OUTPATIENT)
Dept: REHABILITATION | Facility: HOSPITAL | Age: 39
End: 2024-12-16
Payer: COMMERCIAL

## 2024-12-16 DIAGNOSIS — M79.641 RIGHT HAND PAIN: Primary | ICD-10-CM

## 2024-12-16 DIAGNOSIS — M25.641 STIFFNESS OF FINGER JOINT OF RIGHT HAND: ICD-10-CM

## 2024-12-16 DIAGNOSIS — M62.81 MUSCLE WEAKNESS: ICD-10-CM

## 2024-12-16 PROCEDURE — 97140 MANUAL THERAPY 1/> REGIONS: CPT

## 2024-12-16 PROCEDURE — 97110 THERAPEUTIC EXERCISES: CPT

## 2024-12-16 PROCEDURE — 97018 PARAFFIN BATH THERAPY: CPT

## 2024-12-16 PROCEDURE — 97530 THERAPEUTIC ACTIVITIES: CPT

## 2024-12-16 NOTE — PROGRESS NOTES
"  Occupational Therapy Daily Treatment Note     Name: Syed Lyon  Clinic Number: [id number]    Therapy Diagnosis:   Encounter Diagnoses   Name Primary?    Right hand pain Yes    Stiffness of finger joint of right hand     Muscle weakness      Physician: Gina Alcantar PA-C    Visit Date: 12/16/2024  Physician Orders: Eval and Treat and Custom Ulnar Gutter Orthosis  Medical Diagnosis: S62.356A (ICD-10-CM) - Closed nondisplaced fracture of shaft of fifth metacarpal bone of right hand, initial encounter   Surgical Procedure and Date: Not for this condition  Evaluation Date: 12/4/2024  Insurance Authorization Period Expiration: 11/25/2025  Plan of Care Certification Period: 2/26/2025  Date of Return to MD:  12/23/2024  Visit # / Visits authorized:  / 20  FOTO Function Score:   38% 12/4/2024     Precautions:  Standard and Weightbearing  Time In: 3:05 pm  Time Out: 3:55 pm  Total Billable Time: 50 minutes    Subjective     Pt reports: "I'm ok, just agnes random shooting pains."  she was compliant with home exercise program given last session.   Response to previous treatment: mild stiffness, but increased ROM, decreased pain   Functional change: has a little difficulty typing at work 2/2 bulky brace    Pain: 8/10  Location: right hand    Objective   Observation/Appearance:  Patient presents with Incision(s) healthy in appearance , Edema ring and small fingers and wrist, Joint stiffness, With protective splint in place        Edema. Measured in centimeters.    12/4/2024 12/4/2024     Right  Left    2in. Above elbow       2in. Below elbow       Proximal Wrist Crease 17 16.3   Figure of 8       MCPs          Edema. Measured in centimeters.    12/4/2024       Right     Index:         P1        PIP       P2        DIP       P3       Long:         P1        PIP       P2        DIP       P3       Ring:         P1            7 cm      PIP            6.9 cm     P2             5.9 cm      DIP       P3       Small:   6.6 cm   "    P1           6.3 cm       PIP        5.4 cm     P2               DIP       P3       Thumb:       Prox. Phalanx       IP       Distal Phalanx          Hand ROM. Measured in degrees.    12/4/2024 12/4/2024 12/12/2024     Right Left  Right       Full fist WNL Post fluido   Index: MP  41   67              PIP     104   105              DIP 10   25              HOWELL                 Long:  MP 38   75              PIP 92   105              DIP 11   30              HOWELL                 Ring:   MP 35   70              PIP 48   85              DIP 15   30              HOWELL                 Small:  MP 29   35               PIP 40   70               DIP 5   35              HOWELL                 Thumb: MP 42   45                IP 51   75       Rad ADD/ABD            Pal ADD/ABD               Opposition Tip of Sf    Base of SF      AROM  12/4/2024 12/4/2024     Right Left   Wrist Extension  36 67   Wrist Flexion 45 85   Radial Deviation 15 25   Ulnar Deviation 35 50   Supination WNL WNL   Pronation WNL WNL   Elbow Extension WNL WNL   Elbow Flexion WNL WNL         Sensation:     Pt reports paresthesias and/or numbness in ring and small - dorsally  To be assessed as appropriate.        CMS Impairment/Limitation/Restriction for FOTO Hand Survey     Therapist reviewed FOTO scores for Syed Lyon on 12/4/2024.   FOTO documents entered into [employer] - see Media section.     Function Score: 38%  Category: Self Care            Treatment      Syed received the following supervised modalities after being cleared for contradictions for 10 minutes:   -Patient tolerates paraffin w/ MHP to R hand for 10 min, pre-tx to decrease pain & increase tissue extensibility concurrent with assessment of deficits     Direct contact modalities after being cleared for contraindications:   N/a    Manual therapy techniques: x 10 min   Gentle IASTM to dorsal/ulnar hand in order to increase soft tissue extensibility, decrease pain, and  "tenderness/hypersensitivity in the stated area, and promote scar tissue remodeling.      Syed received therapeutic exercises for 20 minutes including:  - TGEs 2/10 w  - joint blocking 2/10  - reverse block x10   - finger spreads 2/10  - finger lifts 2/10  - opposition 2/10  - wrist flex/ext/RD 0# dowel 2/10   - targets x 10 each size   - hand pumps with foam ADD squeeze 2/10  - dowel rolls hook<>fist 2/10    Syed participated in dynamic functional therapeutic activities to improve functional performance for 20 minutes, including:  - finger<>palm translation with yellow foam blocks 2 container   - ulnar scoops 3 rounds   - isospheres palm down x 2 min   - towel walks x 2 min   - wrist maze x 2 min    Home Exercises and Education Provided     Education provided:   - HEP in place   - Progress towards goals     Written Home Exercises Provided: Patient instructed to cont prior HEP.  Exercises were reviewed and Syed was able to demonstrate them prior to the end of the session.  Syed demonstrated good  understanding of the HEP provided.   .   See EMR under Patient Instructions for exercises provided  at initial evaluation .        Assessment   Patient continues to be limited by pain and stiffness of the R hand. She has been wearing her compression glove and orthosis, but states she has been having "shooting pains and itching" along the fracture site. She says that she took the splint off to comb her daughter's hair this weekend which may attribute to some of the pain. Patient tolerates P/AA/AROM exs to per protocol to increase functional and available ROM of R hand. Patient demos increase in ROM post exs and reports ulnar sided pain with most movements. I instruct patient on specific HEP to promote carryover of ROM gains. Continue POC and progress as tolerated per protocol.      Syed is progressing well towards her goals and there are no updates to goals at this time. Pt prognosis is Good.     Pt will continue to " benefit from skilled outpatient occupational therapy to address the deficits listed in the problem list on initial evaluation provide pt/family education and to maximize pt's level of independence in the home and community environment.     Anticipated barriers to occupational therapy: n/a    Pt's spiritual, cultural and educational needs considered and pt agreeable to plan of care and goals.    Goals:  The following goals were discussed with the patient and patient is in agreement with them as to be addressed in the treatment plan.       Short Term Goals (STGs); to be met within 6 weeks 1/8/2025  STG #1: Pt will report a pain level of 2 out of 10 with use in light ADLs. Not met 12/16/2024, continue progressing   STG #2: Pt will demo improved FOTO score by 20 points.  Not met 12/16/2024, continue progressing   STG #3: Pt will reports independence in light ADL's with use of the involved Hand/wrist/UE Not met 12/16/2024, continue progressing   STG #4: Pt will demonstrate independence with issued HEP. Met, 12/16/2024  STG #5: Pt will demo ability to form a loose fist Not met 12/16/2024, continue progressing   STG #6: Pt will demo 30 degree increase in wrist flex/extension combined. Not met 12/16/2024, continue progressing     Long Term Goals (LTGs); to be met by discharge.  LTG #1: Pt will report a pain level of 2 out of 10 with ADLs/IADLs Not met 12/16/2024, continue progressing          LTG #2: Pt will demo improved FOTO score by 40 points. Not met 12/16/2024, continue progressing   LTG #3: Pt will return to prior level of function for ADLs /IADLs Not met 12/16/2024, continue progressing       Plan   Continue skilled OT POC and progress per protocol as tolerated.     Stacey Barker, OT

## 2024-12-17 NOTE — PROGRESS NOTES
"  Occupational Therapy Daily Treatment Note     Name: Syed Lyon  Clinic Number: [id number]    Therapy Diagnosis:   Encounter Diagnoses   Name Primary?    Right hand pain Yes    Stiffness of finger joint of right hand     Muscle weakness      Physician: Gina Alcantar PA-C    Visit Date: 12/18/2024  Physician Orders: Eval and Treat and Custom Ulnar Gutter Orthosis  Medical Diagnosis: S62.356A (ICD-10-CM) - Closed nondisplaced fracture of shaft of fifth metacarpal bone of right hand, initial encounter   Surgical Procedure and Date: Not for this condition  Evaluation Date: 12/4/2024  Insurance Authorization Period Expiration: 11/25/2025  Plan of Care Certification Period: 2/26/2025  Date of Return to MD:  12/23/2024  Visit # / Visits authorized: 5 / 20  FOTO Function Score: 2/3   38% 12/4/2024  38% 12/18/2024     Precautions:  Standard and Weightbearing  Time In: 3:00 pm  Time Out: 3:50 pm  Total Billable Time: 50 minutes    Subjective     Pt reports: "It's doing ok but hurts when I move it."  she was compliant with home exercise program given last session.   Response to previous treatment: mild stiffness, but increased ROM, decreased pain   Functional change: has a little difficulty typing at work 2/2 bulky brace    Pain: 7/10  Location: right hand    Objective   Observation/Appearance:  Patient presents with Incision(s) healthy in appearance , Edema ring and small fingers and wrist, Joint stiffness, With protective splint in place        Edema. Measured in centimeters.    12/4/2024 12/4/2024     Right  Left    2in. Above elbow       2in. Below elbow       Proximal Wrist Crease 17 16.3   Figure of 8       MCPs          Edema. Measured in centimeters.    12/4/2024       Right     Index:         P1        PIP       P2        DIP       P3       Long:         P1        PIP       P2        DIP       P3       Ring:         P1            7 cm      PIP            6.9 cm     P2             5.9 cm      DIP       P3     "   Small:   6.6 cm      P1           6.3 cm       PIP        5.4 cm     P2               DIP       P3       Thumb:       Prox. Phalanx       IP       Distal Phalanx          Hand ROM. Measured in degrees.    12/4/2024 12/4/2024 12/12/2024     Right Left  Right       Full fist WNL Post fluido   Index: MP  41   67              PIP     104   105              DIP 10   25              HOWELL                 Long:  MP 38   75              PIP 92   105              DIP 11   30              HOWELL                 Ring:   MP 35   70              PIP 48   85              DIP 15   30              HOWELL                 Small:  MP 29   35               PIP 40   70               DIP 5   35              HOWELL                 Thumb: MP 42   45                IP 51   75       Rad ADD/ABD            Pal ADD/ABD               Opposition Tip of Sf    Base of SF      AROM  12/4/2024 12/4/2024     Right Left   Wrist Extension  36 67   Wrist Flexion 45 85   Radial Deviation 15 25   Ulnar Deviation 35 50   Supination WNL WNL   Pronation WNL WNL   Elbow Extension WNL WNL   Elbow Flexion WNL WNL         Sensation:     Pt reports paresthesias and/or numbness in ring and small - dorsally  To be assessed as appropriate.        CMS Impairment/Limitation/Restriction for FOTO Hand Survey     Therapist reviewed FOTO scores for Syed Lyon on 12/4/2024.   FOTO documents entered into [email or web address] - see Media section.     Function Score: 38%  Category: Self Care            Treatment      Syed received the following supervised modalities after being cleared for contradictions for 10 minutes:   -Patient tolerates tx of therapeutic fluidotherapy in order to decrease hypersensitivity, decrease pain, and/or increase soft tissue extensibility during AROM.      Direct contact modalities after being cleared for contraindications:   N/a    Manual therapy techniques: x 10 min   Gentle IASTM to dorsal/ulnar hand in order to increase soft tissue extensibility, decrease pain,  "and tenderness/hypersensitivity in the stated area, and promote scar tissue remodeling.      Syed received therapeutic exercises for 20 minutes including:  - ROM in fluido  - TGEs 2/10 w  - joint blocking 2/10  - reverse block x10   - finger spreads 2/10  - finger lifts 2/10  - opposition 2/10  - wrist flex/ext/RD 0# dowel 2/10 (bs donned)  - targets x 10 each size   - hand pumps with foam ADD squeeze 2/10  - dowel rolls hook<>fist 2/10  - p/h half fist     Syed participated in dynamic functional therapeutic activities to improve functional performance for 20 minutes, including:  - finger<>palm translation with yellow foam blocks 2 container    -need verbal/tactile cues t/o for correct technique  - isospheres palm down x 2 min   - towel walks x 2 min   - wrist maze x 2 min  - yellow sponge hides x 10  - pom kicks 1 rd    Home Exercises and Education Provided     Education provided:   - HEP in place   - Progress towards goals     Written Home Exercises Provided: Patient instructed to cont prior HEP.  Exercises were reviewed and Syed was able to demonstrate them prior to the end of the session.  Syed demonstrated good  understanding of the HEP provided.   .   See EMR under Patient Instructions for exercises provided  at initial evaluation .        Assessment   Patient continues to be limited by pain and stiffness of the R hand. When attempting to make a fist, she demos a modified "hook fist".  Patient tolerates P/AA/AROM exs to per protocol to increase functional and available ROM of R hand. Patient demos increase in ROM post exs and reports pain with most movements. It is difficult to progress as she c/o intense ulnar sided pain with any A/PROM as well as sever pulling at the IPJs of the R RF/SF. We performed some exercises today with a avis strap donned to promote ROM. Patient requires multiple verbal/tactile cues t/o tx session for correct technique and attn to task. Continue POC and progress as tolerated per " protocol.      Syed is progressing well towards her goals and there are no updates to goals at this time. Pt prognosis is Good.     Pt will continue to benefit from skilled outpatient occupational therapy to address the deficits listed in the problem list on initial evaluation provide pt/family education and to maximize pt's level of independence in the home and community environment.     Anticipated barriers to occupational therapy: n/a    Pt's spiritual, cultural and educational needs considered and pt agreeable to plan of care and goals.    Goals:  The following goals were discussed with the patient and patient is in agreement with them as to be addressed in the treatment plan.       Short Term Goals (STGs); to be met within 6 weeks 1/8/2025  STG #1: Pt will report a pain level of 2 out of 10 with use in light ADLs. Not met 12/18/2024, continue progressing   STG #2: Pt will demo improved FOTO score by 20 points.  Not met 12/18/2024, continue progressing   STG #3: Pt will reports independence in light ADL's with use of the involved Hand/wrist/UE Not met 12/18/2024, continue progressing   STG #4: Pt will demonstrate independence with issued HEP. Met, 12/18/2024  STG #5: Pt will demo ability to form a loose fist Not met 12/18/2024, continue progressing   STG #6: Pt will demo 30 degree increase in wrist flex/extension combined. Not met 12/18/2024, continue progressing     Long Term Goals (LTGs); to be met by discharge.  LTG #1: Pt will report a pain level of 2 out of 10 with ADLs/IADLs Not met 12/18/2024, continue progressing          LTG #2: Pt will demo improved FOTO score by 40 points. Not met 12/18/2024, continue progressing   LTG #3: Pt will return to prior level of function for ADLs /IADLs Not met 12/18/2024, continue progressing       Plan   Continue skilled OT POC and progress per protocol as tolerated.     Stacey Barker, OT

## 2024-12-18 ENCOUNTER — CLINICAL SUPPORT (OUTPATIENT)
Dept: REHABILITATION | Facility: HOSPITAL | Age: 39
End: 2024-12-18
Payer: COMMERCIAL

## 2024-12-18 DIAGNOSIS — M25.641 STIFFNESS OF FINGER JOINT OF RIGHT HAND: ICD-10-CM

## 2024-12-18 DIAGNOSIS — M62.81 MUSCLE WEAKNESS: ICD-10-CM

## 2024-12-18 DIAGNOSIS — M79.641 RIGHT HAND PAIN: Primary | ICD-10-CM

## 2024-12-18 PROCEDURE — 97530 THERAPEUTIC ACTIVITIES: CPT

## 2024-12-18 PROCEDURE — 97140 MANUAL THERAPY 1/> REGIONS: CPT

## 2024-12-18 PROCEDURE — 97022 WHIRLPOOL THERAPY: CPT

## 2024-12-18 PROCEDURE — 97110 THERAPEUTIC EXERCISES: CPT

## 2024-12-19 ENCOUNTER — CLINICAL SUPPORT (OUTPATIENT)
Dept: REHABILITATION | Facility: HOSPITAL | Age: 39
End: 2024-12-19
Payer: COMMERCIAL

## 2024-12-19 DIAGNOSIS — M25.641 STIFFNESS OF FINGER JOINT OF RIGHT HAND: ICD-10-CM

## 2024-12-19 DIAGNOSIS — M62.81 MUSCLE WEAKNESS: ICD-10-CM

## 2024-12-19 DIAGNOSIS — M79.641 RIGHT HAND PAIN: Primary | ICD-10-CM

## 2024-12-19 PROCEDURE — 97110 THERAPEUTIC EXERCISES: CPT

## 2024-12-19 PROCEDURE — 97022 WHIRLPOOL THERAPY: CPT

## 2024-12-19 PROCEDURE — 97530 THERAPEUTIC ACTIVITIES: CPT

## 2024-12-19 PROCEDURE — 97112 NEUROMUSCULAR REEDUCATION: CPT
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
## 2024-12-23 ENCOUNTER — CLINICAL SUPPORT (OUTPATIENT)
Dept: REHABILITATION | Facility: HOSPITAL | Age: 39
End: 2024-12-23
Payer: COMMERCIAL

## 2024-12-23 ENCOUNTER — OFFICE VISIT (OUTPATIENT)
Dept: ORTHOPEDICS | Facility: CLINIC | Age: 39
End: 2024-12-23
Payer: COMMERCIAL

## 2024-12-23 ENCOUNTER — HOSPITAL ENCOUNTER (OUTPATIENT)
Dept: RADIOLOGY | Facility: OTHER | Age: 39
Discharge: HOME | End: 2024-12-23
Payer: COMMERCIAL

## 2024-12-23 DIAGNOSIS — M79.641 RIGHT HAND PAIN: ICD-10-CM

## 2024-12-23 DIAGNOSIS — M62.81 MUSCLE WEAKNESS: ICD-10-CM

## 2024-12-23 DIAGNOSIS — M25.641 STIFFNESS OF FINGER JOINT OF RIGHT HAND: ICD-10-CM

## 2024-12-23 DIAGNOSIS — S62.326G CLOSED DISPLACED FRACTURE OF SHAFT OF FIFTH METACARPAL BONE OF RIGHT HAND WITH DELAYED HEALING, SUBSEQUENT ENCOUNTER: Primary | ICD-10-CM

## 2024-12-23 DIAGNOSIS — M79.641 RIGHT HAND PAIN: Primary | ICD-10-CM

## 2024-12-23 PROCEDURE — 1160F RVW MEDS BY RX/DR IN RCRD: CPT | Mod: CPTII,S$GLB,,

## 2024-12-23 PROCEDURE — 97112 NEUROMUSCULAR REEDUCATION: CPT

## 2024-12-23 PROCEDURE — 73130 X-RAY EXAM OF HAND: CPT | Mod: TC,FY,RT

## 2024-12-23 PROCEDURE — 97110 THERAPEUTIC EXERCISES: CPT

## 2024-12-23 PROCEDURE — 99999 PR PBB SHADOW E&M-EST. PATIENT-LVL IV: CPT | Mod: PBBFAC,,,

## 2024-12-23 PROCEDURE — 99214 OFFICE O/P EST MOD 30 MIN: CPT | Mod: S$GLB,,,

## 2024-12-23 PROCEDURE — 73130 X-RAY EXAM OF HAND: CPT | Mod: 26,RT,, | Performed by: RADIOLOGY

## 2024-12-23 PROCEDURE — 97022 WHIRLPOOL THERAPY: CPT

## 2024-12-23 PROCEDURE — 1159F MED LIST DOCD IN RCRD: CPT | Mod: CPTII,S$GLB,,

## 2024-12-23 PROCEDURE — 97530 THERAPEUTIC ACTIVITIES: CPT

## 2024-12-23 RX ORDER — DICLOFENAC SODIUM 75 MG/1
75 TABLET, DELAYED RELEASE ORAL 2 TIMES DAILY
Qty: 30 TABLET | Refills: 0 | Status: SHIPPED | OUTPATIENT
Start: 2024-12-23
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
## 2024-12-26 ENCOUNTER — CLINICAL SUPPORT (OUTPATIENT)
Dept: REHABILITATION | Facility: HOSPITAL | Age: 39
End: 2024-12-26
Payer: COMMERCIAL

## 2024-12-26 DIAGNOSIS — M25.641 STIFFNESS OF FINGER JOINT OF RIGHT HAND: ICD-10-CM

## 2024-12-26 DIAGNOSIS — M62.81 MUSCLE WEAKNESS: ICD-10-CM

## 2024-12-26 DIAGNOSIS — M79.641 RIGHT HAND PAIN: Primary | ICD-10-CM

## 2024-12-26 PROCEDURE — 97110 THERAPEUTIC EXERCISES: CPT

## 2024-12-26 PROCEDURE — 97018 PARAFFIN BATH THERAPY: CPT

## 2024-12-26 PROCEDURE — 97140 MANUAL THERAPY 1/> REGIONS: CPT

## 2024-12-26 PROCEDURE — 97530 THERAPEUTIC ACTIVITIES: CPT
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
## 2024-12-30 ENCOUNTER — CLINICAL SUPPORT (OUTPATIENT)
Dept: REHABILITATION | Facility: HOSPITAL | Age: 39
End: 2024-12-30
Payer: COMMERCIAL

## 2024-12-30 DIAGNOSIS — M25.641 STIFFNESS OF FINGER JOINT OF RIGHT HAND: ICD-10-CM

## 2024-12-30 DIAGNOSIS — M79.641 RIGHT HAND PAIN: Primary | ICD-10-CM

## 2024-12-30 DIAGNOSIS — M62.81 MUSCLE WEAKNESS: ICD-10-CM

## 2024-12-30 PROCEDURE — 97022 WHIRLPOOL THERAPY: CPT

## 2024-12-30 PROCEDURE — 97530 THERAPEUTIC ACTIVITIES: CPT

## 2024-12-30 PROCEDURE — 97110 THERAPEUTIC EXERCISES: CPT

## 2024-12-30 PROCEDURE — 97140 MANUAL THERAPY 1/> REGIONS: CPT
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
## 2025-01-07 ENCOUNTER — OFFICE VISIT (OUTPATIENT)
Dept: PAIN MEDICINE | Facility: CLINIC | Age: 40
End: 2025-01-07
Payer: COMMERCIAL

## 2025-01-07 ENCOUNTER — CLINICAL SUPPORT (OUTPATIENT)
Dept: REHABILITATION | Facility: HOSPITAL | Age: 40
End: 2025-01-07
Payer: COMMERCIAL

## 2025-01-07 VITALS
WEIGHT: 209.44 LBS | HEART RATE: 82 BPM | TEMPERATURE: 98 F | HEIGHT: 66 IN | OXYGEN SATURATION: 100 % | SYSTOLIC BLOOD PRESSURE: 106 MMHG | RESPIRATION RATE: 18 BRPM | DIASTOLIC BLOOD PRESSURE: 92 MMHG | BODY MASS INDEX: 33.66 KG/M2

## 2025-01-07 DIAGNOSIS — S62.326S CLOSED DISPLACED FRACTURE OF SHAFT OF FIFTH METACARPAL BONE OF RIGHT HAND, SEQUELA: ICD-10-CM

## 2025-01-07 DIAGNOSIS — G56.41 CAUSALGIA OF UPPER LIMB, RIGHT: Primary | ICD-10-CM

## 2025-01-07 DIAGNOSIS — M79.641 RIGHT HAND PAIN: ICD-10-CM

## 2025-01-07 DIAGNOSIS — M25.641 STIFFNESS OF FINGER JOINT OF RIGHT HAND: ICD-10-CM

## 2025-01-07 DIAGNOSIS — M62.81 MUSCLE WEAKNESS: ICD-10-CM

## 2025-01-07 DIAGNOSIS — S62.326G CLOSED DISPLACED FRACTURE OF SHAFT OF FIFTH METACARPAL BONE OF RIGHT HAND WITH DELAYED HEALING, SUBSEQUENT ENCOUNTER: ICD-10-CM

## 2025-01-07 DIAGNOSIS — M79.641 RIGHT HAND PAIN: Primary | ICD-10-CM

## 2025-01-07 PROCEDURE — 3080F DIAST BP >= 90 MM HG: CPT | Mod: CPTII,S$GLB,, | Performed by: ANESTHESIOLOGY

## 2025-01-07 PROCEDURE — 97530 THERAPEUTIC ACTIVITIES: CPT | Mod: CO

## 2025-01-07 PROCEDURE — 97140 MANUAL THERAPY 1/> REGIONS: CPT | Mod: CO

## 2025-01-07 PROCEDURE — 97022 WHIRLPOOL THERAPY: CPT | Mod: CO

## 2025-01-07 PROCEDURE — 1159F MED LIST DOCD IN RCRD: CPT | Mod: CPTII,S$GLB,, | Performed by: ANESTHESIOLOGY

## 2025-01-07 PROCEDURE — 97110 THERAPEUTIC EXERCISES: CPT | Mod: CO

## 2025-01-07 PROCEDURE — 99999 PR PBB SHADOW E&M-EST. PATIENT-LVL V: CPT | Mod: PBBFAC,,, | Performed by: ANESTHESIOLOGY

## 2025-01-07 PROCEDURE — 3008F BODY MASS INDEX DOCD: CPT | Mod: CPTII,S$GLB,, | Performed by: ANESTHESIOLOGY

## 2025-01-07 PROCEDURE — 1160F RVW MEDS BY RX/DR IN RCRD: CPT | Mod: CPTII,S$GLB,, | Performed by: ANESTHESIOLOGY

## 2025-01-07 PROCEDURE — 3074F SYST BP LT 130 MM HG: CPT | Mod: CPTII,S$GLB,, | Performed by: ANESTHESIOLOGY

## 2025-01-07 PROCEDURE — 99204 OFFICE O/P NEW MOD 45 MIN: CPT | Mod: S$GLB,,, | Performed by: ANESTHESIOLOGY
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
## 2025-01-08 ENCOUNTER — CLINICAL SUPPORT (OUTPATIENT)
Dept: REHABILITATION | Facility: HOSPITAL | Age: 40
End: 2025-01-08
Payer: COMMERCIAL

## 2025-01-08 ENCOUNTER — PATIENT MESSAGE (OUTPATIENT)
Dept: PAIN MEDICINE | Facility: OTHER | Age: 40
End: 2025-01-08
Payer: COMMERCIAL

## 2025-01-08 DIAGNOSIS — G56.41 CAUSALGIA OF UPPER LIMB, RIGHT: Primary | ICD-10-CM

## 2025-01-08 DIAGNOSIS — M62.81 MUSCLE WEAKNESS: ICD-10-CM

## 2025-01-08 DIAGNOSIS — M79.641 RIGHT HAND PAIN: Primary | ICD-10-CM

## 2025-01-08 DIAGNOSIS — M25.641 STIFFNESS OF FINGER JOINT OF RIGHT HAND: ICD-10-CM

## 2025-01-08 PROCEDURE — 97110 THERAPEUTIC EXERCISES: CPT | Mod: CO

## 2025-01-08 PROCEDURE — 97140 MANUAL THERAPY 1/> REGIONS: CPT | Mod: CO

## 2025-01-08 PROCEDURE — 97530 THERAPEUTIC ACTIVITIES: CPT | Mod: CO

## 2025-01-08 PROCEDURE — 97022 WHIRLPOOL THERAPY: CPT | Mod: CO
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
## 2025-01-13 ENCOUNTER — CLINICAL SUPPORT (OUTPATIENT)
Dept: REHABILITATION | Facility: HOSPITAL | Age: 40
End: 2025-01-13
Payer: COMMERCIAL

## 2025-01-13 DIAGNOSIS — M25.641 STIFFNESS OF FINGER JOINT OF RIGHT HAND: ICD-10-CM

## 2025-01-13 DIAGNOSIS — M79.641 RIGHT HAND PAIN: Primary | ICD-10-CM

## 2025-01-13 DIAGNOSIS — M62.81 MUSCLE WEAKNESS: ICD-10-CM

## 2025-01-13 PROCEDURE — 97530 THERAPEUTIC ACTIVITIES: CPT | Mod: CO

## 2025-01-13 PROCEDURE — 97110 THERAPEUTIC EXERCISES: CPT | Mod: CO

## 2025-01-13 PROCEDURE — 97140 MANUAL THERAPY 1/> REGIONS: CPT | Mod: CO

## 2025-01-13 PROCEDURE — 97022 WHIRLPOOL THERAPY: CPT | Mod: CO
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
## 2025-01-14 ENCOUNTER — CLINICAL SUPPORT (OUTPATIENT)
Dept: REHABILITATION | Facility: HOSPITAL | Age: 40
End: 2025-01-14
Payer: COMMERCIAL

## 2025-01-14 DIAGNOSIS — M79.641 RIGHT HAND PAIN: Primary | ICD-10-CM

## 2025-01-14 DIAGNOSIS — M25.641 STIFFNESS OF FINGER JOINT OF RIGHT HAND: ICD-10-CM

## 2025-01-14 DIAGNOSIS — M62.81 MUSCLE WEAKNESS: ICD-10-CM

## 2025-01-14 PROCEDURE — 97110 THERAPEUTIC EXERCISES: CPT | Mod: CO

## 2025-01-14 PROCEDURE — 97022 WHIRLPOOL THERAPY: CPT | Mod: CO

## 2025-01-14 PROCEDURE — 97140 MANUAL THERAPY 1/> REGIONS: CPT | Mod: CO

## 2025-01-14 PROCEDURE — 97530 THERAPEUTIC ACTIVITIES: CPT | Mod: CO

## 2025-01-16 ENCOUNTER — CLINICAL SUPPORT (OUTPATIENT)
Dept: REHABILITATION | Facility: HOSPITAL | Age: 40
End: 2025-01-16
Payer: COMMERCIAL

## 2025-01-16 DIAGNOSIS — M25.641 STIFFNESS OF FINGER JOINT OF RIGHT HAND: ICD-10-CM

## 2025-01-16 DIAGNOSIS — M62.81 MUSCLE WEAKNESS: ICD-10-CM

## 2025-01-16 DIAGNOSIS — M79.641 RIGHT HAND PAIN: Primary | ICD-10-CM

## 2025-01-16 PROCEDURE — 97022 WHIRLPOOL THERAPY: CPT

## 2025-01-16 PROCEDURE — 97110 THERAPEUTIC EXERCISES: CPT
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
## 2025-01-21 ENCOUNTER — TELEPHONE (OUTPATIENT)
Dept: ORTHOPEDICS | Facility: CLINIC | Age: 40
End: 2025-01-21
Payer: COMMERCIAL
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
## 2025-01-28 ENCOUNTER — CLINICAL SUPPORT (OUTPATIENT)
Dept: REHABILITATION | Facility: HOSPITAL | Age: 40
End: 2025-01-28
Payer: COMMERCIAL

## 2025-01-28 DIAGNOSIS — M25.641 STIFFNESS OF FINGER JOINT OF RIGHT HAND: ICD-10-CM

## 2025-01-28 DIAGNOSIS — M79.641 RIGHT HAND PAIN: Primary | ICD-10-CM

## 2025-01-28 DIAGNOSIS — M62.81 MUSCLE WEAKNESS: ICD-10-CM

## 2025-01-28 PROCEDURE — 97022 WHIRLPOOL THERAPY: CPT | Mod: CO

## 2025-01-28 PROCEDURE — 97110 THERAPEUTIC EXERCISES: CPT | Mod: CO

## 2025-01-28 PROCEDURE — 97530 THERAPEUTIC ACTIVITIES: CPT | Mod: CO

## 2025-01-29 ENCOUNTER — CLINICAL SUPPORT (OUTPATIENT)
Dept: REHABILITATION | Facility: HOSPITAL | Age: 40
End: 2025-01-29
Payer: COMMERCIAL

## 2025-01-29 DIAGNOSIS — M79.641 RIGHT HAND PAIN: Primary | ICD-10-CM

## 2025-01-29 DIAGNOSIS — M25.641 STIFFNESS OF FINGER JOINT OF RIGHT HAND: ICD-10-CM

## 2025-01-29 DIAGNOSIS — M62.81 MUSCLE WEAKNESS: ICD-10-CM

## 2025-01-29 PROCEDURE — L3806 WHFO W/JOINT(S) CUSTOM FAB: HCPCS

## 2025-01-29 NOTE — PATIENT INSTRUCTIONS
[facility] Occupational Therapy  Orthosis Instructions and Proof of Delivery        Date: 1/29/2025  Name: Syed Lyon  MRN: [id number]  YOB: 1985  Referring Provider: Gina Alcantar PA-C  Diagnosis:  Closed nondisplaced fracture of shaft of fifth metacarpal bone of right hand, initial encounter       [facility] Level II Code and Description   Custom, FA-Based Static Progressive Flexion Orthosis    Orthosis Information  () Custom Fabricated                () Right                                                          () Static Progressive                      Orthosis Instructions  () Wear the orthosis 4 times a day for 60 minute sessions    Cleaning and Maintenance  Keep your orthosis away from any heat sources. Do not leave in your car.  Keep your orthosis away from pets.  You may clean your orthosis with cool water and soap or a mild cleanser.  Your orthosis may need adjustments due to changes in your medical condition (swelling, dressing size, additional surgery, etc.)  Monitor your skin color and integrity.  Do not try to adjust the orthosis on your own.     If you have any questions or concerns, please contact the therapy office at [phone].     I, Syed Lyon , have personally received the above described orthosis along with instructions on the wear, care, and precautions related to this orthosis. I understand that I am responsible for payment to [facility] of my deductible, coinsurance, or other portion of my charges not paid in full by my insurance plans, including any item that is not covered under the insurance plan.     Signature: _________________________________ Date: __________________    Therapist:

## 2025-01-29 NOTE — PROGRESS NOTES
[facility] Occupational Therapy  Orthosis Instructions and Proof of Delivery         Date: 1/29/2025  Name: Syed Lyon  MRN: [id number]  YOB: 1985  Referring Provider: Gina Alcantar PA-C  Diagnosis:  Closed nondisplaced fracture of shaft of fifth metacarpal bone of right hand, initial encounter     Orthosis Fabrication: Patient seen by OT this session for fabrication of orthosis Fabricated and applied    Custom, FA-Based Static Progressive Flexion Orthosis     Orthosis with noted to fit appropriately following fabrication. Pt was able to hallie/doff independently. Pt displayed good understanding of all instructions including wear/care/precautions of the orthosis.     Pt was instructed to wear the orthosis 4 times a day for 60 minute sessions    Patient was provided written instructions on orthosis purpose, wear schedule, care and precautions to monitor for increased pain/edema, pressure points, skin breakdown or redness/skin irritation Patient is to contact clinic for adjustments as needed.  Patient signed copy of orthosis instructions, acknowledging delivery and understanding of wear, care, and precautions     (see Media for scanned documents)    Goal of Orthosis: Increase flexion of R small finger to promote increase in functional grasp.

## 2025-01-30 ENCOUNTER — OFFICE VISIT (OUTPATIENT)
Dept: ORTHOPEDICS | Facility: CLINIC | Age: 40
End: 2025-01-30
Payer: COMMERCIAL

## 2025-01-30 ENCOUNTER — HOSPITAL ENCOUNTER (OUTPATIENT)
Dept: RADIOLOGY | Facility: HOSPITAL | Age: 40
Discharge: HOME | End: 2025-01-30
Payer: COMMERCIAL

## 2025-01-30 ENCOUNTER — CLINICAL SUPPORT (OUTPATIENT)
Dept: REHABILITATION | Facility: HOSPITAL | Age: 40
End: 2025-01-30
Payer: COMMERCIAL

## 2025-01-30 DIAGNOSIS — M79.641 RIGHT HAND PAIN: Primary | ICD-10-CM

## 2025-01-30 DIAGNOSIS — S62.326G CLOSED DISPLACED FRACTURE OF SHAFT OF FIFTH METACARPAL BONE OF RIGHT HAND WITH DELAYED HEALING, SUBSEQUENT ENCOUNTER: Primary | ICD-10-CM

## 2025-01-30 DIAGNOSIS — S62.326G CLOSED DISPLACED FRACTURE OF SHAFT OF FIFTH METACARPAL BONE OF RIGHT HAND WITH DELAYED HEALING, SUBSEQUENT ENCOUNTER: ICD-10-CM

## 2025-01-30 DIAGNOSIS — M62.81 MUSCLE WEAKNESS: ICD-10-CM

## 2025-01-30 DIAGNOSIS — M25.641 STIFFNESS OF FINGER JOINT OF RIGHT HAND: ICD-10-CM

## 2025-01-30 PROCEDURE — 99999 PR PBB SHADOW E&M-EST. PATIENT-LVL III: CPT | Mod: PBBFAC,,,

## 2025-01-30 PROCEDURE — 73130 X-RAY EXAM OF HAND: CPT | Mod: 26,RT,, | Performed by: RADIOLOGY

## 2025-01-30 PROCEDURE — 1159F MED LIST DOCD IN RCRD: CPT | Mod: CPTII,S$GLB,,

## 2025-01-30 PROCEDURE — 97022 WHIRLPOOL THERAPY: CPT

## 2025-01-30 PROCEDURE — 73130 X-RAY EXAM OF HAND: CPT | Mod: TC,RT

## 2025-01-30 PROCEDURE — 1160F RVW MEDS BY RX/DR IN RCRD: CPT | Mod: CPTII,S$GLB,,

## 2025-01-30 PROCEDURE — 99213 OFFICE O/P EST LOW 20 MIN: CPT | Mod: S$GLB,,,

## 2025-01-30 PROCEDURE — 97110 THERAPEUTIC EXERCISES: CPT

## 2025-01-30 PROCEDURE — 97763 ORTHC/PROSTC MGMT SBSQ ENC: CPT
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
## 2025-02-04 ENCOUNTER — CLINICAL SUPPORT (OUTPATIENT)
Dept: REHABILITATION | Facility: HOSPITAL | Age: 40
End: 2025-02-04
Payer: COMMERCIAL

## 2025-02-04 DIAGNOSIS — M25.641 STIFFNESS OF FINGER JOINT OF RIGHT HAND: ICD-10-CM

## 2025-02-04 DIAGNOSIS — M62.81 MUSCLE WEAKNESS: ICD-10-CM

## 2025-02-04 DIAGNOSIS — M79.641 RIGHT HAND PAIN: Primary | ICD-10-CM

## 2025-02-04 PROCEDURE — 97110 THERAPEUTIC EXERCISES: CPT | Mod: CO

## 2025-02-04 PROCEDURE — 97022 WHIRLPOOL THERAPY: CPT | Mod: CO

## 2025-02-04 PROCEDURE — 97530 THERAPEUTIC ACTIVITIES: CPT | Mod: CO

## 2025-02-05 ENCOUNTER — PATIENT MESSAGE (OUTPATIENT)
Dept: PAIN MEDICINE | Facility: OTHER | Age: 40
End: 2025-02-05
Payer: COMMERCIAL
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
## 2025-02-06 ENCOUNTER — CLINICAL SUPPORT (OUTPATIENT)
Dept: REHABILITATION | Facility: HOSPITAL | Age: 40
End: 2025-02-06
Payer: COMMERCIAL

## 2025-02-06 DIAGNOSIS — M62.81 MUSCLE WEAKNESS: ICD-10-CM

## 2025-02-06 DIAGNOSIS — M25.641 STIFFNESS OF FINGER JOINT OF RIGHT HAND: ICD-10-CM

## 2025-02-06 DIAGNOSIS — M79.641 RIGHT HAND PAIN: Primary | ICD-10-CM

## 2025-02-06 PROCEDURE — 97110 THERAPEUTIC EXERCISES: CPT | Mod: CO

## 2025-02-06 PROCEDURE — 97530 THERAPEUTIC ACTIVITIES: CPT | Mod: CO

## 2025-02-06 PROCEDURE — 97022 WHIRLPOOL THERAPY: CPT | Mod: CO

## 2025-02-07 ENCOUNTER — CLINICAL SUPPORT (OUTPATIENT)
Dept: REHABILITATION | Facility: HOSPITAL | Age: 40
End: 2025-02-07
Payer: COMMERCIAL

## 2025-02-07 ENCOUNTER — HOSPITAL ENCOUNTER (OUTPATIENT)
Facility: OTHER | Age: 40
Discharge: HOME | End: 2025-02-07
Attending: ANESTHESIOLOGY | Admitting: STUDENT IN AN ORGANIZED HEALTH CARE EDUCATION/TRAINING PROGRAM
Payer: COMMERCIAL

## 2025-02-07 VITALS
OXYGEN SATURATION: 99 % | BODY MASS INDEX: 33.75 KG/M2 | HEART RATE: 80 BPM | RESPIRATION RATE: 16 BRPM | TEMPERATURE: 98 F | DIASTOLIC BLOOD PRESSURE: 79 MMHG | WEIGHT: 210 LBS | SYSTOLIC BLOOD PRESSURE: 120 MMHG | HEIGHT: 66 IN

## 2025-02-07 DIAGNOSIS — G89.29 CHRONIC LEFT SHOULDER PAIN: Primary | ICD-10-CM

## 2025-02-07 DIAGNOSIS — G89.29 CHRONIC PAIN: ICD-10-CM

## 2025-02-07 DIAGNOSIS — M79.641 RIGHT HAND PAIN: Primary | ICD-10-CM

## 2025-02-07 DIAGNOSIS — M25.512 CHRONIC LEFT SHOULDER PAIN: Primary | ICD-10-CM

## 2025-02-07 DIAGNOSIS — M62.81 MUSCLE WEAKNESS: ICD-10-CM

## 2025-02-07 DIAGNOSIS — M25.641 STIFFNESS OF FINGER JOINT OF RIGHT HAND: ICD-10-CM

## 2025-02-07 PROCEDURE — 97110 THERAPEUTIC EXERCISES: CPT | Mod: CO

## 2025-02-07 PROCEDURE — 25000003 PHARM REV CODE 250: Performed by: ANESTHESIOLOGY

## 2025-02-07 PROCEDURE — 25500020 PHARM REV CODE 255: Performed by: ANESTHESIOLOGY

## 2025-02-07 PROCEDURE — A4216 STERILE WATER/SALINE, 10 ML: HCPCS | Performed by: ANESTHESIOLOGY

## 2025-02-07 PROCEDURE — 63600175 PHARM REV CODE 636 W HCPCS: Performed by: ANESTHESIOLOGY

## 2025-02-07 PROCEDURE — 97530 THERAPEUTIC ACTIVITIES: CPT | Mod: CO

## 2025-02-07 PROCEDURE — 99152 MOD SED SAME PHYS/QHP 5/>YRS: CPT | Performed by: ANESTHESIOLOGY

## 2025-02-07 PROCEDURE — 64510 N BLOCK STELLATE GANGLION: CPT | Mod: RT,,, | Performed by: ANESTHESIOLOGY

## 2025-02-07 PROCEDURE — 99152 MOD SED SAME PHYS/QHP 5/>YRS: CPT | Mod: ,,, | Performed by: ANESTHESIOLOGY

## 2025-02-07 PROCEDURE — 64510 N BLOCK STELLATE GANGLION: CPT | Mod: RT | Performed by: ANESTHESIOLOGY

## 2025-02-07 PROCEDURE — 97022 WHIRLPOOL THERAPY: CPT | Mod: CO

## 2025-02-07 RX ORDER — SODIUM CHLORIDE 9 MG/ML
INJECTION, SOLUTION INTRAMUSCULAR; INTRAVENOUS; SUBCUTANEOUS
Status: DISCONTINUED | OUTPATIENT
Start: 2025-02-07 | End: 2025-02-07 | Stop reason: HOSPADM

## 2025-02-07 RX ORDER — SODIUM CHLORIDE 9 MG/ML
INJECTION, SOLUTION INTRAVENOUS CONTINUOUS
Status: DISCONTINUED | OUTPATIENT
Start: 2025-02-07 | End: 2025-02-07 | Stop reason: HOSPADM

## 2025-02-07 RX ORDER — FENTANYL CITRATE 50 UG/ML
INJECTION, SOLUTION INTRAMUSCULAR; INTRAVENOUS
Status: DISCONTINUED | OUTPATIENT
Start: 2025-02-07 | End: 2025-02-07 | Stop reason: HOSPADM

## 2025-02-07 RX ORDER — MIDAZOLAM HYDROCHLORIDE 1 MG/ML
INJECTION INTRAMUSCULAR; INTRAVENOUS
Status: DISCONTINUED | OUTPATIENT
Start: 2025-02-07 | End: 2025-02-07 | Stop reason: HOSPADM

## 2025-02-07 RX ORDER — BUPIVACAINE HYDROCHLORIDE 2.5 MG/ML
INJECTION, SOLUTION EPIDURAL; INFILTRATION; INTRACAUDAL
Status: DISCONTINUED | OUTPATIENT
Start: 2025-02-07 | End: 2025-02-07 | Stop reason: HOSPADM

## 2025-02-07 RX ORDER — LIDOCAINE HYDROCHLORIDE 20 MG/ML
INJECTION, SOLUTION INFILTRATION; PERINEURAL
Status: DISCONTINUED | OUTPATIENT
Start: 2025-02-07 | End: 2025-02-07 | Stop reason: HOSPADM

## 2025-02-07 RX ORDER — DEXAMETHASONE SODIUM PHOSPHATE 10 MG/ML
INJECTION, SOLUTION INTRA-ARTICULAR; INTRALESIONAL; INTRAMUSCULAR; INTRAVENOUS; SOFT TISSUE
Status: DISCONTINUED | OUTPATIENT
Start: 2025-02-07 | End: 2025-02-07 | Stop reason: HOSPADM

## 2025-02-07 NOTE — DISCHARGE SUMMARY
Discharge Note  Short Stay      SUMMARY     Admit Date: 2/7/2025    Attending Physician: Mao Guerrier MD    Discharge Physician: Mao Guerrier MD      Discharge Date: 2/7/2025 10:10 AM    Procedure(s) (LRB):  BLOCK, NERVE RIGHT STELLATE GANGLION 1 OF 3 (Right)    Final Diagnosis:  1. Causalgia of upper limb, right   2. Chronic pain           Disposition: Home or self care    Patient Instructions:   Current Discharge Medication List        CONTINUE these medications which have NOT CHANGED    Details   acyclovir 5% (ZOVIRAX) 5 % ointment Apply topically every 3 (three) hours.  Qty: 15 g, Refills: 1      bacitracin 500 unit/gram Oint Apply topically once daily. Apply with bandage changes.  Qty: 113 g, Refills: 0      clobetasoL (TEMOVATE) 0.05 % cream Apply topically 2 (two) times daily.  Qty: 60 g, Refills: 2      diazePAM (VALIUM) 10 MG Tab       diclofenac (VOLTAREN) 75 MG EC tablet Take 1 tablet (75 mg total) by mouth 2 (two) times daily.  Qty: 30 tablet, Refills: 0      doxycycline (VIBRAMYCIN) 100 MG Cap Take 100 mg by mouth 2 (two) times daily.      EPINEPHrine (EPIPEN) 0.3 mg/0.3 mL AtIn       estradioL (ESTRACE) 2 MG tablet Take by mouth.      famotidine (PEPCID) 20 MG tablet Take 1 tablet (20 mg total) by mouth 2 (two) times daily. for 5 days  Qty: 10 tablet, Refills: 0      fluticasone propionate (FLONASE) 50 mcg/actuation nasal spray       ketoconazole (NIZORAL) 2 % cream Apply topically daily as needed.  Qty: 15 g, Refills: 2      LIDOcaine (XYLOCAINE) 5 % Oint ointment Apply to affected area as directed      mupirocin (BACTROBAN) 2 % ointment every other day. Apply to affected area      oxyCODONE (ROXICODONE) 5 MG immediate release tablet Take 1 tablet (5 mg total) by mouth every 8 (eight) hours as needed for Pain.  Qty: 15 tablet, Refills: 0    Comments: Quantity prescribed more than 7 day supply? No      SLYND 4 mg (28) Tab TAKE 1 TABLET BY MOUTH EVERY DAY  Qty: 28 tablet, Refills: 11       traMADoL (ULTRAM) 50 mg tablet Take 1 tablet (50 mg total) by mouth every 6 (six) hours.  Qty: 10 tablet, Refills: 0    Comments: Quantity prescribed more than 7 day supply? Yes, quantity medically necessary  Associated Diagnoses: Closed displaced fracture of shaft of fifth metacarpal bone of right hand with delayed healing, subsequent encounter      tretinoin (RETIN-A) 0.025 % cream Apply pea-sized amount to face nightly  Qty: 45 g, Refills: 2    Associated Diagnoses: Acne vulgaris      valACYclovir (VALTREX) 500 MG tablet TAKE 1 TABLET (500 MG TOTAL) BY MOUTH 2 (TWO) TIMES DAILY AS NEEDED (OUTBREAK).  Qty: 30 tablet, Refills: 1                 Discharge Diagnosis: Same as above  Condition on Discharge: Stable with no complications to procedure   Diet on Discharge: Same as before.  Activity: as per instruction sheet.  Discharge to: Home with a responsible adult.  Follow up: 2-4 weeks       Please call my office or pager at [phone] if experienced any weakness or loss of sensation, fever > 101.5, pain uncontrolled with oral medications, persistent nausea/vomiting/or diarrhea, redness or drainage from the incisions, or any other worrisome concerns. If physician on call was not reached or could not communicate with our office for any reason please go to the nearest emergency department     Georgette Shea MD

## 2025-02-07 NOTE — OP NOTE
Diagnostic/Therapeutic Right Stellate Ganglion Block under Fluoroscopic and Ultrasound Guidance    The procedure, risks, benefits, and options were discussed with the patient. There are no contraindications to the procedure. The patent expressed understanding and agreed to the procedure. Informed written consent was obtained prior to the start of the procedure and can be found in the patient's chart.    PATIENT NAME: Syed Lyon   MRN: [id number]     DATE OF PROCEDURE: 02/07/2025    PROCEDURE: Diagnostic/Therapeutic Right Stellate Block under Fluoroscopic Guidance    PRE-OP DIAGNOSIS: Causalgia of upper limb, right [G56.41]    POST-OP DIAGNOSIS: Same    PHYSICIAN: Mao Guerrier MD    ASSISTANTS: Georgette Shea MD  [facility]      MEDICATIONS INJECTED: Preservative-free Decadron 10mg with 9cc of Bupivacaine 0.25%     LOCAL ANESTHETIC INJECTED: Xylocaine 2%     SEDATION: Versed 2mg and Fentanyl 50mcg                                                                                                                                                                                     Conscious sedation ordered by MJORGE L. Patient re-evaluation prior to administration of conscious sedation. No changes noted in patient's status from initial evaluation. The patient's vital signs were monitored by RN and patient remained hemodynamically stable throughout the procedure.    Event Time In   Sedation Start 0930   Sedation End 0942       ESTIMATED BLOOD LOSS: None    COMPLICATIONS: None    TECHNIQUE: Time-out was performed to identify the patient and procedure to be performed. With the patient laying in a supine position, the surgical area was prepped and draped in the usual sterile fashion using ChloraPrep and a fenestrated drape.The area of C6 vertebral body was determined under fluoroscopy guidance. Skin anesthesia was achieved by injecting Lidocaine 2% over the injection site. Ultrasound guidance was used in order to  avoid the neurovascular bundle.  This was followed by advancement of a 21 gauge, 4 inch spinal quincke needle until contact with the transverse process at the level of C6 to reach above the longus coli muscle under ultrasound guidance. Once the final needle tip position was confirmed under ultrasound, negative pressure was applied to confirm no intravascular placement.10 mL of the medication mixture listed above was injected slowly. The needles were removed and bleeding was nil. A sterile dressing was applied. No specimens collected. The patient tolerated the procedure well.         The patient was monitored after the procedure in the recovery area. They were given post-procedure and discharge instructions to follow at home. The patient was discharged in a stable condition.      Georgette Shea MD      I reviewed and edited the fellow's note. I conducted my own interview and physical examination. I agree with the findings. I was present and supervising all critical portions of the procedure.    Mao Guerrier MD

## 2025-02-07 NOTE — H&P
HPI    Patient presenting for Procedure(s) (LRB):  BLOCK, NERVE RIGHT STELLATE GANGLION 1 OF 3 (Right)     Patient on Anti-coagulation No    No health changes since previous encounter    Past Medical History:   Diagnosis Date    Abnormal Pap smear of cervix     cryo yrs. ago, then 3- hpv positive, pap normal, 2017 colpo ECC normal,     Endometriosis     per dx lap Bad endo per pt    HPV (human papilloma virus) infection     Infertility, female         Oral contraceptive use     PID (pelvic inflammatory disease)      Past Surgical History:   Procedure Laterality Date     SECTION      COLONOSCOPY N/A 2017    Procedure: COLONOSCOPY;  Surgeon: Jose Zaidi MD;  Location: [facility] ENDO (4TH FLR);  Service: Endoscopy;  Laterality: N/A;    fallopian tube removal Bilateral     GYNECOLOGIC CRYOSURGERY      HYSTEROSCOPY      2015    In Vitro      Dr Anuja Santana    INJECTION OF BOTULINUM TOXIN TYPE A N/A 2022    Procedure: INJECTION, BOTULINUM TOXIN, TYPE A;  Surgeon: RADHA Bahena MD;  Location: [facility] OR 2ND FLR;  Service: Colon and Rectal;  Laterality: N/A;    PELVIC EXAMINATION UNDER ANESTHESIA N/A 2022    Procedure: EXAM UNDER ANESTHESIA, PELVIS;  Surgeon: RADHA Bahena MD;  Location: [facility] OR 2ND FLR;  Service: Colon and Rectal;  Laterality: N/A;     Review of patient's allergies indicates:   Allergen Reactions    Broccoli flower Hives, Itching and Swelling    Mold Hives, Itching and Swelling    Mustard Hives, Itching and Swelling    Shellfish containing products Hives, Itching and Swelling    Soy Hives, Itching and Swelling    Strawberry Hives, Itching and Swelling    Keflex [cephalexin] Hives and Rash      No current facility-administered medications for this encounter.     Facility-Administered Medications Ordered in Other Encounters   Medication    0.9%  NaCl infusion    LIDOcaine (PF) 10 mg/ml (1%) injection 10 mg    mupirocin 2 % ointment       PMHx,  PSHx, Allergies, Medications reviewed in epic    ROS negative except pain complaints in HPI    OBJECTIVE:    There were no vitals taken for this visit.    PHYSICAL EXAMINATION:    GENERAL: Well appearing, in no acute distress, alert and oriented x3.  PSYCH:  Mood and affect appropriate.  SKIN: Skin color, texture, turgor normal, no rashes or lesions which will impact the procedure.  CV: RRR with palpation of the radial artery.  PULM: No evidence of respiratory difficulty, symmetric chest rise. Clear to auscultation.  NEURO: Cranial nerves grossly intact.    Plan:    Proceed with procedure as planned Procedure(s) (LRB):  BLOCK, NERVE RIGHT STELLATE GANGLION 1 OF 3 (Right)    Georgette Shea  02/07/2025         Pdate

## 2025-02-07 NOTE — DISCHARGE INSTRUCTIONS
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
## 2025-02-10 ENCOUNTER — LAB VISIT (OUTPATIENT)
Dept: LAB | Facility: HOSPITAL | Age: 40
End: 2025-02-10
Attending: INTERNAL MEDICINE
Payer: COMMERCIAL

## 2025-02-10 ENCOUNTER — TELEPHONE (OUTPATIENT)
Dept: PAIN MEDICINE | Facility: CLINIC | Age: 40
End: 2025-02-10
Payer: COMMERCIAL

## 2025-02-10 ENCOUNTER — OFFICE VISIT (OUTPATIENT)
Dept: INTERNAL MEDICINE | Facility: CLINIC | Age: 40
End: 2025-02-10
Payer: COMMERCIAL

## 2025-02-10 DIAGNOSIS — Z82.49 FAMILY HISTORY OF CARDIAC DISORDER: ICD-10-CM

## 2025-02-10 DIAGNOSIS — D64.9 ANEMIA, UNSPECIFIED TYPE: ICD-10-CM

## 2025-02-10 DIAGNOSIS — F41.9 ANXIETY: ICD-10-CM

## 2025-02-10 DIAGNOSIS — D64.9 ANEMIA, UNSPECIFIED TYPE: Primary | ICD-10-CM

## 2025-02-10 LAB
ALBUMIN SERPL BCP-MCNC: 4.3 G/DL (ref 3.5–5.2)
ALP SERPL-CCNC: 44 U/L (ref 40–150)
ALT SERPL W/O P-5'-P-CCNC: 13 U/L (ref 10–44)
ANION GAP SERPL CALC-SCNC: 12 MMOL/L (ref 8–16)
AST SERPL-CCNC: 14 U/L (ref 10–40)
BASOPHILS # BLD AUTO: 0.03 K/UL (ref 0–0.2)
BASOPHILS NFR BLD: 0.5 % (ref 0–1.9)
BILIRUB SERPL-MCNC: 1 MG/DL (ref 0.1–1)
BUN SERPL-MCNC: 17 MG/DL (ref 6–20)
CALCIUM SERPL-MCNC: 9.5 MG/DL (ref 8.7–10.5)
CHLORIDE SERPL-SCNC: 107 MMOL/L (ref 95–110)
CO2 SERPL-SCNC: 22 MMOL/L (ref 23–29)
CREAT SERPL-MCNC: 0.8 MG/DL (ref 0.5–1.4)
DIFFERENTIAL METHOD BLD: ABNORMAL
EOSINOPHIL # BLD AUTO: 0.2 K/UL (ref 0–0.5)
EOSINOPHIL NFR BLD: 3.3 % (ref 0–8)
ERYTHROCYTE [DISTWIDTH] IN BLOOD BY AUTOMATED COUNT: 15.4 % (ref 11.5–14.5)
EST. GFR  (NO RACE VARIABLE): >60 ML/MIN/1.73 M^2
FERRITIN SERPL-MCNC: 47 NG/ML (ref 20–300)
GLUCOSE SERPL-MCNC: 86 MG/DL (ref 70–110)
HCT VFR BLD AUTO: 39.4 % (ref 37–48.5)
HGB BLD-MCNC: 12 G/DL (ref 12–16)
IMM GRANULOCYTES # BLD AUTO: 0.01 K/UL (ref 0–0.04)
IMM GRANULOCYTES NFR BLD AUTO: 0.2 % (ref 0–0.5)
LYMPHOCYTES # BLD AUTO: 1.9 K/UL (ref 1–4.8)
LYMPHOCYTES NFR BLD: 33.3 % (ref 18–48)
MCH RBC QN AUTO: 22.6 PG (ref 27–31)
MCHC RBC AUTO-ENTMCNC: 30.5 G/DL (ref 32–36)
MCV RBC AUTO: 74 FL (ref 82–98)
MONOCYTES # BLD AUTO: 0.5 K/UL (ref 0.3–1)
MONOCYTES NFR BLD: 9.1 % (ref 4–15)
NEUTROPHILS # BLD AUTO: 3.1 K/UL (ref 1.8–7.7)
NEUTROPHILS NFR BLD: 53.6 % (ref 38–73)
NRBC BLD-RTO: 0 /100 WBC
PLATELET # BLD AUTO: 248 K/UL (ref 150–450)
PMV BLD AUTO: 10.9 FL (ref 9.2–12.9)
POTASSIUM SERPL-SCNC: 4.1 MMOL/L (ref 3.5–5.1)
PROT SERPL-MCNC: 7.9 G/DL (ref 6–8.4)
RBC # BLD AUTO: 5.32 M/UL (ref 4–5.4)
SODIUM SERPL-SCNC: 141 MMOL/L (ref 136–145)
TSH SERPL DL<=0.005 MIU/L-ACNC: 0.49 UIU/ML (ref 0.4–4)
WBC # BLD AUTO: 5.73 K/UL (ref 3.9–12.7)

## 2025-02-10 PROCEDURE — 3079F DIAST BP 80-89 MM HG: CPT | Mod: CPTII,S$GLB,, | Performed by: INTERNAL MEDICINE

## 2025-02-10 PROCEDURE — 36415 COLL VENOUS BLD VENIPUNCTURE: CPT | Performed by: INTERNAL MEDICINE

## 2025-02-10 PROCEDURE — 84443 ASSAY THYROID STIM HORMONE: CPT | Performed by: INTERNAL MEDICINE

## 2025-02-10 PROCEDURE — 99999 PR PBB SHADOW E&M-EST. PATIENT-LVL V: CPT | Mod: PBBFAC,,, | Performed by: INTERNAL MEDICINE

## 2025-02-10 PROCEDURE — 82728 ASSAY OF FERRITIN: CPT | Performed by: INTERNAL MEDICINE

## 2025-02-10 PROCEDURE — 85025 COMPLETE CBC W/AUTO DIFF WBC: CPT | Performed by: INTERNAL MEDICINE

## 2025-02-10 PROCEDURE — 99213 OFFICE O/P EST LOW 20 MIN: CPT | Mod: S$GLB,,, | Performed by: INTERNAL MEDICINE

## 2025-02-10 PROCEDURE — 3074F SYST BP LT 130 MM HG: CPT | Mod: CPTII,S$GLB,, | Performed by: INTERNAL MEDICINE

## 2025-02-10 PROCEDURE — 80053 COMPREHEN METABOLIC PANEL: CPT | Performed by: INTERNAL MEDICINE

## 2025-02-10 PROCEDURE — 3008F BODY MASS INDEX DOCD: CPT | Mod: CPTII,S$GLB,, | Performed by: INTERNAL MEDICINE

## 2025-02-10 RX ORDER — TRAZODONE HYDROCHLORIDE 50 MG/1
50 TABLET ORAL NIGHTLY PRN
Qty: 30 TABLET | Refills: 2 | Status: SHIPPED | OUTPATIENT
Start: 2025-02-10 | End: 2026-02-10

## 2025-02-10 NOTE — TELEPHONE ENCOUNTER
Medial Branch Block Diary   BLOCK, NERVE RIGHT STELLATE GANGLION 1 OF 3     1 of 3    Pre procedure pain level 10  Percentage of relief within 24 hours of procedure- 50%  Post procedure level 5  Current pain level:5  Any Improvements in ADL: bathing, dressing, eating, transferring, using toilet, and walking

## 2025-02-11 ENCOUNTER — CLINICAL SUPPORT (OUTPATIENT)
Dept: REHABILITATION | Facility: HOSPITAL | Age: 40
End: 2025-02-11
Payer: COMMERCIAL

## 2025-02-11 DIAGNOSIS — M62.81 MUSCLE WEAKNESS: ICD-10-CM

## 2025-02-11 DIAGNOSIS — M25.641 STIFFNESS OF FINGER JOINT OF RIGHT HAND: ICD-10-CM

## 2025-02-11 DIAGNOSIS — M79.641 RIGHT HAND PAIN: Primary | ICD-10-CM

## 2025-02-11 PROCEDURE — 97022 WHIRLPOOL THERAPY: CPT | Mod: CO

## 2025-02-11 PROCEDURE — 97530 THERAPEUTIC ACTIVITIES: CPT | Mod: CO

## 2025-02-11 PROCEDURE — 97110 THERAPEUTIC EXERCISES: CPT | Mod: CO

## 2025-02-11 NOTE — PROGRESS NOTES
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 She also had temporary hoarseness. She reported 80% pain relief after the procedure.     Mao Guerrier MD
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
## 2025-02-13 ENCOUNTER — PATIENT MESSAGE (OUTPATIENT)
Dept: REHABILITATION | Facility: HOSPITAL | Age: 40
End: 2025-02-13

## 2025-02-13 ENCOUNTER — CLINICAL SUPPORT (OUTPATIENT)
Dept: REHABILITATION | Facility: HOSPITAL | Age: 40
End: 2025-02-13
Payer: COMMERCIAL

## 2025-02-13 VITALS
SYSTOLIC BLOOD PRESSURE: 120 MMHG | BODY MASS INDEX: 32.66 KG/M2 | TEMPERATURE: 99 F | HEART RATE: 62 BPM | WEIGHT: 203.25 LBS | DIASTOLIC BLOOD PRESSURE: 86 MMHG | HEIGHT: 66 IN

## 2025-02-13 DIAGNOSIS — M25.641 STIFFNESS OF FINGER JOINT OF RIGHT HAND: ICD-10-CM

## 2025-02-13 DIAGNOSIS — M79.641 RIGHT HAND PAIN: Primary | ICD-10-CM

## 2025-02-13 DIAGNOSIS — M62.81 MUSCLE WEAKNESS: ICD-10-CM

## 2025-02-13 PROCEDURE — 97110 THERAPEUTIC EXERCISES: CPT

## 2025-02-13 PROCEDURE — 97140 MANUAL THERAPY 1/> REGIONS: CPT

## 2025-02-13 PROCEDURE — 97022 WHIRLPOOL THERAPY: CPT

## 2025-02-13 PROCEDURE — 97530 THERAPEUTIC ACTIVITIES: CPT
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
## 2025-02-13 NOTE — PROGRESS NOTES
Subjective:       Patient ID: Syed Lyon is a 39 y.o. female.    Chief Complaint: Anemia    HPI  She has  anemia.  Denies blood in stool.  She is also having issues with anxiety.  Concerned because of recent family member who had a cardiac event     Past medical history: Iron-deficiency anemia     Medications: Birth control pills     Allergies: Keflex       Review of Systems   Constitutional:  Negative for chills, fatigue, fever and unexpected weight change.   Respiratory:  Negative for chest tightness and shortness of breath.    Cardiovascular:  Negative for chest pain and palpitations.   Gastrointestinal:  Negative for abdominal pain and blood in stool.   Neurological:  Negative for dizziness, syncope, numbness and headaches.       Objective:      Physical Exam  HENT:      Right Ear: External ear normal.      Left Ear: External ear normal.      Nose: Nose normal.      Mouth/Throat:      Mouth: Mucous membranes are moist.      Pharynx: Oropharynx is clear.   Eyes:      Pupils: Pupils are equal, round, and reactive to light.   Cardiovascular:      Rate and Rhythm: Normal rate and regular rhythm.      Heart sounds: No murmur heard.  Pulmonary:      Breath sounds: Normal breath sounds.   Abdominal:      General: There is no distension.      Palpations: There is no hepatomegaly or splenomegaly.      Tenderness: There is no abdominal tenderness.   Musculoskeletal:      Cervical back: Normal range of motion.   Lymphadenopathy:      Cervical: No cervical adenopathy.      Upper Body:      Right upper body: No axillary adenopathy.      Left upper body: No axillary adenopathy.   Neurological:      Cranial Nerves: No cranial nerve deficit.      Sensory: No sensory deficit.      Motor: Motor function is intact.      Deep Tendon Reflexes: Reflexes are normal and symmetric.       Family member with heart disease: Jelly Fenton   54  Assessment/Plan       Assessment and plan: 1.  Anemia:  Check CMP, CBC, ferritin    2. Anxiety: Schedule psychiatry appointment   3. Family history of heart disease: Schedule cardiology appointment  4.  She was here for urgent care only appointment.  She will return to clinic for a physical

## 2025-02-14 ENCOUNTER — TELEPHONE (OUTPATIENT)
Dept: PAIN MEDICINE | Facility: CLINIC | Age: 40
End: 2025-02-14
Payer: COMMERCIAL

## 2025-02-14 ENCOUNTER — HOSPITAL ENCOUNTER (OUTPATIENT)
Facility: OTHER | Age: 40
Discharge: HOME | End: 2025-02-14
Attending: ANESTHESIOLOGY | Admitting: ANESTHESIOLOGY
Payer: COMMERCIAL

## 2025-02-14 VITALS
WEIGHT: 203 LBS | TEMPERATURE: 98 F | RESPIRATION RATE: 16 BRPM | HEART RATE: 70 BPM | SYSTOLIC BLOOD PRESSURE: 123 MMHG | BODY MASS INDEX: 32.62 KG/M2 | HEIGHT: 66 IN | OXYGEN SATURATION: 98 % | DIASTOLIC BLOOD PRESSURE: 64 MMHG

## 2025-02-14 DIAGNOSIS — G90.511 COMPLEX REGIONAL PAIN SYNDROME TYPE 1 OF RIGHT UPPER EXTREMITY: Primary | ICD-10-CM

## 2025-02-14 DIAGNOSIS — G89.29 CHRONIC PAIN: ICD-10-CM

## 2025-02-14 PROCEDURE — 63600175 PHARM REV CODE 636 W HCPCS: Performed by: ANESTHESIOLOGY

## 2025-02-14 PROCEDURE — 64510 N BLOCK STELLATE GANGLION: CPT | Mod: RT | Performed by: ANESTHESIOLOGY

## 2025-02-14 RX ORDER — LIDOCAINE HYDROCHLORIDE 20 MG/ML
INJECTION, SOLUTION INFILTRATION; PERINEURAL
Status: DISCONTINUED | OUTPATIENT
Start: 2025-02-14 | End: 2025-02-14 | Stop reason: HOSPADM

## 2025-02-14 RX ORDER — FENTANYL CITRATE 50 UG/ML
INJECTION, SOLUTION INTRAMUSCULAR; INTRAVENOUS
Status: DISCONTINUED | OUTPATIENT
Start: 2025-02-14 | End: 2025-02-14 | Stop reason: HOSPADM

## 2025-02-14 RX ORDER — MIDAZOLAM HYDROCHLORIDE 1 MG/ML
INJECTION INTRAMUSCULAR; INTRAVENOUS
Status: DISCONTINUED | OUTPATIENT
Start: 2025-02-14 | End: 2025-02-14 | Stop reason: HOSPADM

## 2025-02-14 RX ORDER — BUPIVACAINE HYDROCHLORIDE 2.5 MG/ML
INJECTION, SOLUTION EPIDURAL; INFILTRATION; INTRACAUDAL
Status: DISCONTINUED | OUTPATIENT
Start: 2025-02-14 | End: 2025-02-14 | Stop reason: HOSPADM

## 2025-02-14 RX ORDER — SODIUM CHLORIDE 9 MG/ML
INJECTION, SOLUTION INTRAVENOUS CONTINUOUS
Status: DISCONTINUED | OUTPATIENT
Start: 2025-02-14 | End: 2025-02-14 | Stop reason: HOSPADM

## 2025-02-14 RX ORDER — DEXAMETHASONE SODIUM PHOSPHATE 10 MG/ML
INJECTION, SOLUTION INTRA-ARTICULAR; INTRALESIONAL; INTRAMUSCULAR; INTRAVENOUS; SOFT TISSUE
Status: DISCONTINUED | OUTPATIENT
Start: 2025-02-14 | End: 2025-02-14 | Stop reason: HOSPADM

## 2025-02-14 NOTE — OP NOTE
Diagnostic/Therapeutic Right Stellate Ganglion Block under Fluoroscopic Guidance    The procedure, risks, benefits, and options were discussed with the patient. There are no contraindications to the procedure. The patent expressed understanding and agreed to the procedure. Informed written consent was obtained prior to the start of the procedure and can be found in the patient's chart.    PATIENT NAME: Syed Lyon   MRN: [id number]     DATE OF PROCEDURE: 02/14/2025    PROCEDURE: Diagnostic/Therapeutic Right Stellate Block under Fluoroscopic Guidance    PRE-OP DIAGNOSIS: Causalgia of upper limb, right [G56.41]    POST-OP DIAGNOSIS: Same    PHYSICIAN: Mao Guerrier MD    ASSISTANTS: [facility]    MEDICATIONS INJECTED: Preservative-free Decadron 10mg with 9cc of Bupivacaine 0.25%     LOCAL ANESTHETIC INJECTED: Xylocaine 2%     SEDATION: Versed 2mg and Fentanyl 50mcg                                                                                                                                                                                     Conscious sedation ordered by M.D. Patient re-evaluation prior to administration of conscious sedation. No changes noted in patient's status from initial evaluation. The patient's vital signs were monitored by RN and patient remained hemodynamically stable throughout the procedure.    Event Time In   Sedation Start 0853   Sedation End 0902       ESTIMATED BLOOD LOSS: None    COMPLICATIONS: None    TECHNIQUE: Time-out was performed to identify the patient and procedure to be performed. With the patient laying in a supine position, the surgical area was prepped and draped in the usual sterile fashion using ChloraPrep and a fenestrated drape.The area was determined under fluoroscopy guidance. Skin anesthesia was achieved by injecting Lidocaine 2% over the injection site. Ultrasound guidance was used in order to avoid the neurovascular bundle.  This was  followed by advancement of a 21 gauge, 4 inch stimuplex needle until contact with the transverse process at the level of C6 while retracting the carotid artery laterally. Once the final needle tip position was confirmed on fluoroscopy, negative pressure was applied to confirm no intravascular placement.  10 mL of the medication mixture listed above was injected slowly. The needles were removed and bleeding was nil. A sterile dressing was applied. No specimens collected. The patient tolerated the procedure well.     The patient was monitored after the procedure in the recovery area. They were given post-procedure and discharge instructions to follow at home. The patient was discharged in a stable condition.    Robby Zuñiga MD     I reviewed and edited the fellow's note. I conducted my own interview and physical examination. I agree with the findings. I was present and supervising all critical portions of the procedure.     Mao Guerrier MD

## 2025-02-14 NOTE — TELEPHONE ENCOUNTER
Staff contact patient to further assist in call back request. Patient stated she missed a call from the procedure department inquiring if she should go to physical therapy after her procedure. Staff contacted pain management procedure department to gain more information, lizandro stated that staff is awaiting the doctor to complete his procedure to get confirmation and they will return the patient call .

## 2025-02-14 NOTE — DISCHARGE INSTRUCTIONS

## 2025-02-14 NOTE — DISCHARGE SUMMARY
Discharge Note  Short Stay      SUMMARY     Admit Date: 2/14/2025    Attending Physician: Mao Guerrier MD    Discharge Physician: Mao Guerrier MD      Discharge Date: 2/14/2025 9:03 AM    Procedure(s) (LRB):  BLOCK, NERVE RIGHT STELLATE GAMGLION 2 OF 3 (Right)    Final Diagnosis: Causalgia of upper limb, right [G56.41]    Disposition: Home or self care    Patient Instructions:   Current Discharge Medication List        CONTINUE these medications which have NOT CHANGED    Details   acyclovir 5% (ZOVIRAX) 5 % ointment Apply topically every 3 (three) hours.  Qty: 15 g, Refills: 1      bacitracin 500 unit/gram Oint Apply topically once daily. Apply with bandage changes.  Qty: 113 g, Refills: 0      clobetasoL (TEMOVATE) 0.05 % cream Apply topically 2 (two) times daily.  Qty: 60 g, Refills: 2      diazePAM (VALIUM) 10 MG Tab       diclofenac (VOLTAREN) 75 MG EC tablet Take 1 tablet (75 mg total) by mouth 2 (two) times daily.  Qty: 30 tablet, Refills: 0      EPINEPHrine (EPIPEN) 0.3 mg/0.3 mL AtIn       estradioL (ESTRACE) 2 MG tablet Take by mouth.      famotidine (PEPCID) 20 MG tablet Take 1 tablet (20 mg total) by mouth 2 (two) times daily. for 5 days  Qty: 10 tablet, Refills: 0      fluticasone propionate (FLONASE) 50 mcg/actuation nasal spray       ketoconazole (NIZORAL) 2 % cream Apply topically daily as needed.  Qty: 15 g, Refills: 2      LIDOcaine (XYLOCAINE) 5 % Oint ointment Apply to affected area as directed      mupirocin (BACTROBAN) 2 % ointment every other day. Apply to affected area      oxyCODONE (ROXICODONE) 5 MG immediate release tablet Take 1 tablet (5 mg total) by mouth every 8 (eight) hours as needed for Pain.  Qty: 15 tablet, Refills: 0    Comments: Quantity prescribed more than 7 day supply? No      SLYND 4 mg (28) Tab TAKE 1 TABLET BY MOUTH EVERY DAY  Qty: 28 tablet, Refills: 11      traMADoL (ULTRAM) 50 mg tablet Take 1 tablet (50 mg total) by mouth every 6 (six) hours.  Qty: 10 tablet,  Refills: 0    Comments: Quantity prescribed more than 7 day supply? Yes, quantity medically necessary  Associated Diagnoses: Closed displaced fracture of shaft of fifth metacarpal bone of right hand with delayed healing, subsequent encounter      traZODone (DESYREL) 50 MG tablet Take 1 tablet (50 mg total) by mouth nightly as needed for Insomnia.  Qty: 30 tablet, Refills: 2      tretinoin (RETIN-A) 0.025 % cream Apply pea-sized amount to face nightly  Qty: 45 g, Refills: 2    Associated Diagnoses: Acne vulgaris      valACYclovir (VALTREX) 500 MG tablet TAKE 1 TABLET (500 MG TOTAL) BY MOUTH 2 (TWO) TIMES DAILY AS NEEDED (OUTBREAK).  Qty: 30 tablet, Refills: 1                 Discharge Diagnosis: Causalgia of upper limb, right [G56.41]  Condition on Discharge: Stable with no complications to procedure   Diet on Discharge: Same as before.  Activity: as per instruction sheet.  Discharge to: Home with a responsible adult.  Follow up: 2-4 weeks       Please call my office or pager at [phone] if experienced any weakness or loss of sensation, fever > 101.5, pain uncontrolled with oral medications, persistent nausea/vomiting/or diarrhea, redness or drainage from the incisions, or any other worrisome concerns. If physician on call was not reached or could not communicate with our office for any reason please go to the nearest emergency department     Robby Zuñiga MD

## 2025-02-14 NOTE — TELEPHONE ENCOUNTER
----- Message from Alden sent at 2/14/2025  9:50 AM CST -----  Type:  Patient Returning Call    Who Called:     Who Left Message for Patient:     Does the patient know what this is regarding?: missed call     Best Call Back Number:   [phone]    Additional Information:

## 2025-02-14 NOTE — H&P
HPI  Patient presenting for Procedure(s) (LRB):  BLOCK, NERVE RIGHT STELLATE GAMGLION 2 OF 3 (Right)     Patient on Anti-coagulation No    No health changes since previous encounter    Past Medical History:   Diagnosis Date    Abnormal Pap smear of cervix     cryo yrs. ago, then 3- hpv positive, pap normal, 2017 colpo ECC normal,     Endometriosis     per dx lap Bad endo per pt    HPV (human papilloma virus) infection     Infertility, female         Oral contraceptive use     PID (pelvic inflammatory disease)      Past Surgical History:   Procedure Laterality Date     SECTION      COLONOSCOPY N/A 2017    Procedure: COLONOSCOPY;  Surgeon: Jose Zaidi MD;  Location: [facility] ENDO (4TH FLR);  Service: Endoscopy;  Laterality: N/A;    fallopian tube removal Bilateral     GYNECOLOGIC CRYOSURGERY      HYSTEROSCOPY      2015    In Vitro      Dr Anuja Santana    INJECTION OF ANESTHETIC AGENT AROUND NERVE Right 2025    Procedure: BLOCK, NERVE RIGHT STELLATE GANGLION 1 OF 3;  Surgeon: Mao Guerrier MD;  Location: [facility] MGT;  Service: Pain Management;  Laterality: Right;  *1 WK APART    INJECTION OF BOTULINUM TOXIN TYPE A N/A 2022    Procedure: INJECTION, BOTULINUM TOXIN, TYPE A;  Surgeon: RADHA Bahena MD;  Location: [facility] OR 2ND FLR;  Service: Colon and Rectal;  Laterality: N/A;    PELVIC EXAMINATION UNDER ANESTHESIA N/A 2022    Procedure: EXAM UNDER ANESTHESIA, PELVIS;  Surgeon: RADHA Bahena MD;  Location: [facility] OR 2ND FLR;  Service: Colon and Rectal;  Laterality: N/A;     Review of patient's allergies indicates:   Allergen Reactions    Broccoli flower Hives, Itching and Swelling    Mold Hives, Itching and Swelling    Mustard Hives, Itching and Swelling    Shellfish containing products Hives, Itching and Swelling    Soy Hives, Itching and Swelling    Strawberry Hives, Itching and Swelling    Keflex [cephalexin] Hives and Rash      No current  facility-administered medications for this encounter.     Facility-Administered Medications Ordered in Other Encounters   Medication    0.9%  NaCl infusion    LIDOcaine (PF) 10 mg/ml (1%) injection 10 mg    mupirocin 2 % ointment       PMHx, PSHx, Allergies, Medications reviewed in epic    ROS negative except pain complaints in HPI    OBJECTIVE:    There were no vitals taken for this visit.    PHYSICAL EXAMINATION:    GENERAL: Well appearing, in no acute distress, alert and oriented x3.  PSYCH:  Mood and affect appropriate.  SKIN: Skin color, texture, turgor normal, no rashes or lesions which will impact the procedure.  CV: RRR with palpation of the radial artery.  PULM: No evidence of respiratory difficulty, symmetric chest rise. Clear to auscultation.  NEURO: Cranial nerves grossly intact.    Plan:    Proceed with procedure as planned Procedure(s) (LRB):  BLOCK, NERVE RIGHT STELLATE GAMGLION 2 OF 3 (Right)    Franck Plummer  02/14/2025

## 2025-02-18 ENCOUNTER — TELEPHONE (OUTPATIENT)
Dept: PAIN MEDICINE | Facility: CLINIC | Age: 40
End: 2025-02-18
Payer: COMMERCIAL

## 2025-02-18 ENCOUNTER — CLINICAL SUPPORT (OUTPATIENT)
Dept: REHABILITATION | Facility: HOSPITAL | Age: 40
End: 2025-02-18
Payer: COMMERCIAL

## 2025-02-18 ENCOUNTER — OFFICE VISIT (OUTPATIENT)
Dept: INTERNAL MEDICINE | Facility: CLINIC | Age: 40
End: 2025-02-18
Payer: COMMERCIAL

## 2025-02-18 ENCOUNTER — TELEPHONE (OUTPATIENT)
Dept: INTERNAL MEDICINE | Facility: CLINIC | Age: 40
End: 2025-02-18

## 2025-02-18 DIAGNOSIS — F41.9 ANXIETY: Primary | ICD-10-CM

## 2025-02-18 DIAGNOSIS — M62.81 MUSCLE WEAKNESS: ICD-10-CM

## 2025-02-18 DIAGNOSIS — Z01.419 WELL WOMAN EXAM: Primary | ICD-10-CM

## 2025-02-18 DIAGNOSIS — G56.41 COMPLEX REGIONAL PAIN SYNDROME TYPE 2 OF RIGHT UPPER EXTREMITY: Primary | ICD-10-CM

## 2025-02-18 DIAGNOSIS — M25.641 STIFFNESS OF FINGER JOINT OF RIGHT HAND: ICD-10-CM

## 2025-02-18 DIAGNOSIS — M79.641 RIGHT HAND PAIN: Primary | ICD-10-CM

## 2025-02-18 PROCEDURE — 97530 THERAPEUTIC ACTIVITIES: CPT

## 2025-02-18 PROCEDURE — 97022 WHIRLPOOL THERAPY: CPT

## 2025-02-18 PROCEDURE — 97140 MANUAL THERAPY 1/> REGIONS: CPT

## 2025-02-18 PROCEDURE — 97110 THERAPEUTIC EXERCISES: CPT
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
## 2025-02-18 NOTE — TELEPHONE ENCOUNTER
Ganglion Nerve Block    3    Pre procedure pain level: 8  Percentage of relief within 24 hours of procedure: 85%  Post procedure level: 0  Current pain level: 3  Any Improvements in ADL: bathing, dressing, eating, transferring, using toilet, and walking

## 2025-02-18 NOTE — TELEPHONE ENCOUNTER
----- Message from Mundo sent at 2/18/2025  9:27 AM CST -----  Regarding: Pain Diary  Pain Diary PatientHour 1   Pain- 0Hour 2   Pain- 0 Hour 3   Pain- 0 Hour 4   Pain -0 Hour 5  Pain- 0  Hour 6    Pain- 0Hour 12 Pain- 0Hour 24 Pain- 3Additional Information :  Patient did receive 85% relief from the procedure.

## 2025-02-18 NOTE — TELEPHONE ENCOUNTER
Please contact patient and remind her it is time for her to see gynecology. Order is in, please schedule

## 2025-02-18 NOTE — TELEPHONE ENCOUNTER
Staff left detailed voicemail for patient to return call with pain level and current pain level for pre-service authorization.

## 2025-02-19 ENCOUNTER — PATIENT MESSAGE (OUTPATIENT)
Dept: PAIN MEDICINE | Facility: OTHER | Age: 40
End: 2025-02-19
Payer: COMMERCIAL

## 2025-02-20 ENCOUNTER — CLINICAL SUPPORT (OUTPATIENT)
Dept: REHABILITATION | Facility: HOSPITAL | Age: 40
End: 2025-02-20
Payer: COMMERCIAL

## 2025-02-20 ENCOUNTER — PATIENT MESSAGE (OUTPATIENT)
Dept: PAIN MEDICINE | Facility: OTHER | Age: 40
End: 2025-02-20
Payer: COMMERCIAL

## 2025-02-20 DIAGNOSIS — M25.641 STIFFNESS OF FINGER JOINT OF RIGHT HAND: ICD-10-CM

## 2025-02-20 DIAGNOSIS — M62.81 MUSCLE WEAKNESS: ICD-10-CM

## 2025-02-20 DIAGNOSIS — M79.641 RIGHT HAND PAIN: Primary | ICD-10-CM

## 2025-02-20 PROCEDURE — 97110 THERAPEUTIC EXERCISES: CPT | Mod: CO

## 2025-02-20 PROCEDURE — 97530 THERAPEUTIC ACTIVITIES: CPT | Mod: CO

## 2025-02-20 PROCEDURE — 97022 WHIRLPOOL THERAPY: CPT | Mod: CO
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
## 2025-02-21 VITALS
BODY MASS INDEX: 32.53 KG/M2 | TEMPERATURE: 99 F | SYSTOLIC BLOOD PRESSURE: 126 MMHG | HEIGHT: 66 IN | DIASTOLIC BLOOD PRESSURE: 68 MMHG | WEIGHT: 202.38 LBS | HEART RATE: 87 BPM

## 2025-02-21 NOTE — PROGRESS NOTES
Subjective:       Patient ID: Syed Lyon is a 39 y.o. female.    Chief Complaint: Anxiety    HPI  She is having issues with anxiety  Review of Systems   Constitutional:  Negative for chills, fatigue, fever and unexpected weight change.   Respiratory:  Negative for chest tightness and shortness of breath.    Cardiovascular:  Negative for chest pain and palpitations.   Gastrointestinal:  Negative for abdominal pain and blood in stool.   Neurological:  Negative for dizziness, syncope, numbness and headaches.       Objective:      Physical Exam  HENT:      Right Ear: External ear normal.      Left Ear: External ear normal.      Nose: Nose normal.      Mouth/Throat:      Mouth: Mucous membranes are moist.      Pharynx: Oropharynx is clear.   Eyes:      Pupils: Pupils are equal, round, and reactive to light.   Cardiovascular:      Rate and Rhythm: Normal rate and regular rhythm.      Heart sounds: No murmur heard.  Pulmonary:      Breath sounds: Normal breath sounds.   Abdominal:      General: There is no distension.      Palpations: There is no hepatomegaly or splenomegaly.      Tenderness: There is no abdominal tenderness.   Musculoskeletal:      Cervical back: Normal range of motion.   Lymphadenopathy:      Cervical: No cervical adenopathy.      Upper Body:      Right upper body: No axillary adenopathy.      Left upper body: No axillary adenopathy.   Neurological:      Cranial Nerves: No cranial nerve deficit.      Sensory: No sensory deficit.      Motor: Motor function is intact.      Deep Tendon Reflexes: Reflexes are normal and symmetric.         Assessment/Plan       Anxiety: Counseling provided

## 2025-02-24 ENCOUNTER — CLINICAL SUPPORT (OUTPATIENT)
Dept: REHABILITATION | Facility: HOSPITAL | Age: 40
End: 2025-02-24
Payer: COMMERCIAL

## 2025-02-24 DIAGNOSIS — M25.641 STIFFNESS OF FINGER JOINT OF RIGHT HAND: ICD-10-CM

## 2025-02-24 DIAGNOSIS — M79.641 RIGHT HAND PAIN: Primary | ICD-10-CM

## 2025-02-24 DIAGNOSIS — M62.81 MUSCLE WEAKNESS: ICD-10-CM

## 2025-02-24 PROCEDURE — 97530 THERAPEUTIC ACTIVITIES: CPT

## 2025-02-24 PROCEDURE — 97022 WHIRLPOOL THERAPY: CPT

## 2025-02-24 PROCEDURE — 97763 ORTHC/PROSTC MGMT SBSQ ENC: CPT

## 2025-02-24 PROCEDURE — 97110 THERAPEUTIC EXERCISES: CPT
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
## 2025-02-25 ENCOUNTER — CLINICAL SUPPORT (OUTPATIENT)
Dept: REHABILITATION | Facility: HOSPITAL | Age: 40
End: 2025-02-25
Payer: COMMERCIAL

## 2025-02-25 DIAGNOSIS — M79.641 RIGHT HAND PAIN: Primary | ICD-10-CM

## 2025-02-25 DIAGNOSIS — M25.641 STIFFNESS OF FINGER JOINT OF RIGHT HAND: ICD-10-CM

## 2025-02-25 DIAGNOSIS — M62.81 MUSCLE WEAKNESS: ICD-10-CM

## 2025-02-25 PROCEDURE — 97110 THERAPEUTIC EXERCISES: CPT | Mod: CO

## 2025-02-25 PROCEDURE — 97022 WHIRLPOOL THERAPY: CPT | Mod: CO

## 2025-02-25 PROCEDURE — 97530 THERAPEUTIC ACTIVITIES: CPT | Mod: CO
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
## 2025-02-27 ENCOUNTER — CLINICAL SUPPORT (OUTPATIENT)
Dept: REHABILITATION | Facility: HOSPITAL | Age: 40
End: 2025-02-27
Payer: COMMERCIAL

## 2025-02-27 DIAGNOSIS — M62.81 MUSCLE WEAKNESS: ICD-10-CM

## 2025-02-27 DIAGNOSIS — M79.641 RIGHT HAND PAIN: Primary | ICD-10-CM

## 2025-02-27 DIAGNOSIS — M25.641 STIFFNESS OF FINGER JOINT OF RIGHT HAND: ICD-10-CM

## 2025-02-27 PROCEDURE — 97022 WHIRLPOOL THERAPY: CPT | Mod: CO

## 2025-02-27 PROCEDURE — 97530 THERAPEUTIC ACTIVITIES: CPT | Mod: CO

## 2025-02-27 PROCEDURE — 97110 THERAPEUTIC EXERCISES: CPT | Mod: CO
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
## 2025-03-03 ENCOUNTER — CLINICAL SUPPORT (OUTPATIENT)
Dept: REHABILITATION | Facility: HOSPITAL | Age: 40
End: 2025-03-03
Payer: COMMERCIAL

## 2025-03-03 DIAGNOSIS — M62.81 MUSCLE WEAKNESS: ICD-10-CM

## 2025-03-03 DIAGNOSIS — M25.641 STIFFNESS OF FINGER JOINT OF RIGHT HAND: ICD-10-CM

## 2025-03-03 DIAGNOSIS — M79.641 RIGHT HAND PAIN: Primary | ICD-10-CM

## 2025-03-03 PROCEDURE — 97110 THERAPEUTIC EXERCISES: CPT

## 2025-03-03 PROCEDURE — 97018 PARAFFIN BATH THERAPY: CPT

## 2025-03-03 PROCEDURE — 97530 THERAPEUTIC ACTIVITIES: CPT

## 2025-03-03 NOTE — PROGRESS NOTES
Outpatient Rehab    Occupational Therapy Progress Note : Updated Plan of Care    Patient Name: Syed Lyon  MRN: [id number]  YOB: 1985  Encounter Date: 3/3/2025    Therapy Diagnosis:   Encounter Diagnoses   Name Primary?    Right hand pain Yes    Muscle weakness     Stiffness of finger joint of right hand      Physician: Gina Alcantar PA-C    Physician Orders: Eval and Treat  Medical Diagnosis: S62.356A (ICD-10-CM) - Closed nondisplaced fracture of shaft of fifth metacarpal bone of right hand, initial encounter   Surgical Procedure and Date: Not for this co    Visit # / Visits Authorized:  19 / 20   Date of Evaluation:  2/4/2025  Insurance Authorization Period: 1/1/2025 to 12/31/2025  Plan of Care Certification:  2/26/2025 to 4/14/2025      Time In: 1500   Time Out: 1605  Total Time: 65   Total Billable Time: 55    FOTO:  Intake Score: 38%  Survey Score 1: 38 (12/18/2025)%  Survey Score 2:  %         Subjective   Syed reports continued progress and continued compliance with HEP and wear of static progressive flexion orthosis..    Not rated today.    Objective      Wrist Range of Motion  Right Wrist   Active (deg) Passive (deg) Pain Comment   Flexion 78         Extension 74         Radial Deviation           Ulnar Deviation                      Digit 4 - Ring Finger Active Range of Motion  Right Ring Finger   Extension (deg) Flexion (deg) Pain   MCP 0 68     PIP 0 100     DIP 0 57     HOWELL: 225      Digit 5 - Little Finger Active Range of Motion  Right Little Finger   Extension (deg) Flexion (deg) Pain   MCP 0 57     PIP -5 111     DIP 0 55     HOWELL: 218                        Right  Strength  Right Hand Dynamometer Position: 2  Elbow Position Forearm Position Trial 1 (lbs) Trial 2  (lbs) Trial 3  (lbs) Average  (lbs) Pain   Flexed Neutral 39               Left  Strength  Left Hand Dynamometer Position: 2  Elbow Position Forearm Position Trial 1 (lbs) Trial 2 (lbs) Trial 3 (lbs) Average  (lbs) Pain   Flexed Neutral 42                 /Pinch Details  Above  strengths are the result of an average of 3 trials using and electronic dynamometer.          Treatment:  Therapeutic Exercise  Therapeutic Exercise Activity 1: - Gentle PROM: LLPS Ring and Small FIngers: MP flex, Straight Fist, Hook, Full Fist 20+ reps ea  Therapeutic Exercise Activity 2: - Gentle AAROM: LLPS Ring and Small FIngers: wave, Hook,  Hook to Full Fist, Wave to Full Fist 10+ reps ea  Therapeutic Exercise Activity 3: - wrist ROM 3 ways 2#  3 x 10  Therapeutic Exercise Activity 4: reassessment of arom and  strength    Therapeutic Activity  Therapeutic Activity 1: - green flexbar WF/WE, frowns/smiles x 20 ea  Therapeutic Activity 2: - red web pulls  x 20 ea  Therapeutic Activity 3: - red web weight bearng through extende hand/wrist x 20 ea  Therapeutic Activity 4: -dynamic, sustained gripping, moving wooden pegs, with calibrated gripper, silver springs, silver spring in rung 2 x 3 sets         Modalities  Paraffin Bath: Patient received paraffin bath with ring and small fingers taped in flexion and wrist rested in extension, with moist hot pack,for to increase blood flow, circulation, pain management and for tissue elasticity prior to therex whie providing LLPS..     Assessment & Plan   Plan  From an occupational therapy perspective, the patient would benefit from: Skilled Rehab Services    Planned therapy interventions include: Therapeutic activities, Neuromuscular re-education, Therapeutic exercise, Manual therapy, and Orthotic management and training.    Planned modalities to include: Fluidotherapy and Paraffin bath.        Visit Frequency: 2 times Per Week for 6 Weeks.       This plan was discussed with Patient.   Discussion participants: Agreed Upon Plan of Care             Patient will continue to benefit from skilled outpatient occupational therapy to address the deficits listed in the problem list box on initial  evaluation, provide pt/family education and to maximize pt's level of independence in the home and community environment.     Patient's spiritual, cultural, and educational needs considered and patient agreeable to plan of care and goals.          Goals:   Active       Long Term Goals       Pt will report a pain level of 2 out of 10 with ADLs/IADLs        Start:  03/06/25    Expected End:  04/17/25            Pt will demo improved FOTO score by 40 points.        Start:  03/06/25    Expected End:  04/17/25            Pt will return to prior level of function for ADLs /IADLs        Start:  03/06/25    Expected End:  04/17/25            PT will show 20 degree increase in MP flex to improve grasp.during ADLs/IADLs.        Start:  03/06/25    Expected End:  04/17/25                Ifeoma Johnson OT

## 2025-03-10 ENCOUNTER — CLINICAL SUPPORT (OUTPATIENT)
Dept: REHABILITATION | Facility: HOSPITAL | Age: 40
End: 2025-03-10
Payer: COMMERCIAL

## 2025-03-10 DIAGNOSIS — M62.81 MUSCLE WEAKNESS: ICD-10-CM

## 2025-03-10 DIAGNOSIS — M79.641 RIGHT HAND PAIN: Primary | ICD-10-CM

## 2025-03-10 DIAGNOSIS — M25.641 STIFFNESS OF FINGER JOINT OF RIGHT HAND: ICD-10-CM

## 2025-03-10 PROCEDURE — 97018 PARAFFIN BATH THERAPY: CPT

## 2025-03-10 PROCEDURE — 97110 THERAPEUTIC EXERCISES: CPT

## 2025-03-10 PROCEDURE — 97530 THERAPEUTIC ACTIVITIES: CPT

## 2025-03-10 NOTE — PROGRESS NOTES
Outpatient Rehab    Occupational Therapy Visit    Patient Name: Syed Lyon  MRN: [id number]  YOB: 1985  Encounter Date: 3/10/2025    Therapy Diagnosis:   Encounter Diagnoses   Name Primary?    Right hand pain Yes    Stiffness of finger joint of right hand     Muscle weakness      Physician: Gina Alcantar PA-C    Physician Orders: Eval and Treat  Medical Diagnosis: S62.356A (ICD-10-CM) - Closed nondisplaced fracture of shaft of fifth metacarpal bone of right hand, initial encounter   Surgical Procedure and Date: Not for this co     Visit # / Visits Authorized:  20/ 20   Date of Evaluation:  2/4/2025  Insurance Authorization Period: 1/1/2025 to 12/31/2025  Plan of Care Certification:  2/26/2025 to 4/14/2025                    Time In: 0811   Time Out: 0858  Total Time: 47   Total Billable Time: 45    FOTO:  Intake Score: 38%  Survey Score 1: 38 (12/18/2025)  Survey Score 2:  %         Subjective   Syed reports continued stiffness..    Not rated today.    Objective           Treatment:  Therapeutic Exercise  Therapeutic Exercise Activity 1: -PROM: LLPS Ring and Small FIngers: MP flex, Straight Fist,  Full Fist 20+ reps each with MP joint mobs and glides at times  Therapeutic Exercise Activity 2: AAROM: LLPS Ring and Small FIngers: wave, Hook,  Hook to Full Fist, Wave to Full Fist 10+ reps ea    Therapeutic Activity  Therapeutic Activity 1: - green flexbar WF/WE, frowns/smiles x 20 ea  Therapeutic Activity 2: - blue web pulls  x 20 ea  Therapeutic Activity 3: - blue web weight bearng through extende hand/wrist x 20 ea              Modalities  Paraffin Bath: Patient received paraffin bath with ring and small fingers taped in flexion and wrist rested in extension, with moist hot pack,for to increase blood flow, circulation, pain management and for tissue elasticity prior to therex whie providing LLPS..     Assessment & Plan   Assessment: Syed tolerated session with effort. She shortened her  nails and this helps a lot when addressing the composite finger flexion deficits.       Patient will continue to benefit from skilled outpatient occupational therapy to address the deficits listed in the problem list box on initial evaluation, provide pt/family education and to maximize pt's level of independence in the home and community environment.     Patient's spiritual, cultural, and educational needs considered and patient agreeable to plan of care and goals.           Plan: Continue with POC. Reassess rom, , and FOTO next session.    Goals:   Active       Long Term Goals       Pt will report a pain level of 2 out of 10 with ADLs/IADLs        Start:  03/06/25    Expected End:  04/17/25            Pt will demo improved FOTO score by 40 points.        Start:  03/06/25    Expected End:  04/17/25            Pt will return to prior level of function for ADLs /IADLs        Start:  03/06/25    Expected End:  04/17/25            PT will show 20 degree increase in MP flex to improve grasp.during ADLs/IADLs.        Start:  03/06/25    Expected End:  04/17/25                Ifeoma Johnson OT

## 2025-03-11 ENCOUNTER — OFFICE VISIT (OUTPATIENT)
Dept: PSYCHIATRY | Facility: CLINIC | Age: 40
End: 2025-03-11
Payer: COMMERCIAL

## 2025-03-11 VITALS
HEART RATE: 94 BPM | WEIGHT: 202.19 LBS | DIASTOLIC BLOOD PRESSURE: 69 MMHG | SYSTOLIC BLOOD PRESSURE: 116 MMHG | BODY MASS INDEX: 32.63 KG/M2

## 2025-03-11 DIAGNOSIS — F41.9 ANXIETY: ICD-10-CM

## 2025-03-11 DIAGNOSIS — F41.1 GENERALIZED ANXIETY DISORDER: Primary | ICD-10-CM

## 2025-03-11 PROCEDURE — 3078F DIAST BP <80 MM HG: CPT | Mod: CPTII,S$GLB,, | Performed by: FAMILY MEDICINE

## 2025-03-11 PROCEDURE — 3008F BODY MASS INDEX DOCD: CPT | Mod: CPTII,S$GLB,, | Performed by: FAMILY MEDICINE

## 2025-03-11 PROCEDURE — 99999 PR PBB SHADOW E&M-EST. PATIENT-LVL II: CPT | Mod: PBBFAC,,, | Performed by: FAMILY MEDICINE

## 2025-03-11 PROCEDURE — 3074F SYST BP LT 130 MM HG: CPT | Mod: CPTII,S$GLB,, | Performed by: FAMILY MEDICINE

## 2025-03-11 PROCEDURE — 99205 OFFICE O/P NEW HI 60 MIN: CPT | Mod: S$GLB,,, | Performed by: FAMILY MEDICINE

## 2025-03-11 RX ORDER — ESCITALOPRAM OXALATE 10 MG/1
10 TABLET ORAL DAILY
Qty: 30 TABLET | Refills: 0 | Status: SHIPPED | OUTPATIENT
Start: 2025-03-11 | End: 2025-04-10
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
## 2025-03-12 ENCOUNTER — CLINICAL SUPPORT (OUTPATIENT)
Dept: REHABILITATION | Facility: HOSPITAL | Age: 40
End: 2025-03-12
Payer: COMMERCIAL

## 2025-03-12 DIAGNOSIS — M25.641 STIFFNESS OF FINGER JOINT OF RIGHT HAND: ICD-10-CM

## 2025-03-12 DIAGNOSIS — M62.81 MUSCLE WEAKNESS: ICD-10-CM

## 2025-03-12 DIAGNOSIS — M79.641 RIGHT HAND PAIN: Primary | ICD-10-CM

## 2025-03-12 PROCEDURE — 97018 PARAFFIN BATH THERAPY: CPT

## 2025-03-12 PROCEDURE — 97530 THERAPEUTIC ACTIVITIES: CPT

## 2025-03-12 PROCEDURE — 97110 THERAPEUTIC EXERCISES: CPT

## 2025-03-12 NOTE — PROGRESS NOTES
Outpatient Rehab    Occupational Therapy Visit    Patient Name: Syed Lyon  MRN: [id number]  YOB: 1985  Encounter Date: 3/12/2025    Therapy Diagnosis:   Encounter Diagnoses   Name Primary?    Right hand pain Yes    Stiffness of finger joint of right hand     Muscle weakness      Physician: Gina Alcantar PA-C    Physician Orders: Eval and Treat  Medical Diagnosis: S62.356A (ICD-10-CM) - Closed nondisplaced fracture of shaft of fifth metacarpal bone of right hand, initial encounter   Surgical Procedure and Date: Not for this co     Visit # / Visits Authorized:  19 / 20   Date of Evaluation:  2/4/2025  Insurance Authorization Period: 1/1/2025 to 12/31/2025  Plan of Care Certification:  2/26/2025 to 4/14/2025      Time In: 0800   Time Out: 0900  Total Time: 60   Total Billable Time: 55    FOTO:  Intake Score:  %  Survey Score 1:  %  Survey Score 2:  %         Subjective   Ganea compliance with HEP and wear of static progressive wrist orthosis..    Not rated today.    Objective           Treatment:  Therapeutic Exercise  Therapeutic Exercise Activity 1: -PROM: LLPS Ring and Small FIngers: MP flex x 30+ reps, Straight Fist,  Full Fist 20+ reps each with MP joint mobs and glides at times  Therapeutic Exercise Activity 2: AAROM: LLPS Ring and Small FIngers: wave, Hook,  Hook to Full Fist, Wave to Full Fist 15+ reps ea    Therapeutic Activity  Therapeutic Activity 1: - green flexbar WF/WE, frowns/smiles x 20 ea  Therapeutic Activity 2: - blue web pulls  x 20 ea  Therapeutic Activity 3: - blue web weight bearng through extende hand/wrist x 20 ea  Therapeutic Activity 4: -dynamic, sustained gripping, moving wooden pegs, with calibrated gripper, silver springs,black spring in rung 2 x 5 min                         Assessment & Plan   Assessment:    Evaluation/Treatment Tolerance: Patient tolerated treatment well    Patient will continue to benefit from skilled outpatient occupational therapy to address  the deficits listed in the problem list box on initial evaluation, provide pt/family education and to maximize pt's level of independence in the home and community environment.     Patient's spiritual, cultural, and educational needs considered and patient agreeable to plan of care and goals.           Plan: Continue with POC.    Goals:   Active       Long Term Goals       Pt will report a pain level of 2 out of 10 with ADLs/IADLs        Start:  03/06/25    Expected End:  04/17/25            Pt will demo improved FOTO score by 40 points.        Start:  03/06/25    Expected End:  04/17/25            Pt will return to prior level of function for ADLs /IADLs        Start:  03/06/25    Expected End:  04/17/25            PT will show 20 degree increase in MP flex to improve grasp.during ADLs/IADLs.        Start:  03/06/25    Expected End:  04/17/25                Ifeoma Johnson OT

## 2025-03-13 ENCOUNTER — CLINICAL SUPPORT (OUTPATIENT)
Dept: REHABILITATION | Facility: HOSPITAL | Age: 40
End: 2025-03-13
Payer: COMMERCIAL

## 2025-03-13 ENCOUNTER — OFFICE VISIT (OUTPATIENT)
Dept: CARDIOLOGY | Facility: CLINIC | Age: 40
End: 2025-03-13
Payer: COMMERCIAL

## 2025-03-13 ENCOUNTER — LAB VISIT (OUTPATIENT)
Dept: LAB | Facility: HOSPITAL | Age: 40
End: 2025-03-13
Payer: COMMERCIAL

## 2025-03-13 VITALS
SYSTOLIC BLOOD PRESSURE: 128 MMHG | WEIGHT: 202.81 LBS | HEIGHT: 66 IN | DIASTOLIC BLOOD PRESSURE: 70 MMHG | BODY MASS INDEX: 32.59 KG/M2 | HEART RATE: 71 BPM

## 2025-03-13 DIAGNOSIS — M25.641 STIFFNESS OF FINGER JOINT OF RIGHT HAND: ICD-10-CM

## 2025-03-13 DIAGNOSIS — M79.641 RIGHT HAND PAIN: Primary | ICD-10-CM

## 2025-03-13 DIAGNOSIS — M62.81 MUSCLE WEAKNESS: ICD-10-CM

## 2025-03-13 DIAGNOSIS — E66.812 OBESITY, CLASS II, BMI 35-39.9, NO COMORBIDITY: Primary | ICD-10-CM

## 2025-03-13 DIAGNOSIS — Z82.49 FAMILY HISTORY OF CARDIAC DISORDER: ICD-10-CM

## 2025-03-13 DIAGNOSIS — E66.812 OBESITY, CLASS II, BMI 35-39.9, NO COMORBIDITY: ICD-10-CM

## 2025-03-13 LAB — CRP SERPL-MCNC: 0.44 MG/L (ref 0–3.19)

## 2025-03-13 PROCEDURE — 99214 OFFICE O/P EST MOD 30 MIN: CPT | Mod: S$GLB,,, | Performed by: STUDENT IN AN ORGANIZED HEALTH CARE EDUCATION/TRAINING PROGRAM

## 2025-03-13 PROCEDURE — 3074F SYST BP LT 130 MM HG: CPT | Mod: CPTII,S$GLB,, | Performed by: STUDENT IN AN ORGANIZED HEALTH CARE EDUCATION/TRAINING PROGRAM

## 2025-03-13 PROCEDURE — 97110 THERAPEUTIC EXERCISES: CPT

## 2025-03-13 PROCEDURE — 86141 C-REACTIVE PROTEIN HS: CPT | Performed by: STUDENT IN AN ORGANIZED HEALTH CARE EDUCATION/TRAINING PROGRAM

## 2025-03-13 PROCEDURE — 83695 ASSAY OF LIPOPROTEIN(A): CPT | Performed by: STUDENT IN AN ORGANIZED HEALTH CARE EDUCATION/TRAINING PROGRAM

## 2025-03-13 PROCEDURE — 99999 PR PBB SHADOW E&M-EST. PATIENT-LVL IV: CPT | Mod: PBBFAC,,, | Performed by: STUDENT IN AN ORGANIZED HEALTH CARE EDUCATION/TRAINING PROGRAM

## 2025-03-13 PROCEDURE — 97018 PARAFFIN BATH THERAPY: CPT

## 2025-03-13 PROCEDURE — 3008F BODY MASS INDEX DOCD: CPT | Mod: CPTII,S$GLB,, | Performed by: STUDENT IN AN ORGANIZED HEALTH CARE EDUCATION/TRAINING PROGRAM

## 2025-03-13 PROCEDURE — 36415 COLL VENOUS BLD VENIPUNCTURE: CPT | Performed by: STUDENT IN AN ORGANIZED HEALTH CARE EDUCATION/TRAINING PROGRAM

## 2025-03-13 PROCEDURE — 3078F DIAST BP <80 MM HG: CPT | Mod: CPTII,S$GLB,, | Performed by: STUDENT IN AN ORGANIZED HEALTH CARE EDUCATION/TRAINING PROGRAM

## 2025-03-13 NOTE — PROGRESS NOTES
"  Subjective     Chief Complaint:  Establish care    History of Present Illness:  Ms. Syed Lyon is a 39 y.o. female past medical history of endometriosis with infertility and irregular cycles,  with pregnancy complicated by preeclampsia requiring , obesity, family history of early heart disease (uncle with MI in his 50s) who presents as a self-referral to be screened for heart disease.  Patient's mother, who was known to our clinic, recently passed from a cardiac arrest while at home.  Unclear etiology.  Appears she was being treated for heart failure and did not have established coronary artery disease.  Due to this patient requested to be screened for heart disease.  She has no known cardiac history. Denies orthopnea, LE edema, worsening MOODY, chest discomfort, palpitations, lightheadedness, and syncope.     She is an 8-year-old child at home.  Ob/gyn history of endometriosis with infertility and irregular cycles.   with pregnancy complicated by preeclampsia requiring . She is contemplating having another child soon    2024 C 161, HDL 62, LDL 81, TG 88  CT of chest in  w contrast but no obvious Ca  EKG 2017 normal with narrow QRS  No echo, cath or stress in epic       PAST HISTORY:     Past Medical History:   Diagnosis Date    Abnormal Pap smear of cervix     cryo yrs. ago, then 3- hpv positive, pap normal, 2017 colpo ECC normal,     Endometriosis     per dx lap Bad endo per pt    HPV (human papilloma virus) infection     Infertility, female         Oral contraceptive use     PID (pelvic inflammatory disease)        Cardiac History   No results found for this or any previous visit.    No results found for: "EF"  No results found for this or any previous visit.      MEDICATIONS:     Current Outpatient Medications on File Prior to Visit   Medication Sig    diazePAM (VALIUM) 10 MG Tab     EScitalopram oxalate (LEXAPRO) 10 MG tablet Take 1 tablet (10 mg total) by " "mouth once daily.    fluticasone propionate (FLONASE) 50 mcg/actuation nasal spray     acyclovir 5% (ZOVIRAX) 5 % ointment Apply topically every 3 (three) hours.    bacitracin 500 unit/gram Oint Apply topically once daily. Apply with bandage changes.    clobetasoL (TEMOVATE) 0.05 % cream Apply topically 2 (two) times daily.    EPINEPHrine (EPIPEN) 0.3 mg/0.3 mL AtIn     famotidine (PEPCID) 20 MG tablet Take 1 tablet (20 mg total) by mouth 2 (two) times daily. for 5 days    ketoconazole (NIZORAL) 2 % cream Apply topically daily as needed.    LIDOcaine (XYLOCAINE) 5 % Oint ointment Apply to affected area as directed    mupirocin (BACTROBAN) 2 % ointment every other day. Apply to affected area    valACYclovir (VALTREX) 500 MG tablet TAKE 1 TABLET (500 MG TOTAL) BY MOUTH 2 (TWO) TIMES DAILY AS NEEDED (OUTBREAK).     Current Facility-Administered Medications on File Prior to Visit   Medication    0.9%  NaCl infusion    LIDOcaine (PF) 10 mg/ml (1%) injection 10 mg    mupirocin 2 % ointment       SUBJECTIVE:     Review of Systems   Respiratory:  Negative for shortness of breath.    Cardiovascular:  Negative for chest pain, palpitations, orthopnea, claudication, leg swelling and PND.        OBJECTIVE:     Vital Signs:  Vitals:    03/13/25 0943   BP: 128/70   BP Location: Right arm   Patient Position: Sitting   Pulse: 71   Weight: 92 kg (202 lb 13.2 oz)   Height: 5' 6" (1.676 m)     Body mass index is 32.74 kg/m².     Physical Exam  HENT:      Head: Normocephalic and atraumatic.   Eyes:      Conjunctiva/sclera: Conjunctivae normal.   Neck:      Comments: No JVD at 30°  Cardiovascular:      Rate and Rhythm: Normal rate and regular rhythm.      Heart sounds: Normal heart sounds.   Pulmonary:      Effort: Pulmonary effort is normal. No respiratory distress.      Breath sounds: Normal breath sounds. No wheezing.   Abdominal:      General: There is no distension.      Palpations: Abdomen is soft.      Tenderness: There is no " "abdominal tenderness.   Musculoskeletal:      Cervical back: Normal range of motion.      Right lower leg: No edema.      Left lower leg: No edema.   Neurological:      Mental Status: She is alert and oriented to person, place, and time.   Psychiatric:         Mood and Affect: Affect normal.         Laboratory  Lab Results   Component Value Date    WBC 5.73 02/10/2025    HGB 12.0 02/10/2025    HCT 39.4 02/10/2025    MCV 74 (L) 02/10/2025     02/10/2025     BMP  Lab Results   Component Value Date     02/10/2025    K 4.1 02/10/2025     02/10/2025    CO2 22 (L) 02/10/2025    BUN 17 02/10/2025    CREATININE 0.8 02/10/2025    CALCIUM 9.5 02/10/2025    ANIONGAP 12 02/10/2025    EGFRNORACEVR >60.0 02/10/2025     Lab Results   Component Value Date    INR 1.1 10/01/2016     No results found for: "HGBA1C"  No results for input(s): "POCTGLUCOSE" in the last 72 hours.    Diagnostic Results:  Labs: Reviewed  ECG: Reviewed  CT: Reviewed    ASSESSMENT & PLAN:     Establish care  Coronary artery disease screening  Patient here after her mother recently  cardiac arrest at home from unknown cause and with unknown rhythm  Risk factors include family history of early coronary artery disease, obesity, preeclampsia, endometriosis with irregular cycles  Given her risk factors for CAD, offered coronary artery calcium score; however, after discussion with shared decision-making we will wait to obtain coronary artery calcium score after 40 years of age (typically negative before this age).  We will rediscuss on next visit in 1 year  Discussed red yeast rice supplementation for lipid control to reduce risk factors however she prefers to not be on any additional supplements or medications at this time  Additionally decides to become pregnant, she is high-risk for preeclampsia and also given this history she is high-risk to develop hypertension under long-term.  If she does become pregnant, she will need close blood " pressure monitoring throughout pregnancy and in the peripartum.  Encouraged low-salt diet, weight loss, exercise  We will follow-up in 1 year to reassess the above      Discussed with Dr. Ritter  - staff attestation to follow      Jose Stewart MD  Cardiovascular Disease Fellow PGY-6

## 2025-03-13 NOTE — PROGRESS NOTES
Outpatient Rehab    Occupational Therapy Visit    Patient Name: Syed Lyon  MRN: [id number]  YOB: 1985  Encounter Date: 3/13/2025    Therapy Diagnosis:   Encounter Diagnoses   Name Primary?    Right hand pain Yes    Stiffness of finger joint of right hand     Muscle weakness      Physician: Gina Alcantar PA-C    Physician Orders: Eval and Treat  Medical Diagnosis: S62.356A (ICD-10-CM) - Closed nondisplaced fracture of shaft of fifth metacarpal bone of right hand, initial encounter   Surgical Procedure and Date: Not for this co     Visit # / Visits Authorized:  19 / 20   Date of Evaluation:  2/4/2025  Insurance Authorization Period: 1/1/2025 to 12/31/2025  Plan of Care Certification:  2/26/2025 to 4/14/2025   Time In: 0808   Time Out: 0856  Total Time: 48   Total Billable Time: 48    FOTO:  Intake Score: 38%  Survey Score 1: 38%  Survey Score 2: 71%         Subjective   Nothing new reported today..         Objective           Treatment:  Therapeutic Exercise  Therapeutic Exercise Activity 1: -PROM: LLPS Ring and Small FIngers: MP flex x 20+ reps, Straight Fist,  Full Fist 15+ reps each with MP joint mobs and glides at times  Therapeutic Exercise Activity 2: AAROM: LLPS Ring and Small FIngers: wave, Hook,  Hook to Full Fist, Wave to Full Fist 10+ reps ea    Therapeutic Activity  Therapeutic Activity 1: - green flexbar WF/WE, frowns/smiles x 20 ea  Therapeutic Activity 2: - blue web pulls  x 20 ea  Therapeutic Activity 3: - blue web weight bearng through extende hand/wrist x 20 ea  Therapeutic Activity 4: -dynamic, sustained gripping, moving wooden pegs, with calibrated gripper, silver springs,black spring in rung 2 x 5 min                   Modalities  Paraffin Bath: Patient received paraffin bath with ring and small fingers taped in flexion and wrist rested in extension, with moist hot pack,for to increase blood flow, circulation, pain management and for tissue elasticity prior to therex whie  providing LLPS..     Assessment & Plan   Assessment:    Evaluation/Treatment Tolerance: Patient tolerated treatment well    Patient will continue to benefit from skilled outpatient occupational therapy to address the deficits listed in the problem list box on initial evaluation, provide pt/family education and to maximize pt's level of independence in the home and community environment.     Patient's spiritual, cultural, and educational needs considered and patient agreeable to plan of care and goals.           Plan: Continue with POC.    Goals:   Active       Long Term Goals       Pt will report a pain level of 2 out of 10 with ADLs/IADLs        Start:  03/06/25    Expected End:  04/17/25            Pt will demo improved FOTO score by 40 points.        Start:  03/06/25    Expected End:  04/17/25            Pt will return to prior level of function for ADLs /IADLs        Start:  03/06/25    Expected End:  04/17/25            PT will show 20 degree increase in MP flex to improve grasp.during ADLs/IADLs.        Start:  03/06/25    Expected End:  04/17/25                Ifeoma Johnson OT

## 2025-03-14 ENCOUNTER — HOSPITAL ENCOUNTER (OUTPATIENT)
Facility: OTHER | Age: 40
Discharge: HOME | End: 2025-03-14
Attending: ANESTHESIOLOGY | Admitting: ANESTHESIOLOGY
Payer: COMMERCIAL

## 2025-03-14 VITALS
HEIGHT: 66 IN | DIASTOLIC BLOOD PRESSURE: 80 MMHG | TEMPERATURE: 98 F | OXYGEN SATURATION: 98 % | WEIGHT: 202 LBS | SYSTOLIC BLOOD PRESSURE: 119 MMHG | HEART RATE: 78 BPM | BODY MASS INDEX: 32.47 KG/M2 | RESPIRATION RATE: 18 BRPM

## 2025-03-14 DIAGNOSIS — G89.29 CHRONIC PAIN: ICD-10-CM

## 2025-03-14 DIAGNOSIS — M79.641 RIGHT HAND PAIN: Primary | ICD-10-CM

## 2025-03-14 DIAGNOSIS — G56.41 COMPLEX REGIONAL PAIN SYNDROME TYPE 2 OF RIGHT UPPER EXTREMITY: ICD-10-CM

## 2025-03-14 PROCEDURE — 64510 N BLOCK STELLATE GANGLION: CPT | Mod: RT,,, | Performed by: ANESTHESIOLOGY

## 2025-03-14 PROCEDURE — 63600175 PHARM REV CODE 636 W HCPCS: Performed by: ANESTHESIOLOGY

## 2025-03-14 PROCEDURE — 64510 N BLOCK STELLATE GANGLION: CPT | Mod: RT | Performed by: ANESTHESIOLOGY

## 2025-03-14 RX ORDER — SODIUM CHLORIDE 9 MG/ML
INJECTION, SOLUTION INTRAVENOUS CONTINUOUS
Status: DISCONTINUED | OUTPATIENT
Start: 2025-03-14 | End: 2025-03-14 | Stop reason: HOSPADM

## 2025-03-14 RX ORDER — BUPIVACAINE HYDROCHLORIDE 2.5 MG/ML
INJECTION, SOLUTION EPIDURAL; INFILTRATION; INTRACAUDAL; PERINEURAL
Status: DISCONTINUED | OUTPATIENT
Start: 2025-03-14 | End: 2025-03-14 | Stop reason: HOSPADM

## 2025-03-14 RX ORDER — LIDOCAINE HYDROCHLORIDE 20 MG/ML
INJECTION, SOLUTION INFILTRATION; PERINEURAL
Status: DISCONTINUED | OUTPATIENT
Start: 2025-03-14 | End: 2025-03-14 | Stop reason: HOSPADM

## 2025-03-14 RX ORDER — FENTANYL CITRATE 50 UG/ML
INJECTION, SOLUTION INTRAMUSCULAR; INTRAVENOUS
Status: DISCONTINUED | OUTPATIENT
Start: 2025-03-14 | End: 2025-03-14 | Stop reason: HOSPADM

## 2025-03-14 RX ORDER — MIDAZOLAM HYDROCHLORIDE 1 MG/ML
INJECTION INTRAMUSCULAR; INTRAVENOUS
Status: DISCONTINUED | OUTPATIENT
Start: 2025-03-14 | End: 2025-03-14 | Stop reason: HOSPADM

## 2025-03-14 RX ORDER — DEXAMETHASONE SODIUM PHOSPHATE 10 MG/ML
INJECTION, SOLUTION INTRA-ARTICULAR; INTRALESIONAL; INTRAMUSCULAR; INTRAVENOUS; SOFT TISSUE
Status: DISCONTINUED | OUTPATIENT
Start: 2025-03-14 | End: 2025-03-14 | Stop reason: HOSPADM

## 2025-03-14 NOTE — DISCHARGE SUMMARY
Discharge Note  Short Stay      SUMMARY     Admit Date: 3/14/2025    Attending Physician: Mao Guerrier MD    Discharge Physician: Mao Guerrier MD      Discharge Date: 3/14/2025 9:16 AM    Procedure(s) (LRB):  BLOCK, NERVE RIGHT STELLATE GANGLION 3 OF 3 (Right)    Final Diagnosis: Causalgia of upper limb, right [G56.41]    Disposition: Home or self care    Patient Instructions:   Current Discharge Medication List        CONTINUE these medications which have NOT CHANGED    Details   acyclovir 5% (ZOVIRAX) 5 % ointment Apply topically every 3 (three) hours.  Qty: 15 g, Refills: 1      bacitracin 500 unit/gram Oint Apply topically once daily. Apply with bandage changes.  Qty: 113 g, Refills: 0      clobetasoL (TEMOVATE) 0.05 % cream Apply topically 2 (two) times daily.  Qty: 60 g, Refills: 2      diazePAM (VALIUM) 10 MG Tab       EPINEPHrine (EPIPEN) 0.3 mg/0.3 mL AtIn       EScitalopram oxalate (LEXAPRO) 10 MG tablet Take 1 tablet (10 mg total) by mouth once daily.  Qty: 30 tablet, Refills: 0    Associated Diagnoses: Anxiety; Generalized anxiety disorder      famotidine (PEPCID) 20 MG tablet Take 1 tablet (20 mg total) by mouth 2 (two) times daily. for 5 days  Qty: 10 tablet, Refills: 0      fluticasone propionate (FLONASE) 50 mcg/actuation nasal spray       ketoconazole (NIZORAL) 2 % cream Apply topically daily as needed.  Qty: 15 g, Refills: 2      LIDOcaine (XYLOCAINE) 5 % Oint ointment Apply to affected area as directed      mupirocin (BACTROBAN) 2 % ointment every other day. Apply to affected area      valACYclovir (VALTREX) 500 MG tablet TAKE 1 TABLET (500 MG TOTAL) BY MOUTH 2 (TWO) TIMES DAILY AS NEEDED (OUTBREAK).  Qty: 30 tablet, Refills: 1                 Discharge Diagnosis: Causalgia of upper limb, right [G56.41]  Condition on Discharge: Stable with no complications to procedure   Diet on Discharge: Same as before.  Activity: as per instruction sheet.  Discharge to: Home with a responsible  adult.  Follow up: 2-4 weeks       Please call my office or pager at [phone] if experienced any weakness or loss of sensation, fever > 101.5, pain uncontrolled with oral medications, persistent nausea/vomiting/or diarrhea, redness or drainage from the incisions, or any other worrisome concerns. If physician on call was not reached or could not communicate with our office for any reason please go to the nearest emergency department     Franck Plummer MD

## 2025-03-14 NOTE — H&P
HPI  Patient presenting for Procedure(s) (LRB):  BLOCK, NERVE RIGHT STELLATE GANGLION 3 OF 3 (Right)     Patient on Anti-coagulation No    No health changes since previous encounter    Past Medical History:   Diagnosis Date    Abnormal Pap smear of cervix     cryo yrs. ago, then 3- hpv positive, pap normal, 2017 colpo ECC normal,     Endometriosis     per dx lap Bad endo per pt    HPV (human papilloma virus) infection     Infertility, female         Oral contraceptive use     PID (pelvic inflammatory disease)      Past Surgical History:   Procedure Laterality Date     SECTION      COLONOSCOPY N/A 2017    Procedure: COLONOSCOPY;  Surgeon: Jose Zaidi MD;  Location: [facility] ENDO (4TH FLR);  Service: Endoscopy;  Laterality: N/A;    fallopian tube removal Bilateral     GYNECOLOGIC CRYOSURGERY      HYSTEROSCOPY      2015    In Vitro      Dr Anuja Santana    INJECTION OF ANESTHETIC AGENT AROUND NERVE Right 2025    Procedure: BLOCK, NERVE RIGHT STELLATE GANGLION 1 OF 3;  Surgeon: Mao Guerrier MD;  Location: [facility], [place] PAIN MGT;  Service: Pain Management;  Laterality: Right;  *1 WK APART    INJECTION OF ANESTHETIC AGENT AROUND NERVE Right 2025    Procedure: BLOCK, NERVE RIGHT STELLATE GAMGLION 2 OF 3;  Surgeon: Mao Guerrier MD;  Location: [facility], [place] PAIN MGT;  Service: Pain Management;  Laterality: Right;    INJECTION OF BOTULINUM TOXIN TYPE A N/A 2022    Procedure: INJECTION, BOTULINUM TOXIN, TYPE A;  Surgeon: RADHA Bahena MD;  Location: [facility] OR 2ND FLR;  Service: Colon and Rectal;  Laterality: N/A;    PELVIC EXAMINATION UNDER ANESTHESIA N/A 2022    Procedure: EXAM UNDER ANESTHESIA, PELVIS;  Surgeon: RADHA Bahena MD;  Location: [facility] OR 2ND FLR;  Service: Colon and Rectal;  Laterality: N/A;     Review of patient's allergies indicates:   Allergen Reactions    Broccoli flower Hives, Itching and Swelling    Mold Hives, Itching and Swelling    Mustard  Hives, Itching and Swelling    Shellfish containing products Hives, Itching and Swelling    Soy Hives, Itching and Swelling    Strawberry Hives, Itching and Swelling    Keflex [cephalexin] Hives and Rash      No current facility-administered medications for this encounter.     Facility-Administered Medications Ordered in Other Encounters   Medication    0.9%  NaCl infusion    LIDOcaine (PF) 10 mg/ml (1%) injection 10 mg    mupirocin 2 % ointment       PMHx, PSHx, Allergies, Medications reviewed in epic    ROS negative except pain complaints in HPI    OBJECTIVE:    There were no vitals taken for this visit.    PHYSICAL EXAMINATION:    GENERAL: Well appearing, in no acute distress, alert and oriented x3.  PSYCH:  Mood and affect appropriate.  SKIN: Skin color, texture, turgor normal, no rashes or lesions which will impact the procedure.  CV: RRR with palpation of the radial artery.  PULM: No evidence of respiratory difficulty, symmetric chest rise. Clear to auscultation.  NEURO: Cranial nerves grossly intact.    Plan:    Proceed with procedure as planned Procedure(s) (LRB):  BLOCK, NERVE RIGHT STELLATE GANGLION 3 OF 3 (Right)    Dayo Chavez  03/14/2025

## 2025-03-14 NOTE — OP NOTE
Diagnostic/Therapeutic Right Stellate Ganglion Block under Fluoroscopic Guidance    The procedure, risks, benefits, and options were discussed with the patient. There are no contraindications to the procedure. The patent expressed understanding and agreed to the procedure. Informed written consent was obtained prior to the start of the procedure and can be found in the patient's chart.    PATIENT NAME: Syed Lyon   MRN: [id number]     DATE OF PROCEDURE: 03/14/2025    PROCEDURE: Diagnostic/Therapeutic Right Stellate Block under Fluoroscopic Guidance    PRE-OP DIAGNOSIS: Causalgia of upper limb, right [G56.41]    POST-OP DIAGNOSIS: Same    PHYSICIAN: Mao Guerrier MD    ASSISTANTS: Franck Plummer MD    MEDICATIONS INJECTED: Preservative-free Decadron 10mg with 9cc of Bupivacaine 0.25%     LOCAL ANESTHETIC INJECTED: Xylocaine 2%     SEDATION: Versed 2mg and Fentanyl 50mcg                                                                                                                                                                                     Conscious sedation ordered by M.D. Patient re-evaluation prior to administration of conscious sedation. No changes noted in patient's status from initial evaluation. The patient's vital signs were monitored by RN and patient remained hemodynamically stable throughout the procedure.    Event Time In   Sedation Start 0917   Sedation End 0926       ESTIMATED BLOOD LOSS: None    COMPLICATIONS: None    TECHNIQUE: Time-out was performed to identify the patient and procedure to be performed. With the patient laying in a supine position, the surgical area was prepped and draped in the usual sterile fashion using ChloraPrep and a fenestrated drape.The area was determined under fluoroscopy guidance. Skin anesthesia was achieved by injecting Lidocaine 2% over the injection site. Ultrasound guidance was used in order to avoid the neurovascular bundle.  This was followed by  advancement of a 21 gauge, 4 inch needle until contact with the transverse process at the level of C6 under US guidance to reach above the longus coli muscle. Once the final needle tip position was confirmed under ultrasound guidance, negative pressure was applied to confirm no intravascular placement.  10 mL of the medication mixture listed above was injected slowly. The needles were removed and bleeding was nil. A sterile dressing was applied. No specimens collected. The patient tolerated the procedure well.         The patient was monitored after the procedure in the recovery area. They were given post-procedure and discharge instructions to follow at home. The patient was discharged in a stable condition.    Franck Plummer MD     I reviewed and edited the fellow's note. I conducted my own interview and physical examination. I agree with the findings. I was present and supervising all critical portions of the procedure.     Mao Guerrier MD

## 2025-03-14 NOTE — DISCHARGE INSTRUCTIONS

## 2025-03-18 LAB — LPA SERPL-MCNC: 37 MG/DL (ref 0–30)

## 2025-03-19 ENCOUNTER — CLINICAL SUPPORT (OUTPATIENT)
Dept: REHABILITATION | Facility: HOSPITAL | Age: 40
End: 2025-03-19
Payer: COMMERCIAL

## 2025-03-19 DIAGNOSIS — M62.81 MUSCLE WEAKNESS: ICD-10-CM

## 2025-03-19 DIAGNOSIS — M25.641 STIFFNESS OF FINGER JOINT OF RIGHT HAND: ICD-10-CM

## 2025-03-19 DIAGNOSIS — M79.641 RIGHT HAND PAIN: Primary | ICD-10-CM

## 2025-03-19 PROCEDURE — 97110 THERAPEUTIC EXERCISES: CPT

## 2025-03-19 PROCEDURE — L3933 FO W/O JOINTS CF: HCPCS

## 2025-03-19 PROCEDURE — 97018 PARAFFIN BATH THERAPY: CPT

## 2025-03-19 NOTE — PROGRESS NOTES
Outpatient Rehab    Occupational Therapy Visit    Patient Name: Syed Lyon  MRN: [id number]  YOB: 1985  Encounter Date: 3/19/2025    Therapy Diagnosis:   Encounter Diagnoses   Name Primary?    Right hand pain Yes    Stiffness of finger joint of right hand     Muscle weakness      Physician: Gina Alcantar PA-C    Physician Orders: Eval and Treat  Medical Diagnosis: S62.356A (ICD-10-CM) - Closed nondisplaced fracture of shaft of fifth metacarpal bone of right hand, initial encounter   Surgical Procedure and Date: Not for this co     Visit # / Visits Authorized:  19 / 20   Date of Evaluation:  2/4/2025  Insurance Authorization Period: 1/1/2025 to 12/31/2025  Plan of Care Certification:  2/26/2025 to 4/14/2025                 Time In: 0800   Time Out: 0900  Total Time: 60   Total Billable Time: 30 min +     FOTO:  Intake Score: 38%  Survey Score 1: 38%  Survey Score 2: 71%         Subjective   Nothing new reported today..    Not rated today.    Objective   Orthosis Details  In this visit, actions taken with the first orthosis variety included Fabrication. Number issued of this variety was 1.      Fabrication Status: Custom  Orthosis Type: Static  Orthosis Design: Digit              Orthosis Position: Relative motion orthosis was fabricated to promote increase in MP flexion of the ring and small finger.   Orthosis Materials: Thermoplast    Orthosis Education  Orthosis education was provided to Patient. The education recipient's understanding level was Demonstrates understanding and Verbalizes understanding. Provided instruction to wear the orthosis During functional activity.   Also provided education regarding Precautions and Orthosis care.               Treatment:  Therapeutic Exercise  TE 1: -PROM: LLPS Ring and Small FIngers: MP flex x 20+ reps, Straight Fist,  Full Fist 15+ reps each with MP joint mobs and glides at times  TE 2: AAROM: LLPS Ring and Small FIngers: wave, Hook,  Hook to  Full Fist, Wave to Full Fist 10+ reps ea  Modalities  Paraffin Bath: Patient received paraffin bath with ring and small fingers taped in flexion and wrist rested in extension, with moist hot pack,for to increase blood flow, circulation, pain management and for tissue elasticity prior to therex whie providing LLPS..    Time Entry(in minutes):  Paraffin Bath Time Entry: 10  Therapeutic Exercise Time Entry: 20    Assessment & Plan   Assessment: Ganea continues to be limited in MP flexion.  Fabricated relative motion orthosis was fabricated to promote increase in MP flexion of the ring and small finger. She is to wear this during hand use.  Evaluation/Treatment Tolerance: Patient tolerated treatment well    Patient will continue to benefit from skilled outpatient occupational therapy to address the deficits listed in the problem list box on initial evaluation, provide pt/family education and to maximize pt's level of independence in the home and community environment.     Patient's spiritual, cultural, and educational needs considered and patient agreeable to plan of care and goals.           Plan: Continue with POC.    Goals:   Active       Long Term Goals       Pt will report a pain level of 2 out of 10 with ADLs/IADLs        Start:  03/06/25    Expected End:  04/17/25            Pt will demo improved FOTO score by 40 points.        Start:  03/06/25    Expected End:  04/17/25            Pt will return to prior level of function for ADLs /IADLs        Start:  03/06/25    Expected End:  04/17/25            PT will show 20 degree increase in MP flex to improve grasp.during ADLs/IADLs.        Start:  03/06/25    Expected End:  04/17/25                Ifeoma Johnson OT

## 2025-03-20 ENCOUNTER — CLINICAL SUPPORT (OUTPATIENT)
Dept: REHABILITATION | Facility: HOSPITAL | Age: 40
End: 2025-03-20
Payer: COMMERCIAL

## 2025-03-20 DIAGNOSIS — M25.641 STIFFNESS OF FINGER JOINT OF RIGHT HAND: ICD-10-CM

## 2025-03-20 DIAGNOSIS — M62.81 MUSCLE WEAKNESS: ICD-10-CM

## 2025-03-20 DIAGNOSIS — M79.641 RIGHT HAND PAIN: Primary | ICD-10-CM

## 2025-03-20 PROCEDURE — 97110 THERAPEUTIC EXERCISES: CPT | Mod: CO

## 2025-03-20 PROCEDURE — 97530 THERAPEUTIC ACTIVITIES: CPT | Mod: CO

## 2025-03-20 PROCEDURE — 97022 WHIRLPOOL THERAPY: CPT | Mod: CO

## 2025-03-20 NOTE — PROGRESS NOTES
Outpatient Rehab    Occupational Therapy Visit    Patient Name: Syed Lyon  MRN: [id number]  YOB: 1985  Encounter Date: 3/20/2025    Therapy Diagnosis:   Encounter Diagnoses   Name Primary?    Right hand pain Yes    Stiffness of finger joint of right hand     Muscle weakness      Physician: Gina Alcantar PA-C    Physician Orders: Eval and Treat  Medical Diagnosis: Displaced fracture of shaft of fifth metacarpal bone of right hand with delayed healing    Visit # / Visits Authorized: 24 / 40  Date of Evaluation:  2/4/2025  Insurance Authorization Period: 1/1/2025 to 12/31/2025  Plan of Care Certification:  2/26/2025 to 4/14/2025      Time In: 0746   Time Out: 0840  Total Time: 54   Total Billable Time: 54 min     FOTO:  Intake Score:  %  Survey Score 1:  %  Survey Score 2:  %         Subjective   having more pain in her hand, has been hurting a lot with the stretching.  Pain reported as 3/10. 3 or 4, however increases with stretching    Objective                   Treatment:  Therapeutic Exercise  TE 1: -PROM: LLPS Ring and Small FIngers: MP flex x 20+ reps, Straight Fist,  Full Fist 15+ reps each with MP joint mobs and glides at times  TE 5: green ring lumbrical squeeze x 30  TE 6: digi extender green x 30  Therapeutic Activity  TA 1: - green flexbar WF/WE, frowns/smiles x 20 ea  TA 2: - blue web pulls  x 20 ea  TA 5: blue web fisted pushes x 20  TA 6: in hand manip with small pegs, remove with red 1st rung PHG (42#) x 2 sets  TA 7: 9 hole peg placing and removing with SF x 3 sets  Manual Therapy  MT 1: vibration massage to SF hand and digit  Modalities  Fluidotherapy: Fluidotherapy: To involved hand for 10 min, continuous air, 115 deg, air speed 50 to decrease pain, edema & scar tissue, sensory re- education, and increased tissue extensibility prior to therex    Time Entry(in minutes):  Whirlpool Time Entry: 10  Manual Therapy Time Entry: 5  Therapeutic Activity Time Entry: 19  Therapeutic  Exercise Time Entry: 20    Assessment & Plan   Assessment:    Evaluation/Treatment Tolerance: Patient tolerated treatment well    Patient will continue to benefit from skilled outpatient occupational therapy to address the deficits listed in the problem list box on initial evaluation, provide pt/family education and to maximize pt's level of independence in the home and community environment.     Patient's spiritual, cultural, and educational needs considered and patient agreeable to plan of care and goals.           Plan: Continue with POC.    Goals:   Active       Long Term Goals       Pt will report a pain level of 2 out of 10 with ADLs/IADLs  (Progressing)       Start:  03/06/25    Expected End:  04/17/25            Pt will demo improved FOTO score by 40 points.  (Progressing)       Start:  03/06/25    Expected End:  04/17/25            Pt will return to prior level of function for ADLs /IADLs  (Progressing)       Start:  03/06/25    Expected End:  04/17/25            PT will show 20 degree increase in MP flex to improve grasp.during ADLs/IADLs.  (Progressing)       Start:  03/06/25    Expected End:  04/17/25                CUCA Oneal/GILL

## 2025-03-23 ENCOUNTER — PATIENT MESSAGE (OUTPATIENT)
Dept: REHABILITATION | Facility: HOSPITAL | Age: 40
End: 2025-03-23
Payer: COMMERCIAL

## 2025-03-26 ENCOUNTER — CLINICAL SUPPORT (OUTPATIENT)
Dept: REHABILITATION | Facility: HOSPITAL | Age: 40
End: 2025-03-26
Payer: COMMERCIAL

## 2025-03-26 DIAGNOSIS — M79.641 RIGHT HAND PAIN: Primary | ICD-10-CM

## 2025-03-26 DIAGNOSIS — M25.641 STIFFNESS OF FINGER JOINT OF RIGHT HAND: ICD-10-CM

## 2025-03-26 DIAGNOSIS — M62.81 MUSCLE WEAKNESS: ICD-10-CM

## 2025-03-26 PROCEDURE — 97530 THERAPEUTIC ACTIVITIES: CPT

## 2025-03-26 PROCEDURE — 97110 THERAPEUTIC EXERCISES: CPT

## 2025-03-26 PROCEDURE — 97022 WHIRLPOOL THERAPY: CPT

## 2025-03-26 NOTE — PROGRESS NOTES
"  Outpatient Rehab    Occupational Therapy Visit    Patient Name: Syed Lyon  MRN: [id number]  YOB: 1985  Encounter Date: 3/27/2025    Therapy Diagnosis:   Encounter Diagnoses   Name Primary?    Right hand pain Yes    Stiffness of finger joint of right hand     Muscle weakness      Physician: Gina Alcantar PA-C    Physician Orders: Eval and Treat  Medical Diagnosis: Displaced fracture of shaft of fifth metacarpal bone of right hand with delayed healing    Visit # / Visits Authorized: 26 / 40  Insurance Authorization Period: 1/1/2025 to 12/31/2025  Date of Evaluation:  2/4/2025    Plan of Care Certification:  2/26/2025 to 4/14/2025                    Time In: 0804   Time Out: 0858  Total Time: 54   Total Billable Time: 54    FOTO:  Intake Score:  %  Survey Score 1:  %  Survey Score 2:  %         Subjective   "I only have pain when I come to therapy.".         Objective           Treatment:  Therapeutic Exercise  TE 1: -PROM: LLPS Ring and Small FIngers: MP flex x 20+ reps, Straight Fist,  Full Fist 15+ reps each with MP joint mobs and glides at times  TE 5: green ring lumbrical squeeze x 30  TE 7: hooks to fists with water weight x 2 min  TE 8: upgraded to green putty: gripping and ulnar pulls x 2 min ea  Therapeutic Activity  TA 1: - green flexbar WF/WE, frowns/smiles x 20 ea  TA 3: - blue web weight bearng through extende hand/wrist x 20 ea  TA 5: blue web fisted pushes through fisted hand and pulls x 20 reps each  TA 6: in hand manip with small pegs, remove with red 1st rung PHG (42#) x 2 sets  Modalities  Paraffin Bath: Patient received paraffin bath with ring and small fingers taped in flexion and wrist rested in extension, with moist hot pack,for to increase blood flow, circulation, pain management and for tissue elasticity prior to therex whie providing LLPS..    Time Entry(in minutes):  Paraffin Bath Time Entry: 10  Therapeutic Activity Time Entry: 15  Therapeutic Exercise Time Entry: " 39    Assessment & Plan   Assessment:    Evaluation/Treatment Tolerance: Patient tolerated treatment well    Patient will continue to benefit from skilled outpatient occupational therapy to address the deficits listed in the problem list box on initial evaluation, provide pt/family education and to maximize pt's level of independence in the home and community environment.     Patient's spiritual, cultural, and educational needs considered and patient agreeable to plan of care and goals.           Plan: Continue with POC.    Goals:   Active       Long Term Goals       Pt will report a pain level of 2 out of 10 with ADLs/IADLs  (Progressing)       Start:  03/06/25    Expected End:  04/17/25            Pt will demo improved FOTO score by 40 points.  (Progressing)       Start:  03/06/25    Expected End:  04/17/25            Pt will return to prior level of function for ADLs /IADLs  (Progressing)       Start:  03/06/25    Expected End:  04/17/25            PT will show 20 degree increase in MP flex to improve grasp.during ADLs/IADLs.  (Progressing)       Start:  03/06/25    Expected End:  04/17/25                CUCA Oneal/GILL

## 2025-03-27 ENCOUNTER — CLINICAL SUPPORT (OUTPATIENT)
Dept: REHABILITATION | Facility: HOSPITAL | Age: 40
End: 2025-03-27
Payer: COMMERCIAL

## 2025-03-27 DIAGNOSIS — M25.641 STIFFNESS OF FINGER JOINT OF RIGHT HAND: ICD-10-CM

## 2025-03-27 DIAGNOSIS — M79.641 RIGHT HAND PAIN: Primary | ICD-10-CM

## 2025-03-27 DIAGNOSIS — M62.81 MUSCLE WEAKNESS: ICD-10-CM

## 2025-03-27 PROCEDURE — 97110 THERAPEUTIC EXERCISES: CPT | Mod: CO

## 2025-03-27 PROCEDURE — 97530 THERAPEUTIC ACTIVITIES: CPT | Mod: CO

## 2025-03-27 PROCEDURE — 97018 PARAFFIN BATH THERAPY: CPT | Mod: CO

## 2025-03-31 ENCOUNTER — CLINICAL SUPPORT (OUTPATIENT)
Dept: REHABILITATION | Facility: HOSPITAL | Age: 40
End: 2025-03-31
Payer: COMMERCIAL

## 2025-03-31 DIAGNOSIS — M25.641 STIFFNESS OF FINGER JOINT OF RIGHT HAND: ICD-10-CM

## 2025-03-31 DIAGNOSIS — M62.81 MUSCLE WEAKNESS: ICD-10-CM

## 2025-03-31 DIAGNOSIS — M79.641 RIGHT HAND PAIN: Primary | ICD-10-CM

## 2025-03-31 PROCEDURE — 97022 WHIRLPOOL THERAPY: CPT

## 2025-03-31 PROCEDURE — 97110 THERAPEUTIC EXERCISES: CPT

## 2025-03-31 PROCEDURE — 97530 THERAPEUTIC ACTIVITIES: CPT

## 2025-03-31 NOTE — PROGRESS NOTES
Outpatient Rehab    Occupational Therapy Visit    Patient Name: Syed Lyon  MRN: [id number]  YOB: 1985  Encounter Date: 3/31/2025    Therapy Diagnosis:   Encounter Diagnoses   Name Primary?    Right hand pain Yes    Stiffness of finger joint of right hand     Muscle weakness      Physician: Gina Alcantar PA-C    Physician Orders: Eval and Treat  Medical Diagnosis: Displaced fracture of shaft of fifth metacarpal bone of right hand with delayed healing    Visit # / Visits Authorized: 27 / 40  Insurance Authorization Period: 1/1/2025 to 12/31/2025  Date of Evaluation:  2/4/2025    Plan of Care Certification:  2/26/2025 to 4/14/2025     Time In: 0830   Time Out: 0930  Total Time: 60   Total Billable Time: 55    FOTO:  Intake Score: 38%  Survey Score 1: 38%  Survey Score 2: 71%         Subjective   Reports continued pain. Reports compliance with wear of flexion orthosis and HEP..         Objective           Treatment:  Therapeutic Exercise  TE 1: -PROM: LLPS Ring and Small FIngers: MP flex x 20+ reps, Straight Fist,  Full Fist 15+ reps each with MP joint mobs and glides at times  TE 2: AAROM: LLPS Ring and Small FIngers: wave, Hook,  Hook to Full Fist, Wave to Full Fist 10+ reps ea  Therapeutic Activity  TA 1: - green flexbar WF/WE, frowns/smiles x 20 ea  TA 2: - blue web pulls  x 20 ea  TA 3: - blue web weight bearng through extende hand/wrist x 20 ea  TA 4: -dynamic, sustained gripping, moving wooden pegs, with calibrated gripper, silver springs,black spring in rung 2 x 3 sets    Time Entry(in minutes):  Whirlpool Time Entry: 10  Therapeutic Activity Time Entry: 25  Therapeutic Exercise Time Entry: 20    Assessment & Plan   Assessment: Very good improvement in flexion noted upon clinical observation. C/o pain persists as expected at this time.  Evaluation/Treatment Tolerance: Patient tolerated treatment well    Patient will continue to benefit from skilled outpatient occupational therapy to  address the deficits listed in the problem list box on initial evaluation, provide pt/family education and to maximize pt's level of independence in the home and community environment.     Patient's spiritual, cultural, and educational needs considered and patient agreeable to plan of care and goals.           Plan: Continue with POC.    Goals:   Active       Long Term Goals       Pt will report a pain level of 2 out of 10 with ADLs/IADLs  (Progressing)       Start:  03/06/25    Expected End:  04/17/25            Pt will demo improved FOTO score by 40 points.  (Progressing)       Start:  03/06/25    Expected End:  04/17/25            Pt will return to prior level of function for ADLs /IADLs  (Progressing)       Start:  03/06/25    Expected End:  04/17/25            PT will show 20 degree increase in MP flex to improve grasp.during ADLs/IADLs.  (Progressing)       Start:  03/06/25    Expected End:  04/17/25                Ifeoma Johnson OT

## 2025-04-01 ENCOUNTER — OFFICE VISIT (OUTPATIENT)
Dept: PAIN MEDICINE | Facility: CLINIC | Age: 40
End: 2025-04-01
Payer: COMMERCIAL

## 2025-04-01 VITALS
SYSTOLIC BLOOD PRESSURE: 124 MMHG | BODY MASS INDEX: 33.2 KG/M2 | HEART RATE: 71 BPM | WEIGHT: 205.69 LBS | DIASTOLIC BLOOD PRESSURE: 80 MMHG

## 2025-04-01 DIAGNOSIS — M79.18 MYOFASCIAL PAIN: ICD-10-CM

## 2025-04-01 DIAGNOSIS — G56.41 COMPLEX REGIONAL PAIN SYNDROME TYPE 2 OF RIGHT UPPER EXTREMITY: Primary | ICD-10-CM

## 2025-04-01 PROCEDURE — 3074F SYST BP LT 130 MM HG: CPT | Mod: CPTII,S$GLB,, | Performed by: NURSE PRACTITIONER

## 2025-04-01 PROCEDURE — 99214 OFFICE O/P EST MOD 30 MIN: CPT | Mod: S$GLB,,, | Performed by: NURSE PRACTITIONER

## 2025-04-01 PROCEDURE — 1160F RVW MEDS BY RX/DR IN RCRD: CPT | Mod: CPTII,S$GLB,, | Performed by: NURSE PRACTITIONER

## 2025-04-01 PROCEDURE — 3079F DIAST BP 80-89 MM HG: CPT | Mod: CPTII,S$GLB,, | Performed by: NURSE PRACTITIONER

## 2025-04-01 PROCEDURE — 3008F BODY MASS INDEX DOCD: CPT | Mod: CPTII,S$GLB,, | Performed by: NURSE PRACTITIONER

## 2025-04-01 PROCEDURE — 99999 PR PBB SHADOW E&M-EST. PATIENT-LVL III: CPT | Mod: PBBFAC,,, | Performed by: NURSE PRACTITIONER

## 2025-04-01 PROCEDURE — 1159F MED LIST DOCD IN RCRD: CPT | Mod: CPTII,S$GLB,, | Performed by: NURSE PRACTITIONER

## 2025-04-01 RX ORDER — TIZANIDINE 4 MG/1
4 TABLET ORAL NIGHTLY PRN
Qty: 30 TABLET | Refills: 1 | Status: SHIPPED | OUTPATIENT
Start: 2025-04-01 | End: 2025-05-31
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
## 2025-04-02 ENCOUNTER — CLINICAL SUPPORT (OUTPATIENT)
Dept: REHABILITATION | Facility: HOSPITAL | Age: 40
End: 2025-04-02
Payer: COMMERCIAL

## 2025-04-02 DIAGNOSIS — M79.641 RIGHT HAND PAIN: Primary | ICD-10-CM

## 2025-04-02 DIAGNOSIS — M25.641 STIFFNESS OF FINGER JOINT OF RIGHT HAND: ICD-10-CM

## 2025-04-02 DIAGNOSIS — M62.81 MUSCLE WEAKNESS: ICD-10-CM

## 2025-04-02 PROCEDURE — 97763 ORTHC/PROSTC MGMT SBSQ ENC: CPT

## 2025-04-02 PROCEDURE — 97530 THERAPEUTIC ACTIVITIES: CPT

## 2025-04-02 PROCEDURE — 97018 PARAFFIN BATH THERAPY: CPT

## 2025-04-02 PROCEDURE — 97110 THERAPEUTIC EXERCISES: CPT

## 2025-04-02 NOTE — PROGRESS NOTES
Outpatient Rehab    Occupational Therapy Visit    Patient Name: Syed Lyon  MRN: [id number]  YOB: 1985  Encounter Date: 4/2/2025    Therapy Diagnosis:   Encounter Diagnoses   Name Primary?    Right hand pain Yes    Stiffness of finger joint of right hand     Muscle weakness      Physician: Gina Alcantar PA-C    Physician Orders: Eval and Treat  Medical Diagnosis: Displaced fracture of shaft of fifth metacarpal bone of right hand with delayed healing    Visit # / Visits Authorized: 28 / 40  Insurance Authorization Period: 1/1/2025 to 12/31/2025  Date of Evaluation:  2/4/2025    Plan of Care Certification:  2/26/2025 to 4/14/2025                 Time In:     Time Out: 0858  Total Time:     Total Billable Time:      FOTO:  Intake Score: 38%  Survey Score 1: 38%  Survey Score 2: 71%         Subjective   Reports continued soreness..    Not rated today.    Objective   Orthosis Details  In this visit, actions taken with the first orthosis variety included Modification/adjustment.   Number modified of this variety was 1.                               Orthosis Position: Traction of custom, static-progression flexion orthosis required replacement because it broke. Fabricated the replacement this session.              Treatment:  Therapeutic Exercise  TE 1: -PROM: LLPS Ring and Small FIngers: MP flex x 20+ reps, Straight Fist,  Full Fist 15+ reps each with MP joint mobs and glides at times  TE 2: AAROM: LLPS Ring and Small FIngers: wave, Hook,  Hook to Full Fist, Wave to Full Fist 10+ reps ea  TE 8: wrist curls, wrist over wedge, holding 2.2 lbs ball, wrist ext x 20 reps  Therapeutic Activity  TA 1: - green flexbar WF/WE, frowns/smiles x 20 ea  TA 2: - blue web pulls  x 20 ea  TA 3: - blue web weight bearng through extende hand/wrist x 20 ea  TA 5: blue web fisted pushes through fisted hand and pulls x 20 reps each  Modalities  Paraffin Bath: Patient received paraffin bath with ring and small fingers  taped in flexion  with moist hot pack,for to increase blood flow, circulation, pain management and for tissue elasticity prior to therex whie providing LLPS..    Time Entry(in minutes):  Paraffin Bath Time Entry: 10  Orthotic/Prosthetic Mgmt and/or Training (Subs Encounter) Time Entry: 10  Therapeutic Activity Time Entry: 20  Therapeutic Exercise Time Entry: 15    Assessment & Plan   Assessment: Very good improvement in flexion noted upon clinical observation. C/o pain persists as expected at this time.       Patient will continue to benefit from skilled outpatient occupational therapy to address the deficits listed in the problem list box on initial evaluation, provide pt/family education and to maximize pt's level of independence in the home and community environment.     Patient's spiritual, cultural, and educational needs considered and patient agreeable to plan of care and goals.           Plan: Continue with POC.    Goals:   Active       Long Term Goals       Pt will report a pain level of 2 out of 10 with ADLs/IADLs  (Progressing)       Start:  03/06/25    Expected End:  04/17/25            Pt will demo improved FOTO score by 40 points.  (Progressing)       Start:  03/06/25    Expected End:  04/17/25            Pt will return to prior level of function for ADLs /IADLs  (Progressing)       Start:  03/06/25    Expected End:  04/17/25            PT will show 20 degree increase in MP flex to improve grasp.during ADLs/IADLs.  (Progressing)       Start:  03/06/25    Expected End:  04/17/25                Ifeoma Johnson OT

## 2025-04-03 ENCOUNTER — CLINICAL SUPPORT (OUTPATIENT)
Dept: REHABILITATION | Facility: HOSPITAL | Age: 40
End: 2025-04-03
Payer: COMMERCIAL

## 2025-04-03 DIAGNOSIS — M62.81 MUSCLE WEAKNESS: ICD-10-CM

## 2025-04-03 DIAGNOSIS — M25.641 STIFFNESS OF FINGER JOINT OF RIGHT HAND: ICD-10-CM

## 2025-04-03 DIAGNOSIS — M79.641 RIGHT HAND PAIN: Primary | ICD-10-CM

## 2025-04-03 PROCEDURE — 97530 THERAPEUTIC ACTIVITIES: CPT

## 2025-04-03 PROCEDURE — 97018 PARAFFIN BATH THERAPY: CPT

## 2025-04-03 PROCEDURE — 97110 THERAPEUTIC EXERCISES: CPT

## 2025-04-03 NOTE — PROGRESS NOTES
Outpatient Rehab    Occupational Therapy Visit    Patient Name: Syed Lyon  MRN: [id number]  YOB: 1985  Encounter Date: 4/3/2025    Therapy Diagnosis:   Encounter Diagnoses   Name Primary?    Right hand pain Yes    Stiffness of finger joint of right hand     Muscle weakness      Physician: Gina Alcantar PA-C    Physician Orders: Eval and Treat  Medical Diagnosis: Displaced fracture of shaft of fifth metacarpal bone of right hand with delayed healing    Visit # / Visits Authorized: 29 / 40  Insurance Authorization Period: 1/1/2025 to 12/31/2025  Date of Evaluation:  2/4/2025  Plan of Care Certification:  2/26/2025 to 4/14/2025   Time In: 0800   Time Out: 0830  Total Time: 30   Total Billable Time: 55    FOTO:  Intake Score: 38%  Survey Score 1: 38%  Survey Score 2: 71%         Subjective   [place]ea was without new report..    Not rated today.    Objective   Orthosis Details  In this visit, actions taken with the first orthosis variety included Modification/adjustment.                                  Orthosis Position: Traction of custom, static-progression flexion orthosis required  repair because it broke.              Treatment:  Therapeutic Exercise  TE 1: -PROM: LLPS Ring and Small FIngers: MP flex x 20+ reps, Straight Fist,  Full Fist 15+ reps each with MP joint mobs and glides at times  TE 2: AAROM: LLPS Ring and Small FIngers: wave, Hook,  Hook to Full Fist, Wave to Full Fist 10+ reps ea  Therapeutic Activity  TA 1: - green flexbar WF/WE, frowns/smiles x 20 ea  TA 2: - blue web pulls  x 20 ea  TA 3: - blue web weight bearng through extende hand/wrist x 20 ea  TA 5: blue web fisted pushes through fisted hand and pulls x 20 reps each    Time Entry(in minutes):  Paraffin Bath Time Entry: 10  Orthotic/Prosthetic Mgmt and/or Training (Subs Encounter) Time Entry: 5  Therapeutic Activity Time Entry: 20  Therapeutic Exercise Time Entry: 15    Assessment & Plan   Assessment: Very good improvement  in flexion conintues as noted upon clinical observation.       Patient will continue to benefit from skilled outpatient occupational therapy to address the deficits listed in the problem list box on initial evaluation, provide pt/family education and to maximize pt's level of independence in the home and community environment.     Patient's spiritual, cultural, and educational needs considered and patient agreeable to plan of care and goals.           Plan: Continue with POC.    Goals:   Active       Long Term Goals       Pt will report a pain level of 2 out of 10 with ADLs/IADLs  (Progressing)       Start:  03/06/25    Expected End:  04/17/25            Pt will demo improved FOTO score by 40 points.  (Progressing)       Start:  03/06/25    Expected End:  04/17/25            Pt will return to prior level of function for ADLs /IADLs  (Progressing)       Start:  03/06/25    Expected End:  04/17/25            PT will show 20 degree increase in MP flex to improve grasp.during ADLs/IADLs.  (Progressing)       Start:  03/06/25    Expected End:  04/17/25                Ifeoma Johnson OT

## 2025-04-07 ENCOUNTER — CLINICAL SUPPORT (OUTPATIENT)
Dept: REHABILITATION | Facility: HOSPITAL | Age: 40
End: 2025-04-07
Payer: COMMERCIAL

## 2025-04-07 DIAGNOSIS — M25.641 STIFFNESS OF FINGER JOINT OF RIGHT HAND: ICD-10-CM

## 2025-04-07 DIAGNOSIS — M79.641 RIGHT HAND PAIN: Primary | ICD-10-CM

## 2025-04-07 DIAGNOSIS — M62.81 MUSCLE WEAKNESS: ICD-10-CM

## 2025-04-07 PROCEDURE — 97530 THERAPEUTIC ACTIVITIES: CPT

## 2025-04-07 PROCEDURE — 97110 THERAPEUTIC EXERCISES: CPT

## 2025-04-07 PROCEDURE — 97022 WHIRLPOOL THERAPY: CPT

## 2025-04-07 NOTE — PROGRESS NOTES
Outpatient Rehab    Occupational Therapy Visit    Patient Name: Syed Lyon  MRN: [id number]  YOB: 1985  Encounter Date: 4/7/2025    Therapy Diagnosis:   Encounter Diagnoses   Name Primary?    Right hand pain Yes    Stiffness of finger joint of right hand     Muscle weakness      Physician: Gina Alcantar PA-C    Physician Orders: Eval and Treat  Medical Diagnosis: Displaced fracture of shaft of fifth metacarpal bone of right hand with delayed healing    Visit # / Visits Authorized: 30 / 40  Insurance Authorization Period: 1/1/2025 to 12/31/2025  Date of Evaluation:  2/4/2025  Plan of Care Certification: 2/26/2025 to 4/14/2025      Time In: 0800   Time Out: 0845  Total Time: 45   Total Billable Time:      FOTO:  Intake Score: 38%  Survey Score 1: 71%  Survey Score 2: 60%         Subjective   Reports improvement in flexion..         Objective           Treatment:  Therapeutic Exercise  TE 1: -PROM: LLPS Ring and Small FIngers: MP flex x 20+ reps, Straight Fist,  Full Fist 15+ reps each with MP joint mobs and glides at times  TE 2: AAROM: LLPS Ring and Small FIngers: wave, Hook,  Hook to Full Fist, Wave to Full Fist 10+ reps ea  Therapeutic Activity  TA 1: - green flexbar WF/WE, frowns/smiles x 20 ea  TA 2: - blue web pulls  x 20 ea  TA 3: - blue web weight bearng through extende hand/wrist x 20 ea  TA 4: -dynamic, sustained gripping, moving wooden pegs, with calibrated gripper, silver springs,black spring in rung x 3 sets  TA 5: blue web fisted pushes through fisted hand and pulls x 20 reps each  Modalities  Fluidotherapy: Fluidotherapy: To involved hand for 10 min, continuous air, 115 deg, air speed 50 to decrease pain, edema & scar tissue, sensory re- education, and increased tissue extensibility prior to therex    Time Entry(in minutes):  [place] Time Entry: 10  Therapeutic Activity Time Entry: 20  Therapeutic Exercise Time Entry: 15    Assessment & Plan   Assessment:     Evaluation/Treatment Tolerance: Patient tolerated treatment well    Patient will continue to benefit from skilled outpatient occupational therapy to address the deficits listed in the problem list box on initial evaluation, provide pt/family education and to maximize pt's level of independence in the home and community environment.     Patient's spiritual, cultural, and educational needs considered and patient agreeable to plan of care and goals.           Plan: Continue with POC.    Goals:   Active       Long Term Goals       Pt will report a pain level of 2 out of 10 with ADLs/IADLs  (Progressing)       Start:  03/06/25    Expected End:  04/17/25            Pt will demo improved FOTO score by 40 points.  (Progressing)       Start:  03/06/25    Expected End:  04/17/25            Pt will return to prior level of function for ADLs /IADLs  (Progressing)       Start:  03/06/25    Expected End:  04/17/25            PT will show 20 degree increase in MP flex to improve grasp.during ADLs/IADLs.  (Progressing)       Start:  03/06/25    Expected End:  04/17/25                Ifeoma Johnson OT

## 2025-04-09 ENCOUNTER — CLINICAL SUPPORT (OUTPATIENT)
Dept: REHABILITATION | Facility: HOSPITAL | Age: 40
End: 2025-04-09
Payer: COMMERCIAL

## 2025-04-09 DIAGNOSIS — M62.81 MUSCLE WEAKNESS: ICD-10-CM

## 2025-04-09 DIAGNOSIS — M25.641 STIFFNESS OF FINGER JOINT OF RIGHT HAND: ICD-10-CM

## 2025-04-09 DIAGNOSIS — M79.641 RIGHT HAND PAIN: Primary | ICD-10-CM

## 2025-04-09 PROCEDURE — 97022 WHIRLPOOL THERAPY: CPT

## 2025-04-09 PROCEDURE — 97110 THERAPEUTIC EXERCISES: CPT

## 2025-04-09 PROCEDURE — 97530 THERAPEUTIC ACTIVITIES: CPT

## 2025-04-09 NOTE — PROGRESS NOTES
Outpatient Rehab    Occupational Therapy Visit    Patient Name: Syed Lyon  MRN: [id number]  YOB: 1985  Encounter Date: 4/10/2025    Therapy Diagnosis:   Encounter Diagnoses   Name Primary?    Right hand pain Yes    Stiffness of finger joint of right hand     Muscle weakness      Physician: Gina Alcantar PA-C    Physician Orders: Eval and Treat  Medical Diagnosis: Displaced fracture of shaft of fifth metacarpal bone of right hand with delayed healing    Visit # / Visits Authorized: 32 / 40  Insurance Authorization Period: 1/1/2025 to 12/31/2025    Date of Evaluation:  2/4/2025    Plan of Care Certification:  2/26/2025 to 4/14/2025                    Time In: 0805   Time Out: 0855  Total Time: 50   Total Billable Time:      FOTO:  Intake Score:  %  Survey Score 1:  %  Survey Score 2:  %         Subjective   still stiff and hurts.  Pain reported as 3/10.      Objective           Treatment:  Therapeutic Exercise  TE 1: -PROM: LLPS Ring and Small FIngers: MP flex x 20+ reps, Straight Fist,  Full Fist 15+ reps each with MP joint mobs and glides at times  TE 3: Wrist Pre-s with 3 lbs, over wedge, fa in 3 planes, 3/10 reps ea  TE 5: green ring lumbrical squeeze x 30  TE 8: wrist curls, wrist over wedge, holding 2.2 lbs ball, wrist ext x 20 reps  TE 9: water weight pink putty presses x 3 min  Therapeutic Activity  TA 1: - green flexbar WF/WE, frowns/smiles x 30 ea  TA 5: blue web fisted pushes through fisted and extended hand and pulls x 20 reps each  TA 6: in hand manip with small pegs, remove with silver 2nd rung PHG (45#) x 2 sets  Manual Therapy  MT 1: MP joint mobilization for flexion grades 2-3  Modalities  Paraffin Bath: Patient received paraffin bath with ring and small fingers taped in flexion  with moist hot pack,for to increase blood flow, circulation, pain management and for tissue elasticity prior to therex    Time Entry(in minutes):  Paraffin Bath Time Entry: 10  Manual Therapy Time  Entry: 8  Therapeutic Activity Time Entry: 15  Therapeutic Exercise Time Entry: 17    Assessment & Plan   Assessment:    Evaluation/Treatment Tolerance: Patient tolerated treatment well    Patient will continue to benefit from skilled outpatient occupational therapy to address the deficits listed in the problem list box on initial evaluation, provide pt/family education and to maximize pt's level of independence in the home and community environment.     Patient's spiritual, cultural, and educational needs considered and patient agreeable to plan of care and goals.           Plan: Continue with POC.    Goals:   Active       Long Term Goals       Pt will report a pain level of 2 out of 10 with ADLs/IADLs  (Progressing)       Start:  03/06/25    Expected End:  04/17/25            Pt will demo improved FOTO score by 40 points.  (Progressing)       Start:  03/06/25    Expected End:  04/17/25            Pt will return to prior level of function for ADLs /IADLs  (Progressing)       Start:  03/06/25    Expected End:  04/17/25            PT will show 20 degree increase in MP flex to improve grasp.during ADLs/IADLs.  (Progressing)       Start:  03/06/25    Expected End:  04/17/25                CUCA Oneal/GILL

## 2025-04-09 NOTE — PROGRESS NOTES
Outpatient Rehab    Occupational Therapy Visit    Patient Name: Syed Lyon  MRN: [id number]  YOB: 1985  Encounter Date: 4/9/2025    Therapy Diagnosis:   Encounter Diagnoses   Name Primary?    Right hand pain Yes    Stiffness of finger joint of right hand     Muscle weakness      Physician: Gina Alcantar PA-C    Physician Orders: Eval and Treat  Medical Diagnosis: Displaced fracture of shaft of fifth metacarpal bone of right hand with delayed healing    Visit # / Visits Authorized: 31 / 40  Insurance Authorization Period: 1/1/2025 to 12/31/2025  Date of Evaluation: 2/4/2025  Plan of Care Certification:  2/26/2025 to 4/14/2025   Time In: 0800   Time Out: 0900  Total Time: 60   Total Billable Time: 55    FOTO:  Intake Score: 38%  Survey Score 1: 71%  Survey Score 2: 60%         Subjective   Nothing new to report today..    Not rated today.    Objective           Treatment:  Therapeutic Exercise  TE 1: -PROM: LLPS Ring and Small FIngers: MP flex x 20+ reps, Straight Fist,  Full Fist 15+ reps each with MP joint mobs and glides at times  TE 2: AAROM: LLPS Ring and Small FIngers: wave, Hook,  Hook to Full Fist, Wave to Full Fist 10+ reps ea  TE 3: Wrist Pre-s with 3 lbs, over wedge, fa in 3 planes, 3/10 reps ea  TE 8: wrist curls, wrist over wedge, holding 2.2 lbs ball, wrist ext x 20 reps  Therapeutic Activity  TA 1: - green flexbar WF/WE, frowns/smiles x 20 ea  TA 2: - blue web pulls  x 20 ea  TA 3: - blue web weight bearng through extende hand/wrist x 20 ea  TA 4: -dynamic, sustained gripping, moving wooden pegs, with calibrated gripper, silver springs,black spring in rung x 6 sets  TA 5: blue web fisted pushes through fisted hand and pulls x 20 reps each  Modalities  Fluidotherapy: Fluidotherapy: To involved hand with ring and small fingers taped in flexionfor 10 min, continuous air, 115 deg, air speed 50 to decrease pain, edema & scar tissue, sensory re- education, and increased tissue  extensibility prior to therex    Time Entry(in minutes):  [place]irlpool Time Entry: 10  Therapeutic Activity Time Entry: 25  Therapeutic Exercise Time Entry: 20    Assessment & Plan   Assessment:    Evaluation/Treatment Tolerance: Patient tolerated treatment well    Patient will continue to benefit from skilled outpatient occupational therapy to address the deficits listed in the problem list box on initial evaluation, provide pt/family education and to maximize pt's level of independence in the home and community environment.     Patient's spiritual, cultural, and educational needs considered and patient agreeable to plan of care and goals.           Plan: Continue with POC.    Goals:   Active       Long Term Goals       Pt will report a pain level of 2 out of 10 with ADLs/IADLs  (Progressing)       Start:  03/06/25    Expected End:  04/17/25            Pt will demo improved FOTO score by 40 points.  (Progressing)       Start:  03/06/25    Expected End:  04/17/25            Pt will return to prior level of function for ADLs /IADLs  (Progressing)       Start:  03/06/25    Expected End:  04/17/25            PT will show 20 degree increase in MP flex to improve grasp.during ADLs/IADLs.  (Progressing)       Start:  03/06/25    Expected End:  04/17/25                Ifeoma Johnson OT

## 2025-04-10 ENCOUNTER — CLINICAL SUPPORT (OUTPATIENT)
Dept: REHABILITATION | Facility: HOSPITAL | Age: 40
End: 2025-04-10
Payer: COMMERCIAL

## 2025-04-10 DIAGNOSIS — M25.641 STIFFNESS OF FINGER JOINT OF RIGHT HAND: ICD-10-CM

## 2025-04-10 DIAGNOSIS — M62.81 MUSCLE WEAKNESS: ICD-10-CM

## 2025-04-10 DIAGNOSIS — M79.641 RIGHT HAND PAIN: Primary | ICD-10-CM

## 2025-04-10 PROCEDURE — 97140 MANUAL THERAPY 1/> REGIONS: CPT | Mod: CO

## 2025-04-10 PROCEDURE — 97530 THERAPEUTIC ACTIVITIES: CPT | Mod: CO

## 2025-04-10 PROCEDURE — 97018 PARAFFIN BATH THERAPY: CPT | Mod: CO

## 2025-04-10 PROCEDURE — 97110 THERAPEUTIC EXERCISES: CPT | Mod: CO
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
## 2025-04-14 ENCOUNTER — CLINICAL SUPPORT (OUTPATIENT)
Dept: REHABILITATION | Facility: HOSPITAL | Age: 40
End: 2025-04-14
Payer: COMMERCIAL

## 2025-04-14 ENCOUNTER — TELEPHONE (OUTPATIENT)
Dept: ORTHOPEDICS | Facility: CLINIC | Age: 40
End: 2025-04-14
Payer: COMMERCIAL

## 2025-04-14 DIAGNOSIS — M62.81 MUSCLE WEAKNESS: ICD-10-CM

## 2025-04-14 DIAGNOSIS — M79.641 RIGHT HAND PAIN: Primary | ICD-10-CM

## 2025-04-14 DIAGNOSIS — M25.641 STIFFNESS OF FINGER JOINT OF RIGHT HAND: ICD-10-CM

## 2025-04-14 PROCEDURE — 97530 THERAPEUTIC ACTIVITIES: CPT

## 2025-04-14 PROCEDURE — 97018 PARAFFIN BATH THERAPY: CPT

## 2025-04-14 PROCEDURE — 97110 THERAPEUTIC EXERCISES: CPT

## 2025-04-14 NOTE — PROGRESS NOTES
"  Outpatient Rehab    Occupational Therapy Visit    Patient Name: Syed Lyon  MRN: [id number]  YOB: 1985  Encounter Date: 4/14/2025    Therapy Diagnosis:   Encounter Diagnoses   Name Primary?    Right hand pain Yes    Stiffness of finger joint of right hand     Muscle weakness      Physician: Gina Alcantar PA-C    Physician Orders: Eval and Treat  Medical Diagnosis: Displaced fracture of shaft of fifth metacarpal bone of right hand with delayed healing    Visit # / Visits Authorized: 33 / 40  Insurance Authorization Period: 1/1/2025 to 12/31/2025  Date of Evaluation:  2/4/2025  Plan of Care Certification: 2/26/2025 to 4/14/2025   Time In: 0800   Time Out:    Total Time:     Total Billable Time:      FOTO:  Intake Score: 38%  Survey Score 1: 71%  Survey Score 2: 60%         Subjective   "It  just gets real stiff." "It's so sore when I'm bending in here" (indicating the IPs) Reports pain in the MP is "only when I use it..cooking and cleaning."  Described painat MP as aching. Describes IP pain as tightness and "it hurts"..         Objective      Digit 4 - Ring Finger Active Range of Motion  Right Ring Finger   Extension (deg) Flexion (deg) Pain   MCP   79     PIP   104     DIP   65     HOWELL:        Digit 5 - Little Finger Active Range of Motion  Right Little Finger   Extension (deg) Flexion (deg) Pain   MCP   67     PIP   110     DIP   60     HOWELL:                          Right  Strength  Right Hand Dynamometer Position: 2  Elbow Position Forearm Position Trial 1 (lbs) Trial 2  (lbs) Trial 3  (lbs) Average  (lbs) Pain   Flexed Neutral 45                         Treatment:  Therapeutic Exercise  TE 1: PROM: LLPS Ring and Small FIngers: MP flex x 20+ reps, Straight Fist,  Full Fist 15+ reps each with MP joint mobs and glides at times  TE 2: AAROM -Straight Fist x 20+ reps  TE 4: Reassessment of AROM and  strength  Therapeutic Activity  TA 1: green flexbar WF/WE, frowns/smiles x 30 ea  TA 2: " blue web fisted pushes through fisted and extended hand and pulls x 20 reps each  TA 3: dynamic sustained :  moving small pegs with silver 2nd rung PHG (45#) x 2 sets  Modalities  Paraffin Bath: Patient received paraffin bath with moist hot pack,for to increase blood flow, circulation, pain management and for tissue elasticity prior to therex    Time Entry(in minutes):       Assessment & Plan   Assessment: Reassessment reveals  increases in flexion of the ring finger and DIP of the small finger. Also good increase noted in right right  strength. C/o pain in IP's greater than in the MP of the SF.       Patient will continue to benefit from skilled outpatient occupational therapy to address the deficits listed in the problem list box on initial evaluation, provide pt/family education and to maximize pt's level of independence in the home and community environment.     Patient's spiritual, cultural, and educational needs considered and patient agreeable to plan of care and goals.           Plan:      Goals:   Active       Long Term Goals       Pt will report a pain level of 2 out of 10 with ADLs/IADLs  (Progressing)       Start:  03/06/25    Expected End:  04/17/25            Pt will demo improved FOTO score by 40 points.  (Progressing)       Start:  03/06/25    Expected End:  04/17/25            Pt will return to prior level of function for ADLs /IADLs  (Progressing)       Start:  03/06/25    Expected End:  04/17/25            PT will show 20 degree increase in MP flex to improve grasp.during ADLs/IADLs.  (Progressing)       Start:  03/06/25    Expected End:  04/17/25                Ifeoma Johnson, OT

## 2025-04-15 ENCOUNTER — OFFICE VISIT (OUTPATIENT)
Dept: ORTHOPEDICS | Facility: CLINIC | Age: 40
End: 2025-04-15
Payer: COMMERCIAL

## 2025-04-15 DIAGNOSIS — M79.641 RIGHT HAND PAIN: ICD-10-CM

## 2025-04-15 DIAGNOSIS — S62.326G CLOSED DISPLACED FRACTURE OF SHAFT OF FIFTH METACARPAL BONE OF RIGHT HAND WITH DELAYED HEALING, SUBSEQUENT ENCOUNTER: Primary | ICD-10-CM

## 2025-04-15 PROCEDURE — 99999 PR PBB SHADOW E&M-EST. PATIENT-LVL III: CPT | Mod: PBBFAC,,,

## 2025-04-15 PROCEDURE — 1160F RVW MEDS BY RX/DR IN RCRD: CPT | Mod: CPTII,S$GLB,,

## 2025-04-15 PROCEDURE — 1159F MED LIST DOCD IN RCRD: CPT | Mod: CPTII,S$GLB,,

## 2025-04-15 PROCEDURE — 99213 OFFICE O/P EST LOW 20 MIN: CPT | Mod: S$GLB,,,

## 2025-04-16 ENCOUNTER — CLINICAL SUPPORT (OUTPATIENT)
Dept: REHABILITATION | Facility: HOSPITAL | Age: 40
End: 2025-04-16
Payer: COMMERCIAL

## 2025-04-16 DIAGNOSIS — M25.641 STIFFNESS OF FINGER JOINT OF RIGHT HAND: ICD-10-CM

## 2025-04-16 DIAGNOSIS — M79.641 RIGHT HAND PAIN: Primary | ICD-10-CM

## 2025-04-16 DIAGNOSIS — M62.81 MUSCLE WEAKNESS: ICD-10-CM

## 2025-04-16 PROCEDURE — 97018 PARAFFIN BATH THERAPY: CPT

## 2025-04-16 PROCEDURE — 97530 THERAPEUTIC ACTIVITIES: CPT

## 2025-04-16 PROCEDURE — 97110 THERAPEUTIC EXERCISES: CPT

## 2025-04-16 NOTE — PROGRESS NOTES
Outpatient Rehab    Occupational Therapy Progress Note : Updated Plan of Care    Patient Name: Syed Lyon  MRN: [id number]  YOB: 1985  Encounter Date: 2025    Therapy Diagnosis: No diagnosis found.  Physician: Gina Alcantar PA-C    Physician Orders: Eval and Treat  Medical Diagnosis: Displaced fracture of shaft of fifth metacarpal bone of right hand with delayed healing    Visit # / Visits Authorized:   Insurance Authorization Period: 2025 to 2025  Date of Evaluation:  2025    Plan of Care Certification:  2025 to 2025      Time In: 0800   Time Out: 0853  Total Time: 53   Total Billable Time: 53    FOTO:  Intake Score: 38%  Survey Score 1: 71%  Survey Score 2: 60%         Subjective   Ricardo reports  less soreness in the IP's today..  Pain reported as 3/10.      Objective    Digit 4 - Ring Finger Active Range of Motion  Right Ring Finger    Extension (deg) Flexion (deg) Pain   MCP   79     PIP   104     DIP   65     HOWELL:          Digit 5 - Little Finger Active Range of Motion  Right Little Finger    Extension (deg) Flexion (deg) Pain   MCP   67     PIP   110     DIP   60     HOWELL:       Right  Strength  Right Hand Dynamometer Position: 2  Elbow Position Forearm Position Trial 1 (lbs) Trial 2  (lbs) Trial 3  (lbs) Average  (lbs) Pain   Flexed Neutral 45                   Treatment:  Therapeutic Exercise  TE 1: PROM: LLPS Ring and Small FIngers: MP flex x 20+ reps, Straight Fist,  Full Fist 15+ reps each with MP joint mobs and glides at times  TE 2: AAROM -Straight Fist x 20+ reps  Therapeutic Activity  TA 2: BTE- 45 sec each of the followin: wrist flex, 504 wrist ext, 504 fa sup, 504 fa pro, 302 wrist rd, 302 wrist ud 162: 3-jaw pinch (45 sec), 162: lateral pinch (30 sec), 162:  (60 sec)  TA 3: dynamic sustained :  moving small pegs with silver 2nd rung PHG (45#) x 3 sets    Time Entry(in minutes):  Paraffin Bath Time Entry: 10  Therapeutic  Activity Time Entry: 20  Therapeutic Exercise Time Entry: 23    Assessment & Plan       Assessment Details: Syed has been progressing in rom and strength, though deficits do remain. She had a f/u with PA-C yesterday and she was instructed to continue with OT to address remaining deficits. Progressed to BTE for strength, rom, and function.      Patient will continue to benefit from skilled outpatient occupational therapy to address the deficits listed in the problem list box on initial evaluation, provide pt/family education and to maximize pt's level of independence in the home and community environment.     Patient's spiritual, cultural, and educational needs considered and patient agreeable to plan of care and goals.          Goals:   Active       Long Term Goals       Pt will report a pain level of 2 out of 10 with ADLs/IADLs  (Progressing)       Start:  03/06/25    Expected End:  05/14/25            Pt will demo improved FOTO score by 40 points.  (Progressing)       Start:  03/06/25    Expected End:  05/14/25            Pt will return to prior level of function for ADLs /IADLs  (Progressing)       Start:  03/06/25    Expected End:  05/14/25            PT will show 20 degree increase in MP flex to improve grasp.during ADLs/IADLs.  (Progressing)       Start:  03/06/25    Expected End:  05/14/25                Ifeoma Johnson OT

## 2025-04-21 ENCOUNTER — OFFICE VISIT (OUTPATIENT)
Dept: INTERNAL MEDICINE | Facility: CLINIC | Age: 40
End: 2025-04-21
Payer: COMMERCIAL

## 2025-04-21 VITALS
OXYGEN SATURATION: 99 % | HEIGHT: 66 IN | WEIGHT: 207.88 LBS | DIASTOLIC BLOOD PRESSURE: 82 MMHG | SYSTOLIC BLOOD PRESSURE: 112 MMHG | BODY MASS INDEX: 33.41 KG/M2 | HEART RATE: 78 BPM

## 2025-04-21 DIAGNOSIS — J01.90 ACUTE SINUSITIS, RECURRENCE NOT SPECIFIED, UNSPECIFIED LOCATION: Primary | ICD-10-CM

## 2025-04-21 PROCEDURE — 3079F DIAST BP 80-89 MM HG: CPT | Mod: CPTII,S$GLB,, | Performed by: PHYSICIAN ASSISTANT

## 2025-04-21 PROCEDURE — 3074F SYST BP LT 130 MM HG: CPT | Mod: CPTII,S$GLB,, | Performed by: PHYSICIAN ASSISTANT

## 2025-04-21 PROCEDURE — 99214 OFFICE O/P EST MOD 30 MIN: CPT | Mod: S$GLB,,, | Performed by: PHYSICIAN ASSISTANT

## 2025-04-21 PROCEDURE — 99999 PR PBB SHADOW E&M-EST. PATIENT-LVL IV: CPT | Mod: PBBFAC,,, | Performed by: PHYSICIAN ASSISTANT

## 2025-04-21 PROCEDURE — 1159F MED LIST DOCD IN RCRD: CPT | Mod: CPTII,S$GLB,, | Performed by: PHYSICIAN ASSISTANT

## 2025-04-21 PROCEDURE — 3008F BODY MASS INDEX DOCD: CPT | Mod: CPTII,S$GLB,, | Performed by: PHYSICIAN ASSISTANT

## 2025-04-21 RX ORDER — ELAGOLIX 200 MG/1
TABLET, FILM COATED ORAL
COMMUNITY
Start: 2025-04-17

## 2025-04-21 RX ORDER — CETIRIZINE HYDROCHLORIDE 10 MG/1
10 TABLET ORAL DAILY
Qty: 30 TABLET | Refills: 0 | Status: SHIPPED | OUTPATIENT
Start: 2025-04-21 | End: 2025-05-21

## 2025-04-21 RX ORDER — DOXYCYCLINE 100 MG/1
100 CAPSULE ORAL 2 TIMES DAILY
Qty: 14 CAPSULE | Refills: 0 | Status: SHIPPED | OUTPATIENT
Start: 2025-04-21 | End: 2025-04-28

## 2025-04-21 RX ORDER — AZELASTINE 1 MG/ML
1 SPRAY, METERED NASAL 2 TIMES DAILY
Qty: 30 ML | Refills: 0 | Status: SHIPPED | OUTPATIENT
Start: 2025-04-21 | End: 2026-04-21

## 2025-04-21 RX ORDER — HYDROCODONE BITARTRATE AND ACETAMINOPHEN 5; 325 MG/1; MG/1
1 TABLET ORAL EVERY 6 HOURS PRN
COMMUNITY
Start: 2025-04-18

## 2025-04-21 RX ORDER — IBUPROFEN 800 MG/1
800 TABLET ORAL EVERY 6 HOURS PRN
COMMUNITY
Start: 2025-04-18

## 2025-04-21 NOTE — PROGRESS NOTES
INTERNAL MEDICINE URGENT VISIT NOTE    CHIEF COMPLAINT     Chief Complaint   Patient presents with    Nasal Congestion       HPI     Syed Lyon is a 39 y.o. female who presents for an urgent visit today.    PCP is Mariah Livingston MD, patient is new to me.     Patient with 1 month of nasal congestion, itching eyes, scratchy throat, productive cough with no fever, chills, nausea or vomiting.  She has tried multiple doses of over-the-counter medications including antihistamines, decongestants, anti-inflammatories, antipyretics, cough suppressant with no long-term relief.  She denies chest pain or shortness of breath.  She has no sick contacts or recent travel.  She is not up-to-date on flu vaccine, COVID vaccine or pneumonia vaccine.    Past Medical History:  Past Medical History:   Diagnosis Date    Abnormal Pap smear of cervix     cryo yrs. ago, then 3-2017 hpv positive, pap normal, 2017 colpo ECC normal,     Endometriosis     per dx lap Bad endo per pt    HPV (human papilloma virus) infection     Infertility, female 2015        Oral contraceptive use     PID (pelvic inflammatory disease)        Home Medications:  Prior to Admission medications    Medication Sig Start Date End Date Taking? Authorizing Provider   HYDROcodone-acetaminophen (NORCO) 5-325 mg per tablet Take 1 tablet by mouth every 6 (six) hours as needed. 4/18/25  Yes Provider, Historical   ibuprofen (ADVIL,MOTRIN) 800 MG tablet Take 800 mg by mouth every 6 (six) hours as needed. 4/18/25  Yes Provider, Historical   valACYclovir (VALTREX) 500 MG tablet TAKE 1 TABLET (500 MG TOTAL) BY MOUTH 2 (TWO) TIMES DAILY AS NEEDED (OUTBREAK). 2/3/23 4/21/25 Yes Mila Judd, JOSÉ MIGUEL   acyclovir 5% (ZOVIRAX) 5 % ointment Apply topically every 3 (three) hours.  Patient not taking: Reported on 4/21/2025 2/22/21   Palomo Winters MD   azelastine (ASTELIN) 137 mcg (0.1 %) nasal spray 1 spray (137 mcg total) by Nasal route 2 (two) times daily. 4/21/25  4/21/26  Pura Cleveland PA-C   bacitracin 500 unit/gram Oint Apply topically once daily. Apply with bandage changes.  Patient not taking: Reported on 4/21/2025 7/7/24   Ede Sahni MD   cetirizine (ZYRTEC) 10 MG tablet Take 1 tablet (10 mg total) by mouth once daily. 4/21/25 5/21/25  Pura Cleveland PA-C   clobetasoL (TEMOVATE) 0.05 % cream Apply topically 2 (two) times daily.  Patient not taking: Reported on 4/21/2025 5/8/23   Fermin Shelley DPM   diazePAM (VALIUM) 10 MG Tab  7/16/24   Provider, Historical   doxycycline (MONODOX) 100 MG capsule Take 1 capsule (100 mg total) by mouth 2 (two) times daily. for 7 days 4/21/25 4/28/25  Pura Cleveland PA-C   EPINEPHrine (EPIPEN) 0.3 mg/0.3 mL AtIn  7/21/20   Provider, Historical   EScitalopram oxalate (LEXAPRO) 10 MG tablet Take 1 tablet (10 mg total) by mouth once daily.  Patient not taking: Reported on 4/21/2025 3/11/25 4/10/25  Sisi Alonzo FNP   famotidine (PEPCID) 20 MG tablet Take 1 tablet (20 mg total) by mouth 2 (two) times daily. for 5 days  Patient not taking: Reported on 4/21/2025 5/24/24 5/29/24  Dorinda Thomason PA-C   fluticasone propionate (FLONASE) 50 mcg/actuation nasal spray  7/21/20   Provider, Historical   ketoconazole (NIZORAL) 2 % cream Apply topically daily as needed.  Patient not taking: Reported on 4/21/2025 5/3/23   Mariah Livingston MD   LIDOcaine (XYLOCAINE) 5 % Oint ointment Apply to affected area as directed  Patient not taking: Reported on 4/21/2025 8/29/24   Provider, Historical   mupirocin (BACTROBAN) 2 % ointment every other day. Apply to affected area  Patient not taking: Reported on 4/21/2025 7/17/24   Provider, Historical   ORILISSA 200 mg Tab Take by mouth.  Patient not taking: Reported on 4/21/2025 4/17/25   Provider, Historical   tiZANidine (ZANAFLEX) 4 MG tablet Take 1 tablet (4 mg total) by mouth nightly as needed (muscle pain).  Patient not taking: Reported on 4/21/2025 4/1/25 5/31/25  Lavon  "Inge ALAN NP       Review of Systems:  Review of Systems   Constitutional:  Negative for chills and fever.   HENT:  Positive for congestion, rhinorrhea, sinus pressure and sinus pain. Negative for sore throat (Scratchy throat) and trouble swallowing.    Eyes:  Negative for visual disturbance.   Respiratory:  Positive for cough. Negative for shortness of breath.    Cardiovascular:  Negative for chest pain.   Gastrointestinal:  Negative for abdominal pain, constipation, diarrhea, nausea and vomiting.   Genitourinary:  Negative for dysuria and flank pain.   Musculoskeletal:  Negative for back pain, neck pain and neck stiffness.   Skin:  Negative for rash.   Neurological:  Negative for dizziness, syncope, weakness and headaches.   Psychiatric/Behavioral:  Negative for confusion.        Health Maintainence:   Immunizations:  Health Maintenance         Date Due Completion Date    Pneumococcal Vaccines (Age 0-49) (1 of 2 - PCV) Never done ---    Cervical Cancer Screening 03/01/2024 3/1/2023    Influenza Vaccine (1) 09/01/2024 9/24/2019    COVID-19 Vaccine (4 - 2024-25 season) 09/01/2024 1/3/2022    Hemoglobin A1c (Diabetic Prevention Screening) 01/31/2027 1/31/2024    TETANUS VACCINE 07/07/2034 7/7/2024    RSV Vaccine (Age 60+ and Pregnant patients) (1 - 1-dose 75+ series) 11/07/2061 ---             PHYSICAL EXAM     /82 (BP Location: Left arm, Patient Position: Sitting)   Pulse 78   Ht 5' 6" (1.676 m)   Wt 94.3 kg (207 lb 14.3 oz)   SpO2 99%   BMI 33.55 kg/m²     Physical Exam  Vitals and nursing note reviewed.   Constitutional:       Appearance: Normal appearance.      Comments: Healthy-appearing female in no acute distress or apparent pain.  She makes good eye contact, speaks in clear full sentences and ambulates with ease.   HENT:      Head: Normocephalic and atraumatic.      Nose: Nose normal.      Mouth/Throat:      Pharynx: Oropharynx is clear.   Eyes:      Conjunctiva/sclera: Conjunctivae normal. " "  Cardiovascular:      Rate and Rhythm: Normal rate and regular rhythm.      Pulses: Normal pulses.   Pulmonary:      Effort: No respiratory distress.   Abdominal:      Tenderness: There is no abdominal tenderness.   Musculoskeletal:         General: Normal range of motion.      Cervical back: No rigidity.   Skin:     General: Skin is warm and dry.      Capillary Refill: Capillary refill takes less than 2 seconds.      Findings: No rash.   Neurological:      General: No focal deficit present.      Mental Status: She is alert.      Gait: Gait normal.   Psychiatric:         Mood and Affect: Mood normal.         LABS     No results found for: "LABA1C", "HGBA1C"  CMP  Sodium   Date Value Ref Range Status   02/10/2025 141 136 - 145 mmol/L Final     Potassium   Date Value Ref Range Status   02/10/2025 4.1 3.5 - 5.1 mmol/L Final     Chloride   Date Value Ref Range Status   02/10/2025 107 95 - 110 mmol/L Final     CO2   Date Value Ref Range Status   02/10/2025 22 (L) 23 - 29 mmol/L Final     Glucose   Date Value Ref Range Status   02/10/2025 86 70 - 110 mg/dL Final     BUN   Date Value Ref Range Status   02/10/2025 17 6 - 20 mg/dL Final     Creatinine   Date Value Ref Range Status   02/10/2025 0.8 0.5 - 1.4 mg/dL Final     Calcium   Date Value Ref Range Status   02/10/2025 9.5 8.7 - 10.5 mg/dL Final     Total Protein   Date Value Ref Range Status   02/10/2025 7.9 6.0 - 8.4 g/dL Final     Albumin   Date Value Ref Range Status   02/10/2025 4.3 3.5 - 5.2 g/dL Final     Total Bilirubin   Date Value Ref Range Status   02/10/2025 1.0 0.1 - 1.0 mg/dL Final     Comment:     For infants and newborns, interpretation of results should be based  on gestational age, weight and in agreement with clinical  observations.    Premature Infant recommended reference ranges:  Up to 24 hours.............<8.0 mg/dL  Up to 48 hours............<12.0 mg/dL  3-5 days..................<15.0 mg/dL  6-29 days.................<15.0 mg/dL       Alkaline " Phosphatase   Date Value Ref Range Status   02/10/2025 44 40 - 150 U/L Final     AST   Date Value Ref Range Status   02/10/2025 14 10 - 40 U/L Final     ALT   Date Value Ref Range Status   02/10/2025 13 10 - 44 U/L Final     Anion Gap   Date Value Ref Range Status   02/10/2025 12 8 - 16 mmol/L Final     eGFR if    Date Value Ref Range Status   06/28/2022 >60.0 >60 mL/min/1.73 m^2 Final     eGFR if non    Date Value Ref Range Status   06/28/2022 >60.0 >60 mL/min/1.73 m^2 Final     Comment:     Calculation used to obtain the estimated glomerular filtration  rate (eGFR) is the CKD-EPI equation.        Lab Results   Component Value Date    WBC 5.73 02/10/2025    HGB 12.0 02/10/2025    HCT 39.4 02/10/2025    MCV 74 (L) 02/10/2025     02/10/2025     Lab Results   Component Value Date    CHOL 161 08/12/2024    CHOL 181 05/04/2023    CHOL 154 06/28/2022     Lab Results   Component Value Date    HDL 62 08/12/2024    HDL 60 05/04/2023    HDL 56 06/28/2022     Lab Results   Component Value Date    LDLCALC 81.4 08/12/2024    LDLCALC 108.2 05/04/2023    LDLCALC 85.6 06/28/2022     Lab Results   Component Value Date    TRIG 88 08/12/2024    TRIG 64 05/04/2023    TRIG 62 06/28/2022     Lab Results   Component Value Date    CHOLHDL 38.5 08/12/2024    CHOLHDL 33.1 05/04/2023    CHOLHDL 36.4 06/28/2022     Lab Results   Component Value Date    TSH 0.494 02/10/2025       ASSESSMENT/PLAN     Amaurypenny Lyon is a 39 y.o. female     Syed was seen today for nasal congestion, because of length of symptoms will cover with antibiotics and recommend daily Zyrtec and Astelin.  She is aware of ED prompts.    Diagnoses and all orders for this visit:    Acute sinusitis, recurrence not specified, unspecified location    Other orders  -     doxycycline (MONODOX) 100 MG capsule; Take 1 capsule (100 mg total) by mouth 2 (two) times daily. for 7 days  -     azelastine (ASTELIN) 137 mcg (0.1 %) nasal spray; 1  spray (137 mcg total) by Nasal route 2 (two) times daily.  -     cetirizine (ZYRTEC) 10 MG tablet; Take 1 tablet (10 mg total) by mouth once daily.      Patient was counseled on when and how to seek emergent care.       Pura Cleveland PA-C   Department of Internal Medicine - [facility]   3:32 PM

## 2025-04-23 ENCOUNTER — CLINICAL SUPPORT (OUTPATIENT)
Dept: REHABILITATION | Facility: HOSPITAL | Age: 40
End: 2025-04-23
Payer: COMMERCIAL

## 2025-04-23 DIAGNOSIS — M79.18 MYOFASCIAL PAIN: ICD-10-CM

## 2025-04-23 DIAGNOSIS — M79.641 RIGHT HAND PAIN: Primary | ICD-10-CM

## 2025-04-23 DIAGNOSIS — M62.81 MUSCLE WEAKNESS: ICD-10-CM

## 2025-04-23 DIAGNOSIS — M25.641 STIFFNESS OF FINGER JOINT OF RIGHT HAND: ICD-10-CM

## 2025-04-23 PROCEDURE — 97530 THERAPEUTIC ACTIVITIES: CPT

## 2025-04-23 PROCEDURE — 97110 THERAPEUTIC EXERCISES: CPT

## 2025-04-23 PROCEDURE — 97018 PARAFFIN BATH THERAPY: CPT

## 2025-04-23 RX ORDER — TIZANIDINE 4 MG/1
TABLET ORAL
Qty: 90 TABLET | Refills: 1 | Status: SHIPPED | OUTPATIENT
Start: 2025-04-23

## 2025-04-23 NOTE — PROGRESS NOTES
Outpatient Rehab    Occupational Therapy Visit    Patient Name: Syed Lyon  MRN: [id number]  YOB: 1985  Encounter Date: 2025    Therapy Diagnosis:   Encounter Diagnoses   Name Primary?    Right hand pain Yes    Stiffness of finger joint of right hand     Muscle weakness      Physician: Gina Alcantar PA-C    Physician Orders: Eval and Treat  Medical Diagnosis: Displaced fracture of shaft of fifth metacarpal bone of right hand with delayed healing    Visit # / Visits Authorized: 35 / 40  Insurance Authorization Period: 2025 to 2025  Date of Evaluation:  2025    Plan of Care Certification:  2025 to 2025                    Time In: 0800   Time Out: 0900  Total Time: 60   Total Billable Time: 55    FOTO:  Intake Score: 38%  Survey Score 1: 71%  Survey Score 2: 60%         Subjective   No new complaints today..    Not rated today.    Objective           Treatment:  Therapeutic Exercise  TE 1: PROM: LLPS Ring and Small FIngers: MP flex x 20+ reps, Straight Fist,  Full Fist 15+ reps each with MP joint mobs and glides at times  TE 2: AAROM -Straight Fist, Full Fist x 20+ reps each  Therapeutic Activity  TA 1: Blue power web: weight bearing open hand & fists and pulls20 reps ea  TA 2: BTE- 45 sec each of the followin: wrist flex, 504 wrist ext, 504 fa sup, 504 fa pro, 302 wrist rd, 302 wrist ud 162: 3-jaw pinch (45 sec), 162: lateral pinch (30 sec), 162:  (60 sec)  TA 3: dynamic sustained :  moving small pegs with silver 2nd rung PHG (45#) x 3 sets    Time Entry(in minutes):  Paraffin Bath Time Entry: 10  Therapeutic Activity Time Entry: 30  Therapeutic Exercise Time Entry: 15    Assessment & Plan   Assessment:    Evaluation/Treatment Tolerance: Patient tolerated treatment well    Patient will continue to benefit from skilled outpatient occupational therapy to address the deficits listed in the problem list box on initial evaluation, provide pt/family  education and to maximize pt's level of independence in the home and community environment.     Patient's spiritual, cultural, and educational needs considered and patient agreeable to plan of care and goals.           Plan: Continue with POC.    Goals:   Active       Long Term Goals       Pt will report a pain level of 2 out of 10 with ADLs/IADLs  (Progressing)       Start:  03/06/25    Expected End:  05/14/25            Pt will demo improved FOTO score by 40 points.  (Progressing)       Start:  03/06/25    Expected End:  05/14/25            Pt will return to prior level of function for ADLs /IADLs  (Progressing)       Start:  03/06/25    Expected End:  05/14/25            PT will show 20 degree increase in MP flex to improve grasp.during ADLs/IADLs.  (Progressing)       Start:  03/06/25    Expected End:  05/14/25                Ifeoma Johnson OT

## 2025-04-25 ENCOUNTER — CLINICAL SUPPORT (OUTPATIENT)
Dept: REHABILITATION | Facility: HOSPITAL | Age: 40
End: 2025-04-25
Payer: COMMERCIAL

## 2025-04-25 DIAGNOSIS — M79.641 RIGHT HAND PAIN: Primary | ICD-10-CM

## 2025-04-25 DIAGNOSIS — M25.641 STIFFNESS OF FINGER JOINT OF RIGHT HAND: ICD-10-CM

## 2025-04-25 DIAGNOSIS — M62.81 MUSCLE WEAKNESS: ICD-10-CM

## 2025-04-25 PROCEDURE — 97110 THERAPEUTIC EXERCISES: CPT | Mod: CO

## 2025-04-25 PROCEDURE — 97530 THERAPEUTIC ACTIVITIES: CPT | Mod: CO

## 2025-04-25 PROCEDURE — 97018 PARAFFIN BATH THERAPY: CPT | Mod: CO

## 2025-04-25 NOTE — PROGRESS NOTES
Outpatient Rehab    Occupational Therapy Visit    Patient Name: Syed Lyon  MRN: [id number]  YOB: 1985  Encounter Date: 2025    Therapy Diagnosis:   Encounter Diagnoses   Name Primary?    Right hand pain Yes    Stiffness of finger joint of right hand     Muscle weakness      Physician: Gina Alcantar PA-C    Physician Orders: Eval and Treat  Medical Diagnosis: Displaced fracture of shaft of fifth metacarpal bone of right hand with delayed healing    Visit # / Visits Authorized: 37 / 40  Insurance Authorization Period: 2025 to 2025  Date of Evaluation:  2025  Plan of Care Certification:  2025 to      Time In: 0756   Time Out: 0846  Total Time: 50   Total Billable Time:      FOTO:  Intake Score:  %  Survey Score 1:  %  Survey Score 2:  %         Subjective   doing well.         Objective           Treatment:  Therapeutic Exercise  TE 1: PROM: LLPS Ring and Small FIngers: MP flex x 20+ reps, Straight Fist,  Full Fist 15+ reps each with MP joint mobs and glides at times  Therapeutic Activity  TA 1: Blue power web: weight bearing open hand & fists and pulls20 reps ea  TA 2: BTE- 45 sec each of the followin: wrist flex, 504 wrist ext, 504 fa sup, 504 fa pro, 302 wrist rd, 302 wrist ud 162: 3-jaw pinch (45 sec), 162: lateral pinch (30 sec), 162:  (60 sec)  TA 3: dynamic sustained :  moving small pegs with silver 2nd rung PHG (45#) x 3 sets  Modalities  Paraffin Bath: Patient received paraffin bath with moist hot pack,for to increase blood flow, circulation, pain management and for tissue elasticity prior to therex    Time Entry(in minutes):  Paraffin Bath Time Entry: 10  Therapeutic Activity Time Entry: 25  Therapeutic Exercise Time Entry: 15    Assessment & Plan   Assessment:    Evaluation/Treatment Tolerance: Patient tolerated treatment well    Patient will continue to benefit from skilled outpatient occupational therapy to address the deficits listed  in the problem list box on initial evaluation, provide pt/family education and to maximize pt's level of independence in the home and community environment.     Patient's spiritual, cultural, and educational needs considered and patient agreeable to plan of care and goals.           Plan: Continue with POC.    Goals:   Active       Long Term Goals       Pt will report a pain level of 2 out of 10 with ADLs/IADLs  (Progressing)       Start:  03/06/25    Expected End:  05/14/25            Pt will demo improved FOTO score by 40 points.  (Progressing)       Start:  03/06/25    Expected End:  05/14/25            Pt will return to prior level of function for ADLs /IADLs  (Progressing)       Start:  03/06/25    Expected End:  05/14/25            PT will show 20 degree increase in MP flex to improve grasp.during ADLs/IADLs.  (Progressing)       Start:  03/06/25    Expected End:  05/14/25                ALICIA Oneal

## 2025-04-28 ENCOUNTER — CLINICAL SUPPORT (OUTPATIENT)
Dept: REHABILITATION | Facility: HOSPITAL | Age: 40
End: 2025-04-28
Payer: COMMERCIAL

## 2025-04-28 DIAGNOSIS — M25.641 STIFFNESS OF FINGER JOINT OF RIGHT HAND: ICD-10-CM

## 2025-04-28 DIAGNOSIS — M79.641 RIGHT HAND PAIN: Primary | ICD-10-CM

## 2025-04-28 DIAGNOSIS — M62.81 MUSCLE WEAKNESS: ICD-10-CM

## 2025-04-28 PROCEDURE — 97110 THERAPEUTIC EXERCISES: CPT

## 2025-04-28 PROCEDURE — 97530 THERAPEUTIC ACTIVITIES: CPT

## 2025-04-28 PROCEDURE — 97022 WHIRLPOOL THERAPY: CPT

## 2025-04-28 NOTE — PROGRESS NOTES
"  Outpatient Rehab    Occupational Therapy Visit    Patient Name: Syed Lyon  MRN: [id number]  YOB: 1985  Encounter Date: 4/28/2025    Therapy Diagnosis:   Encounter Diagnoses   Name Primary?    Right hand pain Yes    Stiffness of finger joint of right hand     Muscle weakness      Physician: Gina Alcantar PA-C    Physician Orders: Eval and Treat  Medical Diagnosis: Displaced fracture of shaft of fifth metacarpal bone of right hand with delayed healing    Visit # / Visits Authorized: 37 / 40  Insurance Authorization Period: 1/1/2025 to 12/31/2025  Date of Evaluation:  2/4/2025  Plan of Care Certification: 4/16/2025  to 05/14/25      Time In: 0808   Time Out: 0900  Total Time: 52   Total Billable Time: 50    FOTO:  Intake Score: 38%  Survey Score 1: 71%  Survey Score 2: 60%         Subjective   Reports that she is able to  use the right hand however is "afraid to use" at times. Reports that has not been weight bearing on the right hand. Reports that "It hurts a little" and "It's stiff.".  Pain reported as 2/10. 2/10 with flexion and with use of the right hand.    Objective      Digit 4 - Ring Finger Active Range of Motion  Right Ring Finger   Extension (deg) Flexion (deg) Pain   MCP 0 84     PIP 0 102 Yes   DIP 0 72 Yes   HOWELL: 258      Digit 5 - Little Finger Active Range of Motion  Right Little Finger   Extension (deg) Flexion (deg) Pain   MCP 0 67     PIP 0 106 Yes   DIP 0 49 Yes   HOWELL: 222                        Right  Strength  Right Hand Dynamometer Position: 2  Elbow Position Forearm Position Trial 1 (lbs) Trial 2  (lbs) Trial 3  (lbs) Average  (lbs) Pain   Flexed Neutral 36       Yes   (Above is average of 3 trials using electronic dynamometer.)    Left  Strength  Left Hand Dynamometer Position: 2  Elbow Position Forearm Position Trial 1 (lbs) Trial 2 (lbs) Trial 3 (lbs) Average (lbs) Pain   Flexed Neutral 35             (Above is average of 3 trials using electronic " dynamometer.)         Treatment:  Therapeutic Exercise  TE 1: PROM: LLPS Ring and Small FIngers: MP flex x 20+ reps, Straight Fist,  Full Fist 15+ reps each with MP joint mobs and glides at times  TE 4: Reassessment of AROM and  strength  Therapeutic Activity  TA 2: BTE- 45 sec each of the followin: wrist flex, 504 wrist ext, 504 fa sup, 504 fa pro, 302 wrist rd, 302 wrist ud 162: 3-jaw pinch (45 sec), 162: lateral pinch (30 sec), 162:  (60 sec)    Time Entry(in minutes):  Whirlpool Time Entry: 10    Assessment & Plan   Assessment: Formal reassessment of arom reveals good increase in ring finger flexion, however, rom SF shows regression.  strength has regressed, however, bilaterally, for unknown reason. Pt reports increase in activity on Thursday and yesterday. C/o pain in IP's of the SF persists. Instructed to stop arom and continue with self PROM and wear of static progressive MP flexion orthosis. She was instructed to stop putty HEP as well, all in attempt to reduce SF IP pain.       Patient will continue to benefit from skilled outpatient occupational therapy to address the deficits listed in the problem list box on initial evaluation, provide pt/family education and to maximize pt's level of independence in the home and community environment.     Patient's spiritual, cultural, and educational needs considered and patient agreeable to plan of care and goals.           Plan: Continue with POC.    Goals:   Active       Long Term Goals       Pt will report a pain level of 2 out of 10 with ADLs/IADLs  (Progressing)       Start:  25    Expected End:  25            Pt will demo improved FOTO score by 40 points.  (Progressing)       Start:  25    Expected End:  25            Pt will return to prior level of function for ADLs /IADLs  (Progressing)       Start:  25    Expected End:  25            PT will show 20 degree increase in MP flex to improve grasp.during  ADLs/IADLs.  (Progressing)       Start:  03/06/25    Expected End:  05/14/25                Ifeoma Johnson OT

## 2025-04-30 ENCOUNTER — CLINICAL SUPPORT (OUTPATIENT)
Dept: REHABILITATION | Facility: HOSPITAL | Age: 40
End: 2025-04-30
Payer: COMMERCIAL

## 2025-04-30 DIAGNOSIS — M25.641 STIFFNESS OF FINGER JOINT OF RIGHT HAND: ICD-10-CM

## 2025-04-30 DIAGNOSIS — M79.641 RIGHT HAND PAIN: Primary | ICD-10-CM

## 2025-04-30 DIAGNOSIS — M62.81 MUSCLE WEAKNESS: ICD-10-CM

## 2025-04-30 DIAGNOSIS — S62.326G CLOSED DISPLACED FRACTURE OF SHAFT OF FIFTH METACARPAL BONE OF RIGHT HAND WITH DELAYED HEALING, SUBSEQUENT ENCOUNTER: ICD-10-CM

## 2025-04-30 PROCEDURE — 97110 THERAPEUTIC EXERCISES: CPT

## 2025-04-30 PROCEDURE — 97530 THERAPEUTIC ACTIVITIES: CPT

## 2025-04-30 PROCEDURE — 97022 WHIRLPOOL THERAPY: CPT

## 2025-04-30 NOTE — PROGRESS NOTES
Outpatient Rehab    Occupational Therapy Visit    Patient Name: Syed Lyon  MRN: [id number]  YOB: 1985  Encounter Date: 2025    Therapy Diagnosis:   Encounter Diagnoses   Name Primary?    Right hand pain Yes    Stiffness of finger joint of right hand     Muscle weakness      Physician: Gina Alcantar PA-C    Physician Orders: Eval and Treat  Medical Diagnosis: Displaced fracture of shaft of fifth metacarpal bone of right hand with delayed healing    Visit # / Visits Authorized: 38 / 40  Insurance Authorization Period: 2025 to 2025  Date of Evaluation:  2025  Plan of Care Certification:  2025 to 2025     Time In: 0805   Time Out: 0855  Total Time: 50   Total Billable Time: 45    FOTO:  Intake Score: 38%  Survey Score 1: 71%  Survey Score 2: 60%         Subjective   Pt reports pain in IPs is decreasing..  Pain reported as 1/10. 1/10 with flexion and with use of the right hand.    Objective           Treatment:  Therapeutic Exercise  TE 1: PROM: LLPS Ring and Small FIngers: MP flex x 15+ reps, Straight Fist x 10 reps,  Full Fist 10+  Therapeutic Activity  TA 1: BTE- 45 sec each of the followin: wrist flex, 504 wrist ext, 504 fa sup, 504 fa pro, 302 wrist rd, 302 wrist ud 162: 3-jaw pinch (45 sec), 162: lateral pinch (30 sec), 162:  (60 sec)  TA 2: green  flexbar twisting: FA sup, fa pro, wrist flex, wrist ext, wrist rd, wrist ud 20 reps each    Time Entry(in minutes):  Whirlpool Time Entry: 10  Therapeutic Activity Time Entry: 25  Therapeutic Exercise Time Entry: 10    Assessment & Plan   Assessment: Report of some decrease in IP joint pain with the stopping of A/AAROM of the fingers and the putty HEP.  Evaluation/Treatment Tolerance: Patient tolerated treatment well    Patient will continue to benefit from skilled outpatient occupational therapy to address the deficits listed in the problem list box on initial evaluation, provide pt/family education and to  maximize pt's level of independence in the home and community environment.     Patient's spiritual, cultural, and educational needs considered and patient agreeable to plan of care and goals.           Plan: Reassess next week. Appears to be reaching a plateau with OT. Considering d/c with HEP  soone.    Goals:   Active       Long Term Goals       Pt will report a pain level of 2 out of 10 with ADLs/IADLs  (Progressing)       Start:  03/06/25    Expected End:  05/14/25            Pt will demo improved FOTO score by 40 points.  (Progressing)       Start:  03/06/25    Expected End:  05/14/25            Pt will return to prior level of function for ADLs /IADLs  (Progressing)       Start:  03/06/25    Expected End:  05/14/25            PT will show 20 degree increase in MP flex to improve grasp.during ADLs/IADLs.  (Progressing)       Start:  03/06/25    Expected End:  05/14/25                Ifeoma Johnson, OT

## 2025-05-13 RX ORDER — CETIRIZINE HYDROCHLORIDE 10 MG/1
10 TABLET ORAL
Qty: 90 TABLET | Refills: 1 | Status: SHIPPED | OUTPATIENT
Start: 2025-05-13

## 2025-05-15 ENCOUNTER — PATIENT MESSAGE (OUTPATIENT)
Dept: PSYCHIATRY | Facility: CLINIC | Age: 40
End: 2025-05-15
Payer: COMMERCIAL

## 2025-05-15 ENCOUNTER — OFFICE VISIT (OUTPATIENT)
Dept: PSYCHIATRY | Facility: CLINIC | Age: 40
End: 2025-05-15
Payer: COMMERCIAL

## 2025-05-15 ENCOUNTER — OFFICE VISIT (OUTPATIENT)
Dept: OBSTETRICS AND GYNECOLOGY | Facility: CLINIC | Age: 40
End: 2025-05-15
Payer: COMMERCIAL

## 2025-05-15 VITALS — BODY MASS INDEX: 32.95 KG/M2 | HEIGHT: 66 IN | WEIGHT: 205 LBS

## 2025-05-15 VITALS
WEIGHT: 202.94 LBS | SYSTOLIC BLOOD PRESSURE: 130 MMHG | BODY MASS INDEX: 32.75 KG/M2 | HEART RATE: 83 BPM | DIASTOLIC BLOOD PRESSURE: 84 MMHG

## 2025-05-15 DIAGNOSIS — F41.1 GENERALIZED ANXIETY DISORDER: ICD-10-CM

## 2025-05-15 DIAGNOSIS — Z12.31 BREAST CANCER SCREENING BY MAMMOGRAM: ICD-10-CM

## 2025-05-15 DIAGNOSIS — F41.9 ANXIETY: ICD-10-CM

## 2025-05-15 DIAGNOSIS — Z01.419 WELL WOMAN EXAM: ICD-10-CM

## 2025-05-15 DIAGNOSIS — Z01.419 ENCOUNTER FOR GYNECOLOGICAL EXAMINATION: Primary | ICD-10-CM

## 2025-05-15 PROCEDURE — 3008F BODY MASS INDEX DOCD: CPT | Mod: CPTII,S$GLB,, | Performed by: OBSTETRICS & GYNECOLOGY

## 2025-05-15 PROCEDURE — 1159F MED LIST DOCD IN RCRD: CPT | Mod: CPTII,S$GLB,, | Performed by: OBSTETRICS & GYNECOLOGY

## 2025-05-15 PROCEDURE — 99999 PR PBB SHADOW E&M-EST. PATIENT-LVL II: CPT | Mod: PBBFAC,,, | Performed by: FAMILY MEDICINE

## 2025-05-15 PROCEDURE — 99395 PREV VISIT EST AGE 18-39: CPT | Mod: S$GLB,,, | Performed by: OBSTETRICS & GYNECOLOGY

## 2025-05-15 PROCEDURE — 3079F DIAST BP 80-89 MM HG: CPT | Mod: CPTII,S$GLB,, | Performed by: FAMILY MEDICINE

## 2025-05-15 PROCEDURE — 3008F BODY MASS INDEX DOCD: CPT | Mod: CPTII,S$GLB,, | Performed by: FAMILY MEDICINE

## 2025-05-15 PROCEDURE — 99999 PR PBB SHADOW E&M-EST. PATIENT-LVL III: CPT | Mod: PBBFAC,,, | Performed by: OBSTETRICS & GYNECOLOGY

## 2025-05-15 PROCEDURE — 99215 OFFICE O/P EST HI 40 MIN: CPT | Mod: S$GLB,,, | Performed by: FAMILY MEDICINE

## 2025-05-15 PROCEDURE — 90875 PSYCHOPHYSIOLOGICAL THERAPY: CPT | Mod: S$GLB,,, | Performed by: FAMILY MEDICINE

## 2025-05-15 PROCEDURE — 3075F SYST BP GE 130 - 139MM HG: CPT | Mod: CPTII,S$GLB,, | Performed by: FAMILY MEDICINE

## 2025-05-15 RX ORDER — ESCITALOPRAM OXALATE 10 MG/1
10 TABLET ORAL DAILY
Qty: 30 TABLET | Refills: 3 | Status: SHIPPED | OUTPATIENT
Start: 2025-05-15 | End: 2025-09-12

## 2025-05-15 NOTE — PROGRESS NOTES
Chief Complaint: well woman exam  Annual Exam    She is established    Syed Lyon is a 39 y.o. female  presents for a well woman exam.    39 year old currently seeing [facility] and on [place].  Is about to undergo IVF cycle with donor egg.  This will be her last round.  Daughter is now 8 years old.  Her mother passed away of an acute MI in January.    ROS:  No abdominal pain. No vaginal discharge  No breast pain or masses, No rectal bleeding     LMP: 2 weeks ago  Contraception: The current method of family planning is none  Meds per MD: none  From  note:  She has a history of infertility requiring IVF treatment in  with conception after her 2nd cycle. She was seen by [facility] in March for workup for IVF in hopes of having another child.  She was told that due to her low AMH, elevated FSH levels, and results of ultrasound that she did not recommend moving forward with IVF.   Prolactin 15.2 ng/mL  Thyroid perioxidase (TPO) H 49 IU/mL  TSH 0.769 uIU/mL  T4 Free (direct) 1.17 ng/dL  Vit D (L) 17.0 ng/mL - started taking Vit D3 supplements since labs were drawn  AMH 0.077 ng/mL  FSH 24.19 mIU/ml  Estradiol 22.94 ph/mL     Last Pap: 3-2023 pap & hpv negative  Last MMG: 3-8-2023 birads 0,  right breast bx. Benign  S/p breast bx   Final Pathologic Diagnosis Right breast 9:00 5 cm FN, biopsy:  Cystic papillary apocrine metaplasia  Negative for atypia or malignancy       Past Medical History:   Diagnosis Date    Abnormal Pap smear of cervix     cryo yrs. ago, then 3- hpv positive, pap normal, 2017 colpo ECC normal,     Endometriosis     per dx lap Bad endo per pt    HPV (human papilloma virus) infection     Infertility, female     , IVF    Oral contraceptive use     PID (pelvic inflammatory disease)        Past Surgical History:   Procedure Laterality Date     SECTION      COLONOSCOPY N/A 2017    Procedure: COLONOSCOPY;  Surgeon: Jose Zaidi MD;   Location: [facility] ENDO (4TH FLR);  Service: Endoscopy;  Laterality: N/A;    fallopian tube removal Bilateral     GYNECOLOGIC CRYOSURGERY      HYSTEROSCOPY      2015    In Vitro      Dr Anuja Santana    INJECTION OF ANESTHETIC AGENT AROUND NERVE Right 2025    Procedure: BLOCK, NERVE RIGHT STELLATE GANGLION 1 OF 3;  Surgeon: Mao Guerrier MD;  Location: [facility] PAIN MGT;  Service: Pain Management;  Laterality: Right;  *1 WK APART    INJECTION OF ANESTHETIC AGENT AROUND NERVE Right 2025    Procedure: BLOCK, NERVE RIGHT STELLATE GAMGLION 2 OF 3;  Surgeon: Mao Guerrier MD;  Location: [facility] PAIN MGT;  Service: Pain Management;  Laterality: Right;    INJECTION OF ANESTHETIC AGENT AROUND NERVE Right 3/14/2025    Procedure: BLOCK, NERVE RIGHT STELLATE GANGLION 3 OF 3;  Surgeon: Mao Guerrier MD;  Location: [facility] PAIN MGT;  Service: Pain Management;  Laterality: Right;  2 WK F/U STEPHANE    INJECTION OF BOTULINUM TOXIN TYPE A N/A 2022    Procedure: INJECTION, BOTULINUM TOXIN, TYPE A;  Surgeon: RADHA Bahena MD;  Location: [facility] OR 2ND FLR;  Service: Colon and Rectal;  Laterality: N/A;    PELVIC EXAMINATION UNDER ANESTHESIA N/A 2022    Procedure: EXAM UNDER ANESTHESIA, PELVIS;  Surgeon: RADHA Bahena MD;  Location: [facility] OR 2ND FLR;  Service: Colon and Rectal;  Laterality: N/A;       OB History    Para Term  AB Living   2 2 1 1  1   SAB IAB Ectopic Multiple Live Births      0 1      # Outcome Date GA Lbr Dustin/2nd Weight Sex Type Anes PTL Lv   2  16 35w5d  2.608 kg (5 lb 12 oz) F CS-LTranv Spinal N OCTAVIANO   1 Term                Family History   Problem Relation Name Age of Onset    Heart disease Father      No Known Problems Mother      No Known Problems Daughter      Meningitis Brother      Breast cancer Neg Hx      Colon cancer Neg Hx      Ovarian cancer Neg Hx      Cancer Neg Hx      Colon polyps Neg Hx      Celiac disease Neg Hx      Esophageal cancer Neg Hx    "   Stomach cancer Neg Hx      Irritable bowel syndrome Neg Hx      Inflammatory bowel disease Neg Hx         Social History[1]      Physical Exam:  Ht 5' 6" (1.676 m)   Wt 93 kg (205 lb 0.4 oz)   BMI 33.09 kg/m²     APPEARANCE: Well nourished, well developed, in no acute distress.  AFFECT: WNL, alert and oriented x 3  SKIN: No acne or hirsutism  BREASTS: Symmetrical, no skin changes.                      No nipple discharge.   No palpable masses bilaterally  NODES: No inguinal nor axillary LAD  ABDOMEN: soft Non tender Non distended No masses  PELVIC: Female chaperone was present in the room during pelvic exam.  Normal external genitalia without lesions.  Normal hair distribution.   Adequate perineal body, normal urethral meatus.   No signif cystocele or rectocele.  Vagina moist and well rugated without lesions or discharge.    Cervix pink, without lesions, discharge or tenderness.     PAP performed   Bimanual exam shows uterus to be normal size, regular, mobile and nontender.    Adnexa without masses or tenderness.    EXTREMITIES: No edema.        ASSESSMENT AND PLAN  Syed was seen today for annual exam.    Diagnoses and all orders for this visit:    Encounter for gynecological examination    Well woman exam/ Encounter for well woman exam with routine gynecological exam      -PAP today  -normal exam   -MMG we will schedule follow up mammogram.         -  BP normotensive              -patient perimenopausal.  With low AMH and elevated FSH   -if IVF cycle does not take patient will follow-up for routine follow-up and develop a game plan moving forward for perimenopause              Patient was counseled today on A.C.S. Pap guidelines and recommendations for yearly pelvic exams, mammograms and monthly self breast exams; to see her PCP for other health maintenance.     Patient encouraged to register for portal and results will be sent via portal.       Health Maintenance   Topic Date Due    Pneumococcal Vaccines " (Age 0-49) (1 of 2 - PCV) Never done    Hemoglobin A1c (Diabetic Prevention Screening)  Never done    Cervical Cancer Screening  03/01/2024    COVID-19 Vaccine (4 - 2024-25 season) 09/01/2024    Influenza Vaccine (Season Ended) 09/01/2025    TETANUS VACCINE  07/07/2034    RSV Vaccine (Age 60+ and Pregnant patients) (1 - 1-dose 75+ series) 11/07/2061    Hepatitis C Screening  Completed    HIV Screening  Completed    Lipid Panel  Completed              [1]   Social History  Tobacco Use    Smoking status: Never    Smokeless tobacco: Never   Substance Use Topics    Alcohol use: Yes     Comment: occasional    Drug use: No

## 2025-05-15 NOTE — PROGRESS NOTES
LMP: No LMP recorded..    Contraception: The current method of family planning is none  Meds per MD: none    Last Pap: 3-2023 pap & hpv negative  Last MMG: 3-8-2023 birads 0,  right breast bx. benign
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
## 2025-05-21 ENCOUNTER — HOSPITAL ENCOUNTER (OUTPATIENT)
Dept: RADIOLOGY | Facility: HOSPITAL | Age: 40
Discharge: HOME | End: 2025-05-21
Attending: OBSTETRICS & GYNECOLOGY
Payer: COMMERCIAL

## 2025-05-21 VITALS — HEIGHT: 66 IN | WEIGHT: 202 LBS | BODY MASS INDEX: 32.47 KG/M2

## 2025-05-21 DIAGNOSIS — Z12.31 BREAST CANCER SCREENING BY MAMMOGRAM: ICD-10-CM

## 2025-05-21 PROCEDURE — 77067 SCR MAMMO BI INCL CAD: CPT | Mod: TC

## 2025-05-21 PROCEDURE — 77067 SCR MAMMO BI INCL CAD: CPT | Mod: 26,,, | Performed by: RADIOLOGY

## 2025-05-21 PROCEDURE — 77063 BREAST TOMOSYNTHESIS BI: CPT | Mod: 26,,, | Performed by: RADIOLOGY

## 2025-05-22 ENCOUNTER — RESULTS FOLLOW-UP (OUTPATIENT)
Dept: OBSTETRICS AND GYNECOLOGY | Facility: CLINIC | Age: 40
End: 2025-05-22

## 2025-08-07 ENCOUNTER — PATIENT MESSAGE (OUTPATIENT)
Dept: OBSTETRICS AND GYNECOLOGY | Facility: CLINIC | Age: 40
End: 2025-08-07
Payer: COMMERCIAL

## 2025-08-08 RX ORDER — DROSPIRENONE AND ESTETROL 3-14.2(28)
1 KIT ORAL DAILY
Qty: 84 TABLET | Refills: 3 | Status: SHIPPED | OUTPATIENT
Start: 2025-08-08

## 2025-08-08 NOTE — TELEPHONE ENCOUNTER
Pt states she has history of endo & would like to get back on ocp's since not doing IVF any longer.  Thanks,  Shahana

## 2025-09-02 ENCOUNTER — LAB VISIT (OUTPATIENT)
Dept: LAB | Facility: HOSPITAL | Age: 40
End: 2025-09-02
Payer: COMMERCIAL

## 2025-09-02 ENCOUNTER — OFFICE VISIT (OUTPATIENT)
Dept: INTERNAL MEDICINE | Facility: CLINIC | Age: 40
End: 2025-09-02
Payer: COMMERCIAL

## 2025-09-02 VITALS
SYSTOLIC BLOOD PRESSURE: 112 MMHG | HEART RATE: 70 BPM | RESPIRATION RATE: 16 BRPM | DIASTOLIC BLOOD PRESSURE: 78 MMHG | WEIGHT: 219.13 LBS | HEIGHT: 66 IN | BODY MASS INDEX: 35.22 KG/M2

## 2025-09-02 DIAGNOSIS — R60.0 BILATERAL LEG EDEMA: ICD-10-CM

## 2025-09-02 DIAGNOSIS — R60.0 BILATERAL LEG EDEMA: Primary | ICD-10-CM

## 2025-09-02 LAB
ALBUMIN SERPL BCP-MCNC: 4.3 G/DL (ref 3.5–5.2)
ALP SERPL-CCNC: 74 UNIT/L (ref 40–150)
ALT SERPL W/O P-5'-P-CCNC: 16 UNIT/L (ref 0–55)
ANION GAP (OHS): 9 MMOL/L (ref 8–16)
AST SERPL-CCNC: 23 UNIT/L (ref 0–50)
BILIRUB SERPL-MCNC: 0.8 MG/DL (ref 0.1–1)
BUN SERPL-MCNC: 18 MG/DL (ref 6–20)
CALCIUM SERPL-MCNC: 9.3 MG/DL (ref 8.7–10.5)
CHLORIDE SERPL-SCNC: 108 MMOL/L (ref 95–110)
CO2 SERPL-SCNC: 26 MMOL/L (ref 23–29)
CREAT SERPL-MCNC: 0.8 MG/DL (ref 0.5–1.4)
GFR SERPLBLD CREATININE-BSD FMLA CKD-EPI: >60 ML/MIN/1.73/M2
GLUCOSE SERPL-MCNC: 95 MG/DL (ref 70–110)
NT-PROBNP SERPL-MCNC: 71 PG/ML
POTASSIUM SERPL-SCNC: 3.9 MMOL/L (ref 3.5–5.1)
PROT SERPL-MCNC: 7.9 GM/DL (ref 6–8.4)
SODIUM SERPL-SCNC: 143 MMOL/L (ref 136–145)

## 2025-09-02 PROCEDURE — 3008F BODY MASS INDEX DOCD: CPT | Mod: CPTII,S$GLB,, | Performed by: PHYSICIAN ASSISTANT

## 2025-09-02 PROCEDURE — 1159F MED LIST DOCD IN RCRD: CPT | Mod: CPTII,S$GLB,, | Performed by: PHYSICIAN ASSISTANT

## 2025-09-02 PROCEDURE — 36415 COLL VENOUS BLD VENIPUNCTURE: CPT

## 2025-09-02 PROCEDURE — 3078F DIAST BP <80 MM HG: CPT | Mod: CPTII,S$GLB,, | Performed by: PHYSICIAN ASSISTANT

## 2025-09-02 PROCEDURE — 83880 ASSAY OF NATRIURETIC PEPTIDE: CPT

## 2025-09-02 PROCEDURE — 99214 OFFICE O/P EST MOD 30 MIN: CPT | Mod: S$GLB,,, | Performed by: PHYSICIAN ASSISTANT

## 2025-09-02 PROCEDURE — 3074F SYST BP LT 130 MM HG: CPT | Mod: CPTII,S$GLB,, | Performed by: PHYSICIAN ASSISTANT

## 2025-09-02 PROCEDURE — 82310 ASSAY OF CALCIUM: CPT

## 2025-09-02 PROCEDURE — 99999 PR PBB SHADOW E&M-EST. PATIENT-LVL IV: CPT | Mod: PBBFAC,,, | Performed by: PHYSICIAN ASSISTANT

## 2025-09-02 PROCEDURE — G2211 COMPLEX E/M VISIT ADD ON: HCPCS | Mod: S$GLB,,, | Performed by: PHYSICIAN ASSISTANT

## 2025-09-03 ENCOUNTER — PATIENT MESSAGE (OUTPATIENT)
Dept: INTERNAL MEDICINE | Facility: CLINIC | Age: 40
End: 2025-09-03
Payer: COMMERCIAL

## 2025-09-05 ENCOUNTER — HOSPITAL ENCOUNTER (OUTPATIENT)
Dept: RADIOLOGY | Facility: HOSPITAL | Age: 40
Discharge: HOME | End: 2025-09-05
Attending: PHYSICIAN ASSISTANT
Payer: COMMERCIAL

## 2025-09-05 DIAGNOSIS — R60.0 BILATERAL LEG EDEMA: ICD-10-CM

## 2025-09-05 PROCEDURE — 93970 EXTREMITY STUDY: CPT | Mod: 26,,, | Performed by: RADIOLOGY

## 2025-09-05 PROCEDURE — 93970 EXTREMITY STUDY: CPT | Mod: TC

## (undated) DEVICE — ELECTRODE REM PLYHSV RETURN 9

## (undated) DEVICE — BLADE SURG CARBON STEEL SZ11

## (undated) DEVICE — TRAY MINOR GEN SURG OMC

## (undated) DEVICE — TAPE SILK 3IN

## (undated) DEVICE — SUT CHROMIC 3-0 SH 27IN GUT

## (undated) DEVICE — DRAPE LAP T SHT W/ INSTR PAD

## (undated) DEVICE — TRAY SKIN SCRUB WET PREMIUM

## (undated) DEVICE — LUBRICANT SURGILUBE 2 OZ

## (undated) DEVICE — BOWL STERILE LARGE 32OZ

## (undated) DEVICE — TIP YANKAUERS BULB NO VENT

## (undated) DEVICE — SYR IRRIGATION BULB STER 60ML

## (undated) DEVICE — PANTIES FEMININE NAPKIN LG/XLG